# Patient Record
Sex: MALE | Race: WHITE | NOT HISPANIC OR LATINO | Employment: FULL TIME | URBAN - NONMETROPOLITAN AREA
[De-identification: names, ages, dates, MRNs, and addresses within clinical notes are randomized per-mention and may not be internally consistent; named-entity substitution may affect disease eponyms.]

---

## 2017-01-10 ENCOUNTER — OFFICE VISIT (OUTPATIENT)
Dept: FAMILY MEDICINE | Facility: OTHER | Age: 47
End: 2017-01-10
Attending: FAMILY MEDICINE
Payer: COMMERCIAL

## 2017-01-10 VITALS
OXYGEN SATURATION: 98 % | BODY MASS INDEX: 40.43 KG/M2 | SYSTOLIC BLOOD PRESSURE: 138 MMHG | RESPIRATION RATE: 16 BRPM | WEIGHT: 315 LBS | HEART RATE: 80 BPM | HEIGHT: 74 IN | TEMPERATURE: 98 F | DIASTOLIC BLOOD PRESSURE: 86 MMHG

## 2017-01-10 DIAGNOSIS — E66.01 MORBID OBESITY DUE TO EXCESS CALORIES (H): Primary | ICD-10-CM

## 2017-01-10 LAB
ALBUMIN SERPL-MCNC: 3.8 G/DL (ref 3.4–5)
ALP SERPL-CCNC: 61 U/L (ref 40–150)
ALT SERPL W P-5'-P-CCNC: 83 U/L (ref 0–70)
ANION GAP SERPL CALCULATED.3IONS-SCNC: 9 MMOL/L (ref 3–14)
AST SERPL W P-5'-P-CCNC: 45 U/L (ref 0–45)
BASOPHILS # BLD AUTO: 0 10E9/L (ref 0–0.2)
BASOPHILS NFR BLD AUTO: 0.8 %
BILIRUB SERPL-MCNC: 0.5 MG/DL (ref 0.2–1.3)
BUN SERPL-MCNC: 14 MG/DL (ref 7–30)
CALCIUM SERPL-MCNC: 9.1 MG/DL (ref 8.5–10.1)
CHLORIDE SERPL-SCNC: 106 MMOL/L (ref 94–109)
CO2 SERPL-SCNC: 26 MMOL/L (ref 20–32)
CREAT SERPL-MCNC: 1.05 MG/DL (ref 0.66–1.25)
DIFFERENTIAL METHOD BLD: NORMAL
EOSINOPHIL # BLD AUTO: 0.4 10E9/L (ref 0–0.7)
EOSINOPHIL NFR BLD AUTO: 7.2 %
ERYTHROCYTE [DISTWIDTH] IN BLOOD BY AUTOMATED COUNT: 13.4 % (ref 10–15)
GFR SERPL CREATININE-BSD FRML MDRD: 76 ML/MIN/1.7M2
GLUCOSE SERPL-MCNC: 110 MG/DL (ref 70–99)
HCT VFR BLD AUTO: 42.6 % (ref 40–53)
HGB BLD-MCNC: 14.8 G/DL (ref 13.3–17.7)
LYMPHOCYTES # BLD AUTO: 1.2 10E9/L (ref 0.8–5.3)
LYMPHOCYTES NFR BLD AUTO: 22.4 %
MCH RBC QN AUTO: 29.2 PG (ref 26.5–33)
MCHC RBC AUTO-ENTMCNC: 34.7 G/DL (ref 31.5–36.5)
MCV RBC AUTO: 84 FL (ref 78–100)
MONOCYTES # BLD AUTO: 0.6 10E9/L (ref 0–1.3)
MONOCYTES NFR BLD AUTO: 11 %
NEUTROPHILS # BLD AUTO: 3.1 10E9/L (ref 1.6–8.3)
NEUTROPHILS NFR BLD AUTO: 58.6 %
PLATELET # BLD AUTO: 324 10E9/L (ref 150–450)
POTASSIUM SERPL-SCNC: 3.6 MMOL/L (ref 3.4–5.3)
PROT SERPL-MCNC: 7.5 G/DL (ref 6.8–8.8)
RBC # BLD AUTO: 5.07 10E12/L (ref 4.4–5.9)
SODIUM SERPL-SCNC: 141 MMOL/L (ref 133–144)
WBC # BLD AUTO: 5.3 10E9/L (ref 4–11)

## 2017-01-10 PROCEDURE — 99214 OFFICE O/P EST MOD 30 MIN: CPT | Performed by: FAMILY MEDICINE

## 2017-01-10 PROCEDURE — 82306 VITAMIN D 25 HYDROXY: CPT | Mod: 90 | Performed by: FAMILY MEDICINE

## 2017-01-10 PROCEDURE — 80053 COMPREHEN METABOLIC PANEL: CPT | Performed by: FAMILY MEDICINE

## 2017-01-10 PROCEDURE — 83970 ASSAY OF PARATHORMONE: CPT | Mod: 90 | Performed by: FAMILY MEDICINE

## 2017-01-10 PROCEDURE — 99000 SPECIMEN HANDLING OFFICE-LAB: CPT | Performed by: FAMILY MEDICINE

## 2017-01-10 PROCEDURE — 36415 COLL VENOUS BLD VENIPUNCTURE: CPT | Performed by: FAMILY MEDICINE

## 2017-01-10 PROCEDURE — 85025 COMPLETE CBC W/AUTO DIFF WBC: CPT | Performed by: FAMILY MEDICINE

## 2017-01-10 ASSESSMENT — PAIN SCALES - GENERAL: PAINLEVEL: NO PAIN (0)

## 2017-01-10 NOTE — PROGRESS NOTES
Jason Duncan    January 10, 2017    Chief Complaint   Patient presents with     Weight Problem     needs a letter of support for weight loss surgery       SUBJECTIVE:  Lengthy review today.  Going through the process of weight loss surgery.  Needs some labs and letter of support.  He had great luck with lap band, but it has since failed and he is planning another procedure.  workign through the details and preparatory work.  We reviewed.      Past Medical History   Diagnosis Date     Unspecified otitis media 12/29/2000     Unspecified essential hypertension 6/9/2008       Past Surgical History   Procedure Laterality Date     Lap band  2008     Nasal septoplasty       Septoplasty       Nasal     Gastric restrictive procedure, open, remove/replace subcutaneous port  2008     Gi surgery  2015     removal of lap band        Current Outpatient Prescriptions   Medication Sig Dispense Refill     lisinopril (PRINIVIL,ZESTRIL) 10 MG tablet TAKE 1 TABLET BY MOUTH DAILY 90 tablet 3     cyclobenzaprine (FLEXERIL) 10 MG tablet TAKE 1 TABLET BY MOUTH NIGHTLY AS NEEDED FOR MUSCLE SPASMS 90 tablet 0     CLEAR-ATADINE 10 MG tablet TAKE 1 TABLET BY MOUTH DAILY 30 tablet 5     cetirizine (ZYRTEC) 10 MG tablet TAKE 1 TABLET BY MOUTH EVERY EVENING 30 tablet 3     amLODIPine (NORVASC) 10 MG tablet Take 1 tablet (10 mg) by mouth daily 90 tablet 3     multivitamin, therapeutic with minerals (MULTI-VITAMIN) TABS Take 1 tablet by mouth daily       aspirin 81 MG tablet Take by mouth daily         No Known Allergies    Family History   Problem Relation Age of Onset     Dementia Mother 72     Cause of death     DIABETES Father      Respiratory Father      COPD     Cardiovascular Father      CHF       Social History     Social History     Marital Status:      Spouse Name: N/A     Number of Children: N/A     Years of Education: N/A     Occupational History     Not on file.     Social History Main Topics     Smoking status: Never Smoker       Smokeless tobacco: Never Used     Alcohol Use: Yes      Comment: rarely     Drug Use: No     Sexual Activity: Not on file     Other Topics Concern     Parent/Sibling W/ Cabg, Mi Or Angioplasty Before 65f 55m? No     Social History Narrative       5 point ROS negative except as noted above in HPI, including Gen., Resp., CV, GI &  system review.     OBJECTIVE:  B/P: 138/86, T: 98, P: 80, R: 16    GENERAL APPEARANCE: Alert, no acute distress  CV: regular rate and rhythm, no murmur, rub or gallop  RESP: lungs clear to auscultation bilaterally  ABDOMEN: normal bowel sounds, soft, nontender, no hepatosplenomegaly or other masses  SKIN: no suspicious lesions or rashes to visualized skin  NEURO: Alert, oriented x 3, speech and mentation normal    ASSESSMENT and PLAN:  (E66.01) Morbid obesity due to excess calories (H)  (primary encounter diagnosis)  Comment: ongoing  Plan: Parathyroid Hormone Intact, Vitamin D         Deficiency, CBC with platelets and         differential, Comprehensive metabolic panel         (BMP + Alb, Alk Phos, ALT, AST, Total. Bili,         TP), NUTRITION REFERRAL        Getting labs and nutrition referral as recommended per weight loss surgery team.  Letter dictated.  25 minutes spent between coordinating with patient and surgery team and chart review.  I did all the steps that they asked me to.

## 2017-01-11 ENCOUNTER — CARE COORDINATION (OUTPATIENT)
Dept: SURGERY | Facility: CLINIC | Age: 47
End: 2017-01-11

## 2017-01-11 NOTE — PROGRESS NOTES
Tasklist reviewed.    PREOPERATIVE WEIGHT LOSS SURGERY TASKLIST  Call Kirstin at [phone] for any questions regarding tasks on this list  Tentative surgery: Sleeve (previous band and band removal)  Approximate Month and Year: To be determined     Surgeon: Cher Galvez Insurance Coverage: Preferred One  Exclusions: None  __x__Seminar viewed.    __x__Research Consent Form signed.     __x__Registered on [employer].     __x__Received Decision Making Handout to read.    __x__Received information about Support Group, 1st Wednesday of the month at  the [facility], 6:30p - 8:00pm.  __x__Letter of support from primary care provider.  1/10/17 from Dr Israel Meredith.    __x__Labs to be drawn by primary care provider:  1/10/17 done, in Epic.           Complete Blood Count (CBC), Comprehensive metabolic panel (CMP), Parathormone (PTH) and Vitamin D level.    ____Lose 20 lbs prior to surgery from the initial consult weight of 341 lbs    ____Exercise goal: 20 mins., 5 days a week; increase as tolerated.    _x__Dietician visit at initial consult - 10/30/15, 12/9/15, 2/3/16 Anabelle He RD           _x___Dietician visit month 2 - 3/2/16 Anabelle He RD  _x___Dietician visit month 3 - 4/8/16 Anabelle He RD  _x___Dietician visit month 4  - 5/25/16  Anabelle He RD                      _x___Dietician visit month 5 - 9/7/16 with Kirstie  ____Dietician visit month 6 - TBD - 11/10/16 Instructed to continue to see Anabelle He RD until at goal weight.  _x___Psychologist evaluation - 12/10/15 with Dr Rony Rodriguez. 11/10/16 Instructed to get an updates.              The week before you meet with the surgeon, watch the following online classes and call ANGELO Fulton at [phone] to inform her that you watched  them and to get any questions answered:  _____Before Your Weight Loss Surgery Online Class at [email or web address]  _____After Your Weight Loss Surgery Online Class at  [email or web address]  _____See your surgeon  after all the above tasks are done and you are at your goal weight or within 5 lbs of your goal weight.  Call Kirstin at  [phone] to schedule the appointment and to inform her of when you have watched the online classes.  _____After your visit with the surgeon, call Meño at [phone] to finalize the surgery date and schedule your final appointments to be 3 weeks before  your surgery date.     FINAL APPOINTMENTS  _____Weigh-in/Nurse Visit at Clinic 1E, 3 weeks before surgery.                Plan to be at your goal weight for this visit.  This can be done the same day you meet the surgeon if you are at your goal weight.  _____Pre-assessment Clinic, to be done 3 weeks before surgery. Meet with the anesthesia team. Your pre-operative history and physical can be done at this visit.    DISCLAIMER: Based on the evaluation results, the bariatric team decides whether and which bariatric surgery is the best option for you. Sometimes, even if you meet all of the pre-operative criteria, the bariatric team may still determine that in their medical judgement surgery is not recommended because of the risks to you.    CONTACT INFORMATION  ______If questions prior to surgery, call ANGELO Fulton at [phone].    ______If questions about insurance or scheduling surgery, call Meño at [phone].  ______If questions after surgery and to schedule 1E clinic appts, please call our  Call Center at [phone].  ______Fax: [phone] (preoperative documents, FMLA or return to work  forms).

## 2017-01-11 NOTE — PROGRESS NOTES
January 10, 2017            Radha Quigley RN   FAX:  [phone]      RE: Leatha Ospina   Date of Birth:  [birth year]   Medical Record #:  [id number]      Dear Radha:      I am happy to write a letter of support in regards to my patient, Leatha Ospina.  He had great luck in the past with treating his morbid obesity with a lap band, which has since failed and as I am sure you know, he is planning another weight loss procedure.  He has gained a substantial amount of weight back and seems to need the assistance of a surgical procedure in order to help him.  He certainly is motivated and has no barriers that I can identify to another procedure to aid in his weight loss.       I have ordered all the appropriate labs that were suggested in the staff message sent to me by the [facility] at the [facility].  I am writing this letter in full support of Leatha pursuing this course of action.      Please contact me with any questions.         Sincerely,      JOCELYN MONK MD             D: 01/10/2017 12:50   T: 01/10/2017 13:47   MT: noah      Name:     LEATHA OSPINA   MRN:      [id number]        Account:      [id number]   :      1970      Document: [id number]

## 2017-01-12 ENCOUNTER — HOSPITAL ENCOUNTER (OUTPATIENT)
Dept: NUTRITION | Facility: HOSPITAL | Age: 47
Discharge: HOME | End: 2017-01-12
Attending: FAMILY MEDICINE | Admitting: FAMILY MEDICINE
Payer: COMMERCIAL

## 2017-01-12 DIAGNOSIS — E66.01 MORBID OBESITY DUE TO EXCESS CALORIES (H): Primary | ICD-10-CM

## 2017-01-12 LAB
DEPRECATED CALCIDIOL+CALCIFEROL SERPL-MC: 20 UG/L (ref 20–75)
PTH-INTACT SERPL-MCNC: 31 PG/ML (ref 12–72)

## 2017-01-12 PROCEDURE — 97802 MEDICAL NUTRITION INDIV IN: CPT | Performed by: DIETITIAN, REGISTERED

## 2017-01-12 NOTE — PROGRESS NOTES
"[facility]  Medical Nutrition Therapy    Visit Type: Initial Assessment    Jason PLATT Arceniocayden referred by Dr. Meredith for MNT related to Bariatric and Obesity    Patient accompanied by self.    Nutrition Assessment:  Anthropometrics  Height: 6'3\" BMI:  43.1     Weight: 345 lbs    Goal weight : 321 lbs   IBW: 176-216 lbs    Nutrition History   Going through the process of weight loss surgery. Did get the lap band in the past, but this has since failed. He is interested in laparoscopic sleeve gastrectomy.  Pt has completed all required RD visits at [place].      Required weight loss goal pre-op : -24 lbs from initial consult weight (goal weight 321 lbs or less before surgery)    Physical Activity   Active at work on some days, depending on the job.      Nutrition Prescription (using 98 kg)  Energy:   9636-6786 kcals/day  (25-30 kcals/kg)      Protein:   78-98 grams/day   (0.8-1 grams/kg)                                                                             Food Record  Hx of diet attempt of high protein and low carb.  Currently he states \"I got really discouraged and over the holidays almost gave up, but recently and re-inspired to start this process again\"    Breakfast is usually eggs, sausage, toast, english muffins  Lunch is sandwich/burger (fast food)  Snacks are usually pretzels, banana, jello  Typically doesn't eat supper  Is going to stop drinking pop   Some alcohol, 3x/month        Nutrition Diagnosis:   Morbid obesity due to long history of excess kcals as evidenced by BMI of 43.1    Nutrition Intervention:   Provided education on post-op guidelines, vitamins/mineral supplementation, portion/kcal control, mindful eating, exercise.    Will come into see RD here bi-weekly for weight checks and followup.      Nutrition Goals:   Will lose 1 lb/week  Will record food intake into phone ameena and paper form provided  Will work on cutting out 300 kcals/day, with goal of eating 2100 " kcals/day    Nutrition Follow Up / Monitoring:  Patient to follow-up with RD in 2 weeks (January 25th 9:15)  Patient has RD contact information to call/email if needed.    Estefania Martin RD LD

## 2017-01-16 ENCOUNTER — TELEPHONE (OUTPATIENT)
Dept: FAMILY MEDICINE | Facility: OTHER | Age: 47
End: 2017-01-16

## 2017-01-16 ENCOUNTER — CARE COORDINATION (OUTPATIENT)
Dept: SURGERY | Facility: CLINIC | Age: 47
End: 2017-01-16

## 2017-01-16 NOTE — PROGRESS NOTES
Tasklist reviewed.     PREOPERATIVE WEIGHT LOSS SURGERY TASKLIST  Call Kirstin at [phone] for any questions regarding tasks on this list  Tentative surgery: Sleeve (previous band and band removal)  Approximate Month and Year: To be determined     Surgeon: Cher Galvez Insurance Coverage: Preferred One  Exclusions: None  __x__Seminar viewed.    __x__Research Consent Form signed.     __x__Registered on [employer].     __x__Received Decision Making Handout to read.    __x__Received information about Support Group, 1st Wednesday of the month at  the [facility], 6:30p - 8:00pm.  __x__Letter of support from primary care provider.  1/10/17 from Dr Israel Meredith.    __x__Labs to be drawn by primary care provider:  1/10/17 done, in Epic.           Complete Blood Count (CBC), Comprehensive metabolic panel (CMP), Parathormone (PTH) and Vitamin D level.    ____Lose 20 lbs prior to surgery from the initial consult weight of 341 lbs    ____Exercise goal: 20 mins., 5 days a week; increase as tolerated.    _x__Dietician visit at initial consult - 10/30/15, 12/9/15, 2/3/16 Anabelle He RD           _x__Dietician visit month 2 - 3/2/16 Anabelle He RD  _x__Dietician visit month 3 - 4/8/16 Anabelle He RD  _x__Dietician visit month 4  - 5/25/16  Anabelle He RD                      _x__Dietician visit month 5 - 9/7/16 with Kirstie  _x__Dietician visit month 6 - 1/12/17 Estefania Martin RD  Instructed to continue to see RD until at goal weight.  _x__Psychologist evaluation - 12/10/15 with Dr Rony Rodriguez. 11/10/16 Instructed to get an updates.              The week before you meet with the surgeon, watch the following online classes and call ANGELO Fulton at [phone] to inform her that you watched  them and to get any questions answered:  ____Before Your Weight Loss Surgery Online Class at [email or web address]  ____After Your Weight Loss Surgery Online Class at  [email or web address]  ____See your surgeon after all  the above tasks are done and you are at your goal weight or within 5 lbs of your goal weight.  Call Kirstin at  [phone] to schedule the appointment and to inform her of when you have watched the online classes.  ____After your visit with the surgeon, call Meño at [phone] to finalize the surgery date and schedule your final appointments to be 3 weeks before  your surgery date.     FINAL APPOINTMENTS  ____Weigh-in/Nurse Visit at Clinic 1E, 3 weeks before surgery.                Plan to be at your goal weight for this visit.  This can be done the same day you meet the surgeon if you are at your goal weight.  ____Pre-assessment Clinic, to be done 3 weeks before surgery. Meet with the anesthesia team. Your pre-operative history and physical can be done at this visit.    DISCLAIMER: Based on the evaluation results, the bariatric team decides whether and which bariatric surgery is the best option for you. Sometimes, even if you meet all of the pre-operative criteria, the bariatric team may still determine that in their medical judgement surgery is not recommended because of the risks to you.    CONTACT INFORMATION  _____If questions prior to surgery, call ANGELO Fulton at [phone].    _____If questions about insurance or scheduling surgery, call Meño at [phone].  _____If questions after surgery and to schedule 1E clinic appts, please call our  Call Center at [phone].  _____Fax: [phone] (preoperative documents, FMLA or return to work  forms).

## 2017-01-16 NOTE — TELEPHONE ENCOUNTER
Pt looking at my chart and Dr wants him to start vitam D 5000 3 daily and he was wondering what that meant and is this prescription or over the counter medication

## 2017-01-25 ENCOUNTER — TELEPHONE (OUTPATIENT)
Dept: SURGERY | Facility: CLINIC | Age: 47
End: 2017-01-25

## 2017-01-25 ENCOUNTER — HOSPITAL ENCOUNTER (OUTPATIENT)
Dept: NUTRITION | Facility: HOSPITAL | Age: 47
Setting detail: THERAPIES SERIES
End: 2017-01-25
Attending: FAMILY MEDICINE
Payer: COMMERCIAL

## 2017-01-25 PROCEDURE — 97803 MED NUTRITION INDIV SUBSEQ: CPT | Performed by: DIETITIAN, REGISTERED

## 2017-01-25 NOTE — TELEPHONE ENCOUNTER
Tasklist reviewed with patient.  Instructed to call Dr Rony Rodrgiuez at [phone] to get psychological evaluation updated.  To call coordinator to set up visit with surgeon when psychological evaluation is done and within 5 lbs of goal weight.  Seeing dietician 2x/month in [place].     PREOPERATIVE WEIGHT LOSS SURGERY TASKLIST  Call Kirstin at [phone] for any questions regarding tasks on this list  Tentative surgery: Sleeve (previous band and band removal)  Approximate Month and Year: To be determined     Surgeon: Cher  Tentative Insurance Coverage: Preferred One  Exclusions: None  __x__Seminar viewed.    __x__Research Consent Form signed.     __x__Registered on [employer].     __x__Received Decision Making Handout to read.    __x__Received information about Support Group, 1st Wednesday of the month at  the [facility], 6:30p - 8:00pm.  __x__Letter of support from primary care provider.  1/10/17 from Dr Israel Meredith.    __x__Labs to be drawn by primary care provider:  1/10/17 done, in Epic.           Complete Blood Count (CBC), Comprehensive metabolic panel (CMP), Parathormone (PTH) and Vitamin D level.    ____Lose 20 lbs prior to surgery from the initial consult weight of 341 lbs    ____Exercise goal: 20 mins., 5 days a week; increase as tolerated.    _x__Dietician visit at initial consult - 10/30/15, 12/9/15, 2/3/16 Anabelle He RD           _x__Dietician visit month 2 - 3/2/16 Anabelle He RD  _x__Dietician visit month 3 - 4/8/16 Anabelle He RD  _x__Dietician visit month 4  - 5/25/16  Anabelle He RD                      _x__Dietician visit month 5 - 9/7/16 with Kirstie  _x__Dietician visit month 6 - 1/12/17 Estefania Martin RD  Instructed to continue to see RD until at goal weight.  ___Psychologist evaluation - 12/10/15 with Dr Rony Rodriguez. 11/10/16 Instructed to get an updates.              The week before you meet with the surgeon, watch the following online classes and call ANGELO Fulton at  [phone] to inform her that you watched  them and to get any questions answered:  ____Before Your Weight Loss Surgery Online Class at [email or web address]  ____After Your Weight Loss Surgery Online Class at  [email or web address]  ____See your surgeon after all the above tasks are done and you are at your goal weight or within 5 lbs of your goal weight.  Call Kirstin at  [phone] to schedule the appointment and to inform her of when you have watched the online classes.  ____After your visit with the surgeon, call Meño at [phone] to finalize the surgery date and schedule your final appointments to be 3 weeks before  your surgery date.     FINAL APPOINTMENTS  ____Weigh-in/Nurse Visit at Clinic 1E, 3 weeks before surgery.                Plan to be at your goal weight for this visit.  This can be done the same day you meet the surgeon if you are at your goal weight.  ____Pre-assessment Clinic, to be done 3 weeks before surgery. Meet with the anesthesia team. Your pre-operative history and physical can be done at this visit.    DISCLAIMER: Based on the evaluation results, the bariatric team decides whether and which bariatric surgery is the best option for you. Sometimes, even if you meet all of the pre-operative criteria, the bariatric team may still determine that in their medical judgement surgery is not recommended because of the risks to you.    CONTACT INFORMATION  _____If questions prior to surgery, call ANGELO Fulton at [phone].    _____If questions about insurance or scheduling surgery, call Meño at [phone].  _____If questions after surgery and to schedule 1E clinic appts, please call our  Call Center at [phone].  _____Fax: [phone] (preoperative documents, FMLA or return to work  forms).

## 2017-01-25 NOTE — PROGRESS NOTES
"Follow-up from 1/12/17    Height: 6'3\"  Weight: 335 lbs  BMI : 41.9  Goal weight: 321 lbs    Lost 10 lbs in two weeks.    Kept food log on paper.  States eating healthy has been easy so far, and has not struggled with the reduced oral intake.  Is making sure he gets protein, fruits, vegetables.  Sometimes treats himself to m&ms, stating \"they are my downfall\" but does portion these out and factors them into his calorie limit for the day.    Has been exercising daily, which he states is mostly walking.    Will continue to meet bi-weekly until he is at goal weight of 321 lbs.    Will continue to keep food log.      Next scheduled appt:  Feb 8th at 8:15am    Time spent with patient: 15 minutes    Estefania Martin RD  "

## 2017-01-31 ENCOUNTER — OFFICE VISIT (OUTPATIENT)
Dept: FAMILY MEDICINE | Facility: OTHER | Age: 47
End: 2017-01-31
Attending: PHYSICIAN ASSISTANT
Payer: COMMERCIAL

## 2017-01-31 VITALS
HEART RATE: 72 BPM | SYSTOLIC BLOOD PRESSURE: 116 MMHG | DIASTOLIC BLOOD PRESSURE: 70 MMHG | BODY MASS INDEX: 40.17 KG/M2 | WEIGHT: 313 LBS | TEMPERATURE: 97.9 F | HEIGHT: 74 IN

## 2017-01-31 DIAGNOSIS — L40.9 PSORIASIS: Primary | ICD-10-CM

## 2017-01-31 PROCEDURE — 99213 OFFICE O/P EST LOW 20 MIN: CPT | Performed by: PHYSICIAN ASSISTANT

## 2017-01-31 RX ORDER — TRIAMCINOLONE ACETONIDE 1 MG/G
CREAM TOPICAL
Qty: 80 G | Refills: 0 | Status: SHIPPED | OUTPATIENT
Start: 2017-01-31 | End: 2017-04-12

## 2017-01-31 RX ORDER — BETAMETHASONE DIPROPIONATE 0.5 MG/G
OINTMENT, AUGMENTED TOPICAL
Qty: 45 G | Refills: 0 | Status: SHIPPED | OUTPATIENT
Start: 2017-01-31 | End: 2017-04-12

## 2017-01-31 ASSESSMENT — PAIN SCALES - GENERAL: PAINLEVEL: NO PAIN (0)

## 2017-01-31 NOTE — NURSING NOTE
"Chief Complaint   Patient presents with     Derm Problem     Rash on upper back x 1 month, itchy       Initial /70 mmHg  Pulse 72  Temp(Src) 97.9  F (36.6  C) (Tympanic)  Ht 6' 2\" (1.88 m)  Wt 313 lb (141.976 kg)  BMI 40.17 kg/m2 Estimated body mass index is 40.17 kg/(m^2) as calculated from the following:    Height as of this encounter: 6' 2\" (1.88 m).    Weight as of this encounter: 313 lb (141.976 kg).  BP completed using cuff size: pasha Coffey    "

## 2017-01-31 NOTE — MR AVS SNAPSHOT
After Visit Summary   1/31/2017    Jason Duncan    MRN: [id number]           Patient Information     Date Of Birth          1970        Visit Information        Provider Department      1/31/2017 4:00 PM Nina Bella PA [facility]        Today's Diagnoses     Psoriasis    -  1       Care Instructions    Follow up if symptoms persist or worsen.          Follow-ups after your visit        Additional Services     DERMATOLOGY REFERRAL       Your provider has referred you to: Dr. Jama    Please be aware that coverage of these services is subject to the terms and limitations of your health insurance plan.  Call member services at your health plan with any benefit or coverage questions.      Please bring the following with you to your appointment:    (1) Any X-Rays, CTs or MRIs which have been performed.  Contact the facility where they were done to arrange for  prior to your scheduled appointment.    (2) List of current medications  (3) This referral request   (4) Any documents/labs given to you for this referral                  Your next 10 appointments already scheduled     Feb 08, 2017  8:15 AM   Return Visit with Estefania Martin RD   [facility] ([facility] )    [address] [place] [address] [phone]              Who to contact     If you have questions or need follow up information about today's clinic visit or your schedule please contact [facility] directly at [phone].  Normal or non-critical lab and imaging results will be communicated to you by MyChart, letter or phone within 4 business days after the clinic has received the results. If you do not hear from us within 7 days, please contact the clinic through MyChart or phone. If you have a critical or abnormal lab result, we will notify you by phone as soon as possible.  Submit refill requests through [employer] or call your pharmacy and they will forward the  "refill request to us. Please allow 3 business days for your refill to be completed.          Additional Information About Your Visit        [employer] Information     [email or web address] gives you secure access to your electronic health record. If you see a primary care provider, you can also send messages to your care team and make appointments. If you have questions, please call your primary care clinic.  If you do not have a primary care provider, please call [phone] and they will assist you.        Care EveryWhere ID     This is your Care EveryWhere ID. This could be used by other organizations to access your [place] medical records  [id number]        Your Vitals Were     Pulse Temperature Height BMI (Body Mass Index)          72 97.9  F (36.6  C) (Tympanic) 6' 2\" (1.88 m) 40.17 kg/m2         Blood Pressure from Last 3 Encounters:   01/31/17 116/70   01/10/17 138/86   12/21/16 138/88    Weight from Last 3 Encounters:   01/31/17 313 lb (141.976 kg)   01/10/17 335 lb (151.955 kg)   12/21/16 340 lb (154.223 kg)              We Performed the Following     DERMATOLOGY REFERRAL          Today's Medication Changes          These changes are accurate as of: 1/31/17  4:23 PM.  If you have any questions, ask your nurse or doctor.               Start taking these medicines.        Dose/Directions    augmented betamethasone dipropionate 0.05 % ointment   Commonly known as:  DIPROLENE-AF   Used for:  Psoriasis   Started by:  Nina Bella PA        Apply sparingly to affected area twice daily as needed.  Do not apply to face.   Quantity:  45 g   Refills:  0       triamcinolone 0.1 % cream   Commonly known as:  KENALOG   Used for:  Psoriasis   Started by:  Nina Bella PA        Apply sparingly to affected area three times daily as needed   Quantity:  80 g   Refills:  0            Where to get your medicines      These medications were sent to [facility] - [address]  [address], " [place] [address]     Phone:  [phone]    - augmented betamethasone dipropionate 0.05 % ointment  - triamcinolone 0.1 % cream             Primary Care Provider Office Phone # Fax #    Israel Meredith [phone]       [facility] [address] [place] [address]        Thank you!     Thank you for choosing [facility]  for your care. Our goal is always to provide you with excellent care. Hearing back from our patients is one way we can continue to improve our services. Please take a few minutes to complete the written survey that you may receive in the mail after your visit with us. Thank you!             Your Updated Medication List - Protect others around you: Learn how to safely use, store and throw away your medicines at www.disposemymeds.org.          This list is accurate as of: 1/31/17  4:23 PM.  Always use your most recent med list.                   Brand Name Dispense Instructions for use    amLODIPine 10 MG tablet    NORVASC    90 tablet    Take 1 tablet (10 mg) by mouth daily       aspirin 81 MG tablet      Take by mouth daily       augmented betamethasone dipropionate 0.05 % ointment    DIPROLENE-AF    45 g    Apply sparingly to affected area twice daily as needed.  Do not apply to face.       cetirizine 10 MG tablet    zyrTEC    30 tablet    TAKE 1 TABLET BY MOUTH EVERY EVENING       CLEAR-ATADINE 10 MG tablet   Generic drug:  loratadine     30 tablet    TAKE 1 TABLET BY MOUTH DAILY       cyclobenzaprine 10 MG tablet    FLEXERIL    90 tablet    TAKE 1 TABLET BY MOUTH NIGHTLY AS NEEDED FOR MUSCLE SPASMS       lisinopril 10 MG tablet    PRINIVIL/ZESTRIL    90 tablet    TAKE 1 TABLET BY MOUTH DAILY       Multi-vitamin Tabs tablet      Take 1 tablet by mouth daily       triamcinolone 0.1 % cream    KENALOG    80 g    Apply sparingly to affected area three times daily as needed

## 2017-01-31 NOTE — PROGRESS NOTES
Subjective:  Jason Duncan is a 46 year old male who presents with a several year history of itchy rash on forearms and now larger itchy rash on back for 2 months.        PMH, PSH, FH reviewed and unchanged    Current Outpatient Prescriptions   Medication     lisinopril (PRINIVIL,ZESTRIL) 10 MG tablet     cyclobenzaprine (FLEXERIL) 10 MG tablet     CLEAR-ATADINE 10 MG tablet     cetirizine (ZYRTEC) 10 MG tablet     amLODIPine (NORVASC) 10 MG tablet     multivitamin, therapeutic with minerals (MULTI-VITAMIN) TABS     aspirin 81 MG tablet     No current facility-administered medications for this visit.       No Known Allergies    Review of Systems:  Gen: negative for fever, chills, change in weight  Derm: See HPI       Objective:    B/P: 116/70, T: 97.9, P: 72, R: Data Unavailable    Physical Exam:  Constitutional: healthy, alert and no distress  Skin: Inflammatory papules with some scale forearms. Scaly raised plaque patches on back.  Psych: Mood is euthymic with corresponding affect      Assessment and Plan  (L40.9) Psoriasis  (primary encounter diagnosis)  Comment: Ungt sparingly for 1 week, then off 2-3 weeks. Triamcinolone for 2 weeks in between, then off. Refer to Dr. Jama  Plan: DERMATOLOGY REFERRAL, augmented betamethasone         dipropionate (DIPROLENE-AF) 0.05 % ointment,         triamcinolone (KENALOG) 0.1 % cream                Rx per EPIC.  Risk/benefit/side effects and intended purposes discussed.   Follow up with worsening sx or failure to improve or with any questions or concerns.     Nina Bella PA-C

## 2017-02-07 ENCOUNTER — OFFICE VISIT (OUTPATIENT)
Dept: FAMILY MEDICINE | Facility: OTHER | Age: 47
End: 2017-02-07
Attending: NURSE PRACTITIONER
Payer: OTHER MISCELLANEOUS

## 2017-02-07 VITALS
TEMPERATURE: 98.2 F | WEIGHT: 315 LBS | RESPIRATION RATE: 14 BRPM | SYSTOLIC BLOOD PRESSURE: 128 MMHG | DIASTOLIC BLOOD PRESSURE: 72 MMHG | HEART RATE: 88 BPM | HEIGHT: 74 IN | BODY MASS INDEX: 40.43 KG/M2

## 2017-02-07 DIAGNOSIS — M25.461 KNEE EFFUSION, RIGHT: ICD-10-CM

## 2017-02-07 DIAGNOSIS — S89.91XA KNEE INJURY, RIGHT, INITIAL ENCOUNTER: Primary | ICD-10-CM

## 2017-02-07 DIAGNOSIS — M25.561 ACUTE PAIN OF RIGHT KNEE: ICD-10-CM

## 2017-02-07 PROCEDURE — 99214 OFFICE O/P EST MOD 30 MIN: CPT | Performed by: NURSE PRACTITIONER

## 2017-02-07 PROCEDURE — 73562 X-RAY EXAM OF KNEE 3: CPT | Mod: TC | Performed by: RADIOLOGY

## 2017-02-07 ASSESSMENT — PAIN SCALES - GENERAL: PAINLEVEL: SEVERE PAIN (6)

## 2017-02-07 NOTE — PATIENT INSTRUCTIONS
1. Knee injury, right, initial encounter  - XR KNEE RIGHT 3 VIEWS (Clinic Performed); Future  - XR KNEE RIGHT 3 VIEWS (Clinic Performed)    2. Acute pain of right knee  - order for DME; Equipment being ordered: knee sleeve  Dispense: 1 Device; Refill: 0  - MR Knee Right w/o & w Contrast; Future  - MR Knee Right w/o & w Contrast  - You will hear from the hospital once MRI is improved  - Ice  - Rest  - Elevate as able    3. Knee effusion, right  - order for DME; Equipment being ordered: knee sleeve  Dispense: 1 Device; Refill: 0  - MR Knee Right w/o & w Contrast; Future  - MR Knee Right w/o & w Contrast    FU - after MRI, sooner as needed  If you do not hear back regarding the MRI within 1 week, please contact my nurse Lorrie at [phone]        Samira [facility]OR[facility] Facility[facility]  [phone]

## 2017-02-07 NOTE — Clinical Note
[facility] (formerly [facility])  [address]  [facility] [address]  [phone]    REPORT OF WORK ABILITY    NOTE TO EMPLOYEE: You must promptly provide a copy of this report to your  employer or worker's compensation insurer, and Qualified Rehabilitation Consultant.    Date: 2/7/2017                     Employee Name: Jason Duncan         YOB: 1970  Medical Record Number: [id number]   Soc.Sec.No: [id number]  Employer: [employer]                 Date of Injury: 2/6/17  Managed Care Organization / Insurance Company Name: UNKNOWN    Diagnosis: Right knee trauma due to fall, medial effusion, lateral pain, consistent with twisting injury.  Work Related: yes    Permanent Partial Disability(PPD) likely: NO    EMPLOYEE IS ABLE TO WORK: with restrictions from 2/7/17 to undetermined, as MRI requires approval     RESTRICTIONS IF ANY:   Stand: Continuously (6-8 hours)   Sit: Full time    Walk: Occasionally (2-4 hours)   Kneel/[place]: Not at all (0 hours)      Lift, carry no more than:  No restrictions Occasionally (2-4 hours)   May use hands repetitively for:    Simple grasping: not applicable      Pushing/pulling: not applicable    Fine manipulation:  not applicable    Firm grasping:  not applicable     Operate Foot controls:  Right foot: no   Left foot: no     Bend:  N/A     Stoop: N/A    Twist: N/A     Reach Above Shoulders: Frequently (4-6 hours)    OTHER RESTRICTIONS: Cannot work on unlevel ground surface    TREATMENT PLAN/NOTES: change work position for comfort, MRI - as ordered, Ibuprofen and knee brace.      Samira ALCALA  [phone]

## 2017-02-07 NOTE — PROGRESS NOTES
"SUBJECTIVE:  Jason Duncan is a 46 year old male   Chief Complaint   Patient presents with     Fall     fell off snowbank yesterday injuring right knee      *_* Health Care Directive *_*       Active diagnoses this visit:  Knee injury, right, initial encounter -     Yesterday while at approximately 11:45am, while at work for [employer] where he works as a , patient fell, injuring his knee.  He was on a snowback, slid on ice, he fell and twisted his right knee.   He immediately experienced pain.    He reported the incident to his employer.  He went to work today despite discomfort, and is here for evaluation.    At this time his pain is lateral.    Increases with lateral movement.    No instability present  Knee is swollen, very tender - anterior and lateral        REVIEW OF SYSTEMS  Skin: negative  Eyes: negative  Ears/Nose/Throat: negative  Respiratory: No shortness of breath, dyspnea on exertion, cough, or hemoptysis  Cardiovascular: negative  Gastrointestinal: negative  Genitourinary: negative  Musculoskeletal: as above  Neurologic: negative  Psychiatric: negative  Hematologic/Lymphatic/Immunologic: negative      OBJECTIVE:  /72 mmHg  Pulse 88  Temp(Src) 98.2  F (36.8  C) (Tympanic)  Resp 14  Ht 6' 2\" (1.88 m)  Wt 342 lb 3.2 oz (155.221 kg)  BMI 43.92 kg/m2  Constitutional: healthy, alert, no distress and cooperative  ENT: ENT exam unremarkable  Cardiovascular: PMI normal. No murmurs, clicks gallops or rub  Respiratory: negative, Percussion normal. Good diaphragmatic excursion. Lungs clear  Gastrointestinal: Abdomen soft, non-tender. BS normal. No masses, organomegaly  Musculoskeletal: Right knee - large anterior to medial effusion, with lateral discomfort with internal or external rotation of foot.   Skin: no suspicious lesions or rashes  Neurologic: Gait normal. Sensation grossly WNL.  Psychiatric: mentation appears normal and affect " normal/bright  Hematologic/Lymphatic/Immunologic: No adenopathy noted    Visit time:  25 Minutes  Time spent with patient, including examination, face to face patient education - 20mn  Visit Content: During our face to face time, patient education was provided regarding diagnosis, and treatment pan. Patient counseled regarding disease process  All diagnosis and treatment plan as above are reviewed with the patient.  Record review completed      Xray completed noting no fracture, but joint effusion is noted consistent with his injury and pain complaints.      1. Knee injury, right, initial encounter  - XR KNEE RIGHT 3 VIEWS (Clinic Performed); Future  - XR KNEE RIGHT 3 VIEWS (Clinic Performed)    2. Acute pain of right knee  - order for DME; Equipment being ordered: knee sleeve  Dispense: 1 Device; Refill: 0  - MR Knee Right w/o & w Contrast; Future  - MR Knee Right w/o & w Contrast  - You will hear from the hospital once MRI is improved  - Ice  - Rest  - Elevate as able    3. Knee effusion, right  - order for DME; Equipment being ordered: knee sleeve  Dispense: 1 Device; Refill: 0  - MR Knee Right w/o & w Contrast; Future  - MR Knee Right w/o & w Contrast    FU - after MRI, sooner as needed  If you do not hear back regarding the MRI within 1 week, please contact my nurse Lorrie at [phone]    Samira ALCALA  [phone]

## 2017-02-07 NOTE — NURSING NOTE
"Chief Complaint   Patient presents with     Fall     fell off snowbank yesterday injuring right knee      *_* Health Care Directive *_*       Initial /72 mmHg  Pulse 88  Temp(Src) 98.2  F (36.8  C) (Tympanic)  Resp 14  Ht 6' 2\" (1.88 m)  Wt 342 lb 3.2 oz (155.221 kg)  BMI 43.92 kg/m2 Estimated body mass index is 43.92 kg/(m^2) as calculated from the following:    Height as of this encounter: 6' 2\" (1.88 m).    Weight as of this encounter: 342 lb 3.2 oz (155.221 kg).  Medication Reconciliation: complete       ASHLEY ALTMAN  .    "

## 2017-02-07 NOTE — MR AVS SNAPSHOT
After Visit Summary   2/7/2017    Jason Duncan    MRN: [id number]           Patient Information     Date Of Birth          1970        Visit Information        Provider Department      2/7/2017 1:45 PM Samira Whitney NP [facility]        Today's Diagnoses     Knee injury, right, initial encounter    -  1     Acute pain of right knee         Knee effusion, right           Care Instructions          1. Knee injury, right, initial encounter  - XR KNEE RIGHT 3 VIEWS (Clinic Performed); Future  - XR KNEE RIGHT 3 VIEWS (Clinic Performed)    2. Acute pain of right knee  - order for DME; Equipment being ordered: knee sleeve  Dispense: 1 Device; Refill: 0  - MR Knee Right w/o & w Contrast; Future  - MR Knee Right w/o & w Contrast  - You will hear from the hospital once MRI is improved  - Ice  - Rest  - Elevate as able    3. Knee effusion, right  - order for DME; Equipment being ordered: knee sleeve  Dispense: 1 Device; Refill: 0  - MR Knee Right w/o & w Contrast; Future  - MR Knee Right w/o & w Contrast    FU - after MRI, sooner as needed  If you do not hear back regarding the MRI within 1 week, please contact my nurse Lorrie at [phone]        Samira Whitney [facility]  [phone]                Follow-ups after your visit        Your next 10 appointments already scheduled     Feb 08, 2017  8:15 AM   Return Visit with Estefania Martin RD   [facility] ([facility] )    [address] [place] [id number] [phone]              Who to contact     If you have questions or need follow up information about today's clinic visit or your schedule please contact [facility] directly at [phone].  Normal or non-critical lab and imaging results will be communicated to you by MyChart, letter or phone within 4 business days after the clinic has received the results. If you do not hear from us within 7 days, please contact the clinic through MyChart or phone. If you have a  "critical or abnormal lab result, we will notify you by phone as soon as possible.  Submit refill requests through [employer] or call your pharmacy and they will forward the refill request to us. Please allow 3 business days for your refill to be completed.          Additional Information About Your Visit        [employer] Information     [employer] gives you secure access to your electronic health record. If you see a primary care provider, you can also send messages to your care team and make appointments. If you have questions, please call your primary care clinic.  If you do not have a primary care provider, please call [phone] and they will assist you.        Care EveryWhere ID     This is your Care EveryWhere ID. This could be used by other organizations to access your [place] medical records  [id number]        Your Vitals Were     Pulse Temperature Respirations Height BMI (Body Mass Index)       88 98.2  F (36.8  C) (Tympanic) 14 6' 2\" (1.88 m) 43.92 kg/m2        Blood Pressure from Last 3 Encounters:   02/07/17 128/72   01/31/17 116/70   01/10/17 138/86    Weight from Last 3 Encounters:   02/07/17 342 lb 3.2 oz (155.221 kg)   01/31/17 313 lb (141.976 kg)   01/10/17 335 lb (151.955 kg)              We Performed the Following     MR Knee Right w/o & w Contrast     XR KNEE RIGHT 3 VIEWS (Clinic Performed)          Today's Medication Changes          These changes are accurate as of: 2/7/17  3:01 PM.  If you have any questions, ask your nurse or doctor.               Start taking these medicines.        Dose/Directions    order for DME   Used for:  Acute pain of right knee, Knee effusion, right   Started by:  Samira Whitney NP        Equipment being ordered: knee sleeve   Quantity:  1 Device   Refills:  0            Where to get your medicines      Some of these will need a paper prescription and others can be bought over the counter.  Ask your nurse if you have questions.     Bring a paper prescription for each of " these medications    - order for DME             Primary Care Provider Office Phone # Fax #    Israel Meredith [phone]       [facility] [address] [facility] [facility] [address]  [facility] [address]        Thank you!     Thank you for choosing [facility]  for your care. Our goal is always to provide you with excellent care. Hearing back from our patients is one way we can continue to improve our services. Please take a few minutes to complete the written survey that you may receive in the mail after your visit with us. Thank you!             Your Updated Medication List - Protect others around you: Learn how to safely use, store and throw away your medicines at www.disposemymeds.org.          This list is accurate as of: 2/7/17  3:01 PM.  Always use your most recent med list.                   Brand Name Dispense Instructions for use    amLODIPine 10 MG tablet    NORVASC    90 tablet    Take 1 tablet (10 mg) by mouth daily       aspirin 81 MG tablet      Take by mouth daily       augmented betamethasone dipropionate 0.05 % ointment    DIPROLENE-AF    45 g    Apply sparingly to affected area twice daily as needed.  Do not apply to face.       cetirizine 10 MG tablet    zyrTEC    30 tablet    TAKE 1 TABLET BY MOUTH EVERY EVENING       CLEAR-ATADINE 10 MG tablet   Generic drug:  loratadine     30 tablet    TAKE 1 TABLET BY MOUTH DAILY       cyclobenzaprine 10 MG tablet    FLEXERIL    90 tablet    TAKE 1 TABLET BY MOUTH NIGHTLY AS NEEDED FOR MUSCLE SPASMS       lisinopril 10 MG tablet    PRINIVIL/ZESTRIL    90 tablet    TAKE 1 TABLET BY MOUTH DAILY       Multi-vitamin Tabs tablet      Take 1 tablet by mouth daily       order for DME     1 Device    Equipment being ordered: knee sleeve       triamcinolone 0.1 % cream    KENALOG    80 g    Apply sparingly to affected area three times daily as needed       VITAMIN D (CHOLECALCIFEROL) PO      Take 4,000 Units by mouth daily

## 2017-02-08 ENCOUNTER — HOSPITAL ENCOUNTER (OUTPATIENT)
Dept: NUTRITION | Facility: HOSPITAL | Age: 47
Setting detail: THERAPIES SERIES
End: 2017-02-08
Attending: FAMILY MEDICINE
Payer: COMMERCIAL

## 2017-02-08 ENCOUNTER — TELEPHONE (OUTPATIENT)
Dept: FAMILY MEDICINE | Facility: OTHER | Age: 47
End: 2017-02-08

## 2017-02-08 DIAGNOSIS — R93.6 ABNORMAL MRI, KNEE: ICD-10-CM

## 2017-02-08 DIAGNOSIS — M25.461 KNEE EFFUSION, RIGHT: Primary | ICD-10-CM

## 2017-02-08 PROCEDURE — 97803 MED NUTRITION INDIV SUBSEQ: CPT | Performed by: DIETITIAN, REGISTERED

## 2017-02-08 NOTE — TELEPHONE ENCOUNTER
We are requesting advanced care approval for MRI RT KNEE. Please see office note and work ability dated 02/07/17 by LANI Whitney NP.    Thank you,    Concepción SARGENT-P  Work Comp [address] [address]  Office: [phone] Fax [phone]

## 2017-02-08 NOTE — PROGRESS NOTES
"Followup from 1/25/17    Height: 6'3\"  Weight: 335 lbs  Goal weight: 321-326 lbs    Has not lost weight in the past 2 weeks.   Also just injured knee, so unable to exercise, and will be out of work until MRI, which is potentially going to be in 2 weeks.    Continues to keep food log.  States \"I have been feeling really hungry, but I am sticking to the plan\"  Eats from 1,000-1,600 kcals/day    Will continue to meet bi-weekly until he is at goal weight.    Will continue to keep food log.    Next scheduled appt:  Feb 22nd at 8:15am    Time spent with patient: 15 minutes    Estefania Martin RD  "

## 2017-02-08 NOTE — TELEPHONE ENCOUNTER
Patient was wondering if he is able to drive. His work ability said no on foot controls. Explained to him it meant there were no restrictions on foot controls and yes he could drive.

## 2017-02-09 NOTE — TELEPHONE ENCOUNTER
Prior Authorization Approval Info:    Approved For: MRI RT KNEE    Number of Visits:   (if applicable)    NAME OF AUTHORIZING PERSON : Delfino Villarreal    NAME OF INSURANCE COMPANY: [employer]    DATE: 02/09/17 VERBAL APPROVAL

## 2017-02-09 NOTE — TELEPHONE ENCOUNTER
email dated 02/09/17:        Please allow this email to serve as written authorization for a right knee MRI for Jason Duncan.    Regards,    Delfino Villarreal  [phone]  [employer]    -----Original Message-----  Date:   February 9, 2017 12:28 PM CST  From:   [email or web address]  To:     [email or web address]  Subject:SECURE    Hi  Delfino,    Re: Jason Duncan  Claim # [id number]    Please send written approval for MRI RT KNEE per discussion, I did send it over to be scheduled with a VERBAL APPROVAL  02/09/17.    Have a great day!    Thank you,    Concepción SARGENT-P  Work Comp [facility]  [facility]  [email or web address]  [address] [place] [address]  Office: [phone] Fax [phone]

## 2017-02-13 ENCOUNTER — HOSPITAL ENCOUNTER (OUTPATIENT)
Dept: MRI IMAGING | Facility: HOSPITAL | Age: 47
Discharge: HOME | End: 2017-02-13
Attending: NURSE PRACTITIONER | Admitting: NURSE PRACTITIONER
Payer: OTHER MISCELLANEOUS

## 2017-02-13 PROCEDURE — 73721 MRI JNT OF LWR EXTRE W/O DYE: CPT | Mod: TC,RT

## 2017-02-21 ENCOUNTER — OFFICE VISIT (OUTPATIENT)
Dept: ORTHOPEDICS | Facility: OTHER | Age: 47
End: 2017-02-21
Attending: NURSE PRACTITIONER
Payer: OTHER MISCELLANEOUS

## 2017-02-21 VITALS
BODY MASS INDEX: 39.17 KG/M2 | SYSTOLIC BLOOD PRESSURE: 120 MMHG | HEART RATE: 79 BPM | HEIGHT: 75 IN | TEMPERATURE: 97.6 F | WEIGHT: 315 LBS | DIASTOLIC BLOOD PRESSURE: 70 MMHG | OXYGEN SATURATION: 97 %

## 2017-02-21 DIAGNOSIS — M25.461 KNEE EFFUSION, RIGHT: ICD-10-CM

## 2017-02-21 DIAGNOSIS — I10 ESSENTIAL HYPERTENSION: ICD-10-CM

## 2017-02-21 DIAGNOSIS — M23.91 ARTICULAR CARTILAGE DISORDER OF KNEE, RIGHT: Primary | ICD-10-CM

## 2017-02-21 PROCEDURE — 99203 OFFICE O/P NEW LOW 30 MIN: CPT | Mod: 25 | Performed by: ORTHOPAEDIC SURGERY

## 2017-02-21 PROCEDURE — 20610 DRAIN/INJ JOINT/BURSA W/O US: CPT | Mod: RT | Performed by: ORTHOPAEDIC SURGERY

## 2017-02-21 RX ORDER — AMLODIPINE BESYLATE 10 MG/1
TABLET ORAL
Qty: 90 TABLET | Refills: 3 | Status: SHIPPED | OUTPATIENT
Start: 2017-02-21 | End: 2017-12-27

## 2017-02-21 ASSESSMENT — PAIN SCALES - GENERAL: PAINLEVEL: NO PAIN (0)

## 2017-02-21 NOTE — PROGRESS NOTES
Occupational Visit     New Patient Visit  Referral Source: Samira Whitney NP   Chief Complaint: Work-related injury right knee February 6, 2017     History of Present Illness/Injury: This 47-year-old right-handed  works for [employer].  He had no prior problems with his right knee until February 6, 2017 when he was in the course of his appointment and slipped off a pile landing on the lateral side of his right leg.  He felt a sudden onset of right knee pain.  The injury was quickly reported to his employer.  The following day he sought Rusty ANSARI who obtained x-rays and an MRI.  He was treated with a neoprene knee sleeve.  He has been out of work since 2/7/17    Today he complains of intermittent dull anterior right knee pain.  The pain ranges in severity from 0-6 out of 10, provoked by flexing the knee or pivoting on that leg.  He also complains of swelling and decreased range of motion in that joint.  He has tried taking Advil and elevating the leg with minimal improvement.  He admits that his swelling is now less than it was originally.    A review of the patient's complete medical/family/social  history, medications, allergies and a two (neurological and musculoskeletal) system review of systems are noncontributory for the presenting problem     Examination: Overweight but otherwise healthy-appearing male with appropriate mood and affect.  His gait is antalgic on the right.  He is not using an ambulatory appliance.  The skin around the right leg is intact without erythema or ecchymosis.  The knee has no deformity.  It has a moderate effusion.  It is nontender to palpation.  His range of motion is   with pain at the extreme of flexion.  Pain is not provoked by passive patellar excursion medially or laterally.  The knee is stable to varus and valgus and anterior and posterior stresses.  The neurovascular status of that limb is intact.    X-ray; plain films the right knee taken on 2/7/17 show  mild joint space narrowing medially indicating early DJD.  The MRI of his right knee performed on 2/13/17 shows a moderate effusion, a medial synovial plica, and 2 full-thickness articular cartilage defects.  One is 8.8 mm in diameter in the depth of the trochlear groove of the femur.  The other was 15 mm in diameter on the medial femoral condyle.  The MFC defect is associated with an adjacent piece of articular cartilage which may be still partially attached,     Impression:  #1.  Pre-existing mild medial compartment primary osteoarthritis right knee  #2.  Work-related articular cartilage injuries of the femoral trochlear groove and medial femoral condyle right knee, both causually related to a work injury of 2/6/17    Plan: I recommended aspiration of the effusion and a trial of physical therapy.  If his knee feels to improve then he may require arthroscopic intervention which may include microfracture.  He'll need to remain out of work for now.  He agreed to this plan after I answered all his questions.    Procedure: After obtaining written consent and conducting a timeout the right knee  was sterilely prepped.  Topical ethyl chloride spray and subcutaneous tissues 1% Xylocaine were used as local anesthetics.  The right knee was aspirated of 53ml of normal-appearing joint fluid.  The needle was removed and a Band-Aid applied.  The patient tolerated the procedure well and there were no complications.

## 2017-02-21 NOTE — PATIENT INSTRUCTIONS
Someone will contact you within the next 1-2 business days to schedule your first appointment with physical therapy.  If you have not heard from someone within 2 days, please contact our office.

## 2017-02-21 NOTE — MR AVS SNAPSHOT
After Visit Summary   2/21/2017    Jason Duncan    MRN: [id number]           Patient Information     Date Of Birth          1970        Visit Information        Provider Department      2/21/2017 1:00 PM Juan Ramon Dickey MD  ORTHOPEDICS        Today's Diagnoses     Articular cartilage disorder of knee, right    -  1    Knee effusion, right           Follow-ups after your visit        Additional Services     PHYSICAL THERAPY REFERRAL       *This therapy referral will be filtered to a centralized scheduling office at [facility] and the patient will receive a call to schedule an appointment at a [facility] location most convenient for them. *     [facility] provides Physical Therapy evaluation and treatment and many specialty services across the [facility] system.  If requesting a specialty program, please choose from the list below.    If you have not heard from the scheduling office within 2 business days, please call [phone] for all locations, with the exception of [place], please call [phone].  Treatment: Evaluation & Treatment  Special Instructions/Modalities: Evaluate and treat right knee  Special Programs: Evaluate and treat right knee    Please be aware that coverage of these services is subject to the terms and limitations of your health insurance plan.  Call member services at your health plan with any benefit or coverage questions.      **Note to Provider:  If you are referring outside of [facility] for the therapy appointment, please list the name of the location in the  special instructions  above, print the referral and give to the patient to schedule the appointment.                  Follow-up notes from your care team     Return in about 2 weeks (around 3/7/2017).      Your next 10 appointments already scheduled     Feb 22, 2017  8:15 AM CST   (Arrive by 8:00 AM)   Return Visit with Estefania Martin RD   [facility] ([facility]  "[facility] )    [address] [place] [address] [id number] [phone]              Who to contact     If you have questions or need follow up information about today's clinic visit or your schedule please contact  ORTHOPEDICS directly at [phone].  Normal or non-critical lab and imaging results will be communicated to you by MyChart, letter or phone within 4 business days after the clinic has received the results. If you do not hear from us within 7 days, please contact the clinic through OpenSpan[employer] or phone. If you have a critical or abnormal lab result, we will notify you by phone as soon as possible.  Submit refill requests through Livekick or call your pharmacy and they will forward the refill request to us. Please allow 3 business days for your refill to be completed.          Additional Information About Your Visit        OpenSpan[employer] Information     Live[employer] gives you secure access to your electronic health record. If you see a primary care provider, you can also send messages to your care team and make appointments. If you have questions, please call your primary care clinic.  If you do not have a primary care provider, please call [phone] and they will assist you.        Care EveryWhere ID     This is your Care EveryWhere ID. This could be used by other organizations to access your [facility] medical records  [id number]        Your Vitals Were     Pulse Temperature Height Pulse Oximetry BMI (Body Mass Index)       79 97.6  F (36.4  C) (Tympanic) 6' 2.5\" (1.892 m) 97% 41.04 kg/m2        Blood Pressure from Last 3 Encounters:   02/21/17 120/70   02/07/17 128/72   01/31/17 116/70    Weight from Last 3 Encounters:   02/21/17 (!) 324 lb (147 kg)   02/07/17 (!) 342 lb 3.2 oz (155.2 kg)   01/31/17 (!) 313 lb (142 kg)              We Performed the Following     Large Joint/Bursa injection and/or drainage (Shoulder, Knee)     PHYSICAL THERAPY REFERRAL          Today's Medication Changes          These changes " are accurate as of: 2/21/17  2:02 PM.  If you have any questions, ask your nurse or doctor.               These medicines have changed or have updated prescriptions.        Dose/Directions    amLODIPine 10 MG tablet   Commonly known as:  NORVASC   This may have changed:  See the new instructions.   Used for:  Essential hypertension   Changed by:  Israel Meredith MD        TAKE 1 TABLET BY MOUTH DAILY   Quantity:  90 tablet   Refills:  3            Where to get your medicines      These medications were sent to [facility] [place] - [address]  [address] [place] [address]     Phone:  [phone]     amLODIPine 10 MG tablet                Primary Care Provider Office Phone # Fax #    Samira WhitneyELAN [phone]       [facility] [address] [address]        Thank you!     Thank you for choosing  ORTH[facility]EDICS  for your care. Our goal is always to provide you with excellent care. Hearing back from our patients is one way we can continue to improve our services. Please take a few minutes to complete the written survey that you may receive in the mail after your visit with us. Thank you!             Your Updated Medication List - Protect others around you: Learn how to safely use, store and throw away your medicines at www.disposemymeds.org.          This list is accurate as of: 2/21/17  2:02 PM.  Always use your most recent med list.                   Brand Name Dispense Instructions for use    amLODIPine 10 MG tablet    NORVASC    90 tablet    TAKE 1 TABLET BY MOUTH DAILY       aspirin 81 MG tablet      Take by mouth daily       augmented betamethasone dipropionate 0.05 % ointment    DIPROLENE-AF    45 g    Apply sparingly to affected area twice daily as needed.  Do not apply to face.       cetirizine 10 MG tablet    zyrTEC    30 tablet    TAKE 1 TABLET BY MOUTH EVERY EVENING       CLEAR-ATADINE 10 MG tablet   Generic drug:  loratadine     30 tablet     TAKE 1 TABLET BY MOUTH DAILY       cyclobenzaprine 10 MG tablet    FLEXERIL    90 tablet    TAKE 1 TABLET BY MOUTH NIGHTLY AS NEEDED FOR MUSCLE SPASMS       lisinopril 10 MG tablet    PRINIVIL/ZESTRIL    90 tablet    TAKE 1 TABLET BY MOUTH DAILY       Multi-vitamin Tabs tablet      Take 1 tablet by mouth daily       order for DME     1 Device    Equipment being ordered: knee sleeve       triamcinolone 0.1 % cream    KENALOG    80 g    Apply sparingly to affected area three times daily as needed       VITAMIN D (CHOLECALCIFEROL) PO      Take 4,000 Units by mouth daily

## 2017-02-22 ENCOUNTER — CARE COORDINATION (OUTPATIENT)
Dept: SURGERY | Facility: CLINIC | Age: 47
End: 2017-02-22

## 2017-02-22 ENCOUNTER — HOSPITAL ENCOUNTER (OUTPATIENT)
Dept: NUTRITION | Facility: HOSPITAL | Age: 47
Discharge: HOME | End: 2017-02-22
Attending: NURSE PRACTITIONER | Admitting: FAMILY MEDICINE
Payer: COMMERCIAL

## 2017-02-22 PROCEDURE — 97803 MED NUTRITION INDIV SUBSEQ: CPT | Performed by: DIETITIAN, REGISTERED

## 2017-02-22 NOTE — PROGRESS NOTES
Reviewed tasklist with client.    PREOPERATIVE WEIGHT LOSS SURGERY TASKLIST  Call Kirstin at [phone] for any questions regarding tasks on this list  Tentative surgery: Sleeve (previous band and band removal)  Approximate Month and Year: To be determined     Surgeon: Cher Mcnamaraative Insurance Coverage: Preferred One  Exclusions: None  __x__Seminar viewed.    __x__Research Consent Form signed.     __x__Registered on Jascha.     __x__Received Decision Making Handout to read.    __x__Received information about Support Group, 1st Wednesday of the month at  the [facility], 6:30p - 8:00pm.  __x__Letter of support from primary care provider.  1/10/17 from Dr Israel Meredith.     __x__Labs to be drawn by primary care provider:  1/10/17 done, in Epic.           Complete Blood Count (CBC), Comprehensive metabolic panel (CMP), Parathormone (PTH) and Vitamin D level.    ____Lose 20 lbs prior to surgery from the initial consult weight of 341 lbs. 2/22/17 = 324 lbs.    ____Exercise goal: 20 mins., 5 days a week; increase as tolerated.    _x__Dietician visit at initial consult - 10/30/15, 12/9/15, 2/3/16 Anabelle He RD           _x__Dietician visit month 2 - 3/2/16 Anabelle He RD  _x__Dietician visit month 3 - 4/8/16 Anabelle He RD  _x__Dietician visit month 4  - 5/25/16  Anabelle He RD                      _x__Dietician visit month 5 - 9/7/16 with Kirstie  _x__Dietician visit month 6 - 1/12/17 Estefania Martni RD  Instructed to continue to see RD until at goal weight.  _x__Psychologist evaluation - 12/10/15 with Dr Rony Rodriguez. 11/10/16 Instructed to get an updates. 2/22/17 Client waiting for Dr Rodriguez's office to call him back to schedule.              The week before you meet with the surgeon, watch the following online classes and call ANGELO Fulton at [phone] to inform her that you watched  them and to get any questions answered:  Instructed to view online classes after psych eval is updated.  ____Before Your  Weight Loss Surgery Online Class at [email or web address]  ____After Your Weight Loss Surgery Online Class at  [email or web address]  ____See your surgeon after all the above tasks are done and you are at your goal weight or within 5 lbs of your goal weight.  Call Kirstin at  [phone] to schedule the appointment and to inform her of when you have watched the online classes and when psych eval is done so appt can be scheduled.  ____After your visit with the surgeon, call eMño at [phone] to finalize the surgery date and schedule your final appointments to be 3 weeks before  your surgery date.      FINAL APPOINTMENTS  ____Weigh-in/Nurse Visit at Clinic 1E, 3 weeks before surgery.                Plan to be at your goal weight for this visit.  This can be done the same day you meet the surgeon if you are at your goal weight.  ____Pre-assessment Clinic, to be done 3 weeks before surgery. Meet with the anesthesia team. Your pre-operative history and physical can be done at this visit.     DISCLAIMER: Based on the evaluation results, the bariatric team decides whether and which bariatric surgery is the best option for you. Sometimes, even if you meet all of the pre-operative criteria, the bariatric team may still determine that in their medical judgement surgery is not recommended because of the risks to you.     CONTACT INFORMATION  _____If questions prior to surgery, call ANGELO Fulton at [phone].    _____If questions about insurance or scheduling surgery, call Meño at [phone].  _____If questions after surgery and to schedule 1E clinic appts, please call our  Call Center at [phone].  _____Fax: [phone] (preoperative documents, FMLA or return to work  forms).

## 2017-02-23 NOTE — PROGRESS NOTES
Met with patient on 2/22/17 for followup for preoperative weight loss surgery.    Patient has been tracking food intake and working at getting to goal weight of between 321-326 lbs.   Today is 324 lbs    Called Radha Quigley to let her know he has reached his goal weight.    Patient has RD contact information if needed further.  Will transfer care to RN in charge of care coordination for weight loss surgery.    Patient has RD contact information if needed further.    Estefania Martin RD

## 2017-02-28 ENCOUNTER — HOSPITAL ENCOUNTER (OUTPATIENT)
Dept: PHYSICAL THERAPY | Facility: HOSPITAL | Age: 47
Setting detail: THERAPIES SERIES
End: 2017-02-28
Attending: ORTHOPAEDIC SURGERY
Payer: OTHER MISCELLANEOUS

## 2017-02-28 PROCEDURE — 97110 THERAPEUTIC EXERCISES: CPT | Mod: GP | Performed by: PHYSICAL THERAPIST

## 2017-02-28 PROCEDURE — 97161 PT EVAL LOW COMPLEX 20 MIN: CPT | Mod: GP | Performed by: PHYSICAL THERAPIST

## 2017-02-28 ASSESSMENT — ACTIVITIES OF DAILY LIVING (ADL)
RAW_SCORE: 45
GIVING WAY, BUCKLING OR SHIFTING OF KNEE: THE SYMPTOM AFFECTS MY ACTIVITY SLIGHTLY
WEAKNESS: I HAVE THE SYMPTOM BUT IT DOES NOT AFFECT MY ACTIVITY
RISE FROM A CHAIR: ACTIVITY IS VERY DIFFICULT
STIFFNESS: I DO NOT HAVE THE SYMPTOM
KNEEL ON THE FRONT OF YOUR KNEE: ACTIVITY IS VERY DIFFICULT
KNEE_ACTIVITY_OF_DAILY_LIVING_SCORE: 64.29
STAND: ACTIVITY IS MINIMALLY DIFFICULT
SQUAT: ACTIVITY IS VERY DIFFICULT
HOW_WOULD_YOU_RATE_THE_CURRENT_FUNCTION_OF_YOUR_KNEE_DURING_YOUR_USUAL_DAILY_ACTIVITIES_ON_A_SCALE_FROM_0_TO_100_WITH_100_BEING_YOUR_LEVEL_OF_KNEE_FUNCTION_PRIOR_TO_YOUR_INJURY_AND_0_BEING_THE_INABILITY_TO_PERFORM_ANY_OF_YOUR_USUAL_DAILY_ACTIVITIES?: 50
WALK: ACTIVITY IS NOT DIFFICULT
PAIN: I HAVE THE SYMPTOM BUT IT DOES NOT AFFECT MY ACTIVITY
GO UP STAIRS: ACTIVITY IS MINIMALLY DIFFICULT
LIMPING: I HAVE THE SYMPTOM BUT IT DOES NOT AFFECT MY ACTIVITY
AS_A_RESULT_OF_YOUR_KNEE_INJURY,_HOW_WOULD_YOU_RATE_YOUR_CURRENT_LEVEL_OF_DAILY_ACTIVITY?: ABNORMAL
GO DOWN STAIRS: ACTIVITY IS MINIMALLY DIFFICULT
SWELLING: I HAVE THE SYMPTOM BUT IT DOES NOT AFFECT MY ACTIVITY
HOW_WOULD_YOU_RATE_THE_OVERALL_FUNCTION_OF_YOUR_KNEE_DURING_YOUR_USUAL_DAILY_ACTIVITIES?: ABNORMAL
SIT WITH YOUR KNEE BENT: ACTIVITY IS VERY DIFFICULT
KNEE_ACTIVITY_OF_DAILY_LIVING_SUM: 45

## 2017-02-28 NOTE — PROGRESS NOTES
" 02/28/17 1100   General Information   Type of Visit Initial OP Ortho PT Evaluation   Start of Care Date 02/28/17   Referring Physician Dr. Dickey   Patient/Family Goals Statement To have full use of my knees   Orders Evaluate and Treat   Orders Comment Evaluate and treate right knee   Date of Order 02/21/17   Insurance Type Other  (Work Comp)   Insurance Comments/Visits Authorized 12   Medical Diagnosis Articular cartilage disorder of knee, right   Surgical/Medical history reviewed Yes   Precautions/Limitations no known precautions/limitations   General Information Comments Past medication history includes high blood pressure and OA.    Body Part(s)   Body Part(s) Knee   Presentation and Etiology   Pertinent history of current problem (include personal factors and/or comorbidities that impact the POC) Patient presents to physical therapy following a fall on 2/6/2017 while he was at work. He was up on a snow pile where he slipped at work. He doesn't remember how he landed but said he felt like something popped. Since the date of injury he has been into the physicians office where they removed fluid and took imaging. He stated into today's session that his \"ligaments are stuck between the bones\" and will eventually rip. He has no issues with sleep. Knee has buckled 1-2 times since onset. Denies numbness and tingling. 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    Impairments A. Pain;D. Decreased ROM;F. Decreased strength and endurance   Functional Limitations perform required work activities;perform activities of daily living   Symptom Location Right knee primarily superior lateral aspect and inferior to patella    How/Where did it occur With a fall   Onset date of current episode/exacerbation 02/06/17   Chronicity New   Pain rating (0-10 point scale) Best (/10);Worst (/10)   Best " (/10) 0   Worst (/10) 7   Pain quality B. Dull   Frequency of pain/symptoms A. Constant   Pain/symptoms are: Other   Pain symptoms comment Worse with activity   Pain/symptoms exacerbated by I. Bending;L. Work tasks   Pain/symptoms eased by C. Rest   Progression of symptoms since onset: Improved   Current / Previous Interventions   Diagnostic Tests: X-ray;MRI   X-ray Results Results   X-ray results See results   MRI Results Results   MRI results See results   Prior Level of Function   Prior Level of Function-Mobility Independent   Prior Level of Function-ADLs Independent   Current Level of Function   Patient role/employment history A. Employed   Employment Comments Works for [employer] He is an     Living environment [employer]   Current equipment-Gait/Locomotion None   Current equipment-ADL None   Fall Risk Screen   Fall screen completed by PT   Per patient - Fall 2 or more times in past year? No   Per patient - Fall with injury in past year? Yes   Is patient a fall risk? No   Knee Objective Findings   Side (if bilateral, select both right and left) Right   Integumentary  Mild swelling at the superior/lateral aspect of the knee   Posture Rounded shoulders   Balance/Proprioception (Single Leg Stance) Able to maintain 10 seconds with no difficulty   Lachmans Test Negative   Anterior Drawer Test Negative   Posterior Drawer Test Negative   Varus Stress Test Negative   Valgus Stress Test Negative   Knee Special Test Comments Thesalley's: negative   Palpation Tender near superior/lateral aspect of knee near.    Accessory Motion/Joint Mobility Unremarkable   Right Knee Extension AROM WNL   Right Knee Extension PROM WNL   Right Knee Flexion AROM 112 degrees before pain felt   Right Knee Flexion PROM 120 degrees before pain felt   Right Knee Flexion Strength 5/5   Right Knee Extension Strength 5/5 with pain near the patellar tendon   Right Hip Abduction Strength 5/5   Right Quad Set Strength Normal    Right Hamstring Flexibility min limited    Planned Therapy Interventions   Planned Therapy Interventions joint mobilization;manual therapy;ROM;strengthening;stretching   Planned Modality Interventions   Planned Modality Interventions Cryotherapy;Electrical stimulation;TENS;Ultrasound   Clinical Impression   Criteria for Skilled Therapeutic Interventions Met yes, treatment indicated   PT Diagnosis Right knee pain due to articular cartilage damage   Influenced by the following impairments pain, decreased functional mobility   Functional limitations due to impairments difficulty with squatting, walking on uneven surfaces, ascending/descending stairs   Clinical Presentation Stable/Uncomplicated   Clinical Presentation Rationale minimal comorbidities, one treatment area   Clinical Decision Making (Complexity) Low complexity   Therapy Frequency 2 times/Week   Predicted Duration of Therapy Intervention (days/wks) 6 weeks   Risk & Benefits of therapy have been explained Yes   Patient, Family & other staff in agreement with plan of care Yes   Clinical Impression Comments Signs and symptoms consistent with right knee pain following an fall. Stable and improving condition since the onset.    ORTHO GOALS   PT Ortho Eval Goals 1;2;3;4   Ortho Goal 1   Goal Identifier STG 1   Goal Description Patient will demonstrate independence and compliance with his HEP in order to decrease the pain and improve his functional mobility to return to work   Target Date 03/14/17   Ortho Goal 2   Goal Identifier LTG 1   Goal Description Patient will be able to ascende/descend a flight of stairs with pain no greater than 2/10 in order to improve his efficiency and safety while out in the community.    Target Date 04/11/17   Ortho Goal 3   Goal Identifier LTG 2   Goal Description Patient will be able to squat down to the floor to lift 20 lbs and lift up to a waist height surface with pain no greater than 2/10 in order to be able to lift boxes at  work to a truck bed.    Target Date 04/11/17   Ortho Goal 4   Goal Identifier LTG 3   Goal Description Patient will be able to ambulate over 2 miles on even and uneven surfaces with pain no greater than 2/10 in order to perform daily functions at work.    Target Date 04/11/17   Total Evaluation Time   Total Evaluation Time 30

## 2017-03-02 ENCOUNTER — HOSPITAL ENCOUNTER (OUTPATIENT)
Dept: PHYSICAL THERAPY | Facility: HOSPITAL | Age: 47
Setting detail: THERAPIES SERIES
End: 2017-03-02
Attending: ORTHOPAEDIC SURGERY
Payer: OTHER MISCELLANEOUS

## 2017-03-02 PROCEDURE — 97110 THERAPEUTIC EXERCISES: CPT | Mod: GP

## 2017-03-02 PROCEDURE — 40000718 ZZHC STATISTIC PT DEPARTMENT ORTHO VISIT

## 2017-03-07 ENCOUNTER — HOSPITAL ENCOUNTER (OUTPATIENT)
Dept: PHYSICAL THERAPY | Facility: HOSPITAL | Age: 47
Setting detail: THERAPIES SERIES
End: 2017-03-07
Attending: ORTHOPAEDIC SURGERY
Payer: OTHER MISCELLANEOUS

## 2017-03-07 PROCEDURE — 97110 THERAPEUTIC EXERCISES: CPT | Mod: GP | Performed by: PHYSICAL THERAPIST

## 2017-03-07 NOTE — PROGRESS NOTES
Outpatient Physical Therapy Progress Note     Patient: Jason Duncan  : 1970    Beginning/End Dates of Reporting Period:  2017 to 3/7/2017    Referring Provider: Dr. Dickey    Therapy Diagnosis: Articular cartilage disorder of right knee     Client Self Report: Patient seen from 8:30-8:55. Reports that he is seeing decreased pain when going down stairs but other than that no improvement since physical therapy started last week. Pain has moved more to the medial aspect of the knee. Has difficulty going up stairs, squatting, and bending that knee in single leg stance. Doesn't feel like walking on stable ground bothers him but hasn't tested it on unstable ground yet. Had cracking in the knee over the past week that is minimally painful and improved after a few seconds. Still has pain at end ranges of flexion. Hasn't had any increased swelling since the initial day of therapy. No new changes in status to this point.     Objective Measurements:  Objective Measure: Knee ROM  Details: Flexion: 122 degrees. Extension: WFL  Objective Measure: Knee and hip strength  Details: All seating knee and hip strengthening 5/5  Objective Measure: Ascending/descending stairs  Details: Patient gets pain in the anterior aspect of the knee right below the patella when ascending stairs. Pain starts during single leg stance on right when bringing the left foot through during step through pattern. Pain does not alter form or technique to make patient unsafe during activity.   Objective: HEP compliance  Details: Jason has been compliance with HEP. He eased up on some of the exercises because he was having increased pain.  Now we are focusing on mostly quad, glute med, leg press, biking, squatting and stairs without pain and he is showing some improvement.    Goals:  Goal Identifier STG 1   Goal Description Patient will demonstrate independence and compliance with his HEP in order to decrease the pain and improve his functional  mobility to return to work   Target Date 03/14/17   Date Met  03/07/17   Progress: Expresses and demonstrates compliance with HEP      Goal Identifier LTG 1   Goal Description Patient will be able to ascend/descend a flight of stairs with pain no greater than 2/10 in order to improve his efficiency and safety while out in the community.    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    Target Date 04/11/17   Date Met      Progress: Pain with squatting and and bending of the knee with weight     Goal Identifier LTG 3   Goal Description Patient will be able to ambulate over 2 miles on even and uneven surfaces with pain no greater than 2/10 in order to perform daily functions at work.    Target Date 04/11/17   Date Met      Progress: Not assessed on uneven surfaces to this point     Progress Toward Goals:   Only had 2 visits up to this point. Patient has expressed compliance and independence with his HEP. Still having pain that is currently limiting his function    Plan:  Continue therapy per current plan of care.    Discharge:  No    Therapist would like to continue to see patient in physical therapy to address his function on uneven surfaces and reduce his pain with activities such as squatting and ascending stairs. To this point, he has made progress with some pain relief with certain activities but hasn't made any major improvements to this point after 2 visits plus the initial evaluation.     Patient was treated under the direct supervision and guidance of Zoe MORENOT, OCS

## 2017-03-08 ENCOUNTER — OFFICE VISIT (OUTPATIENT)
Dept: ORTHOPEDICS | Facility: OTHER | Age: 47
End: 2017-03-08
Attending: ORTHOPAEDIC SURGERY
Payer: OTHER MISCELLANEOUS

## 2017-03-08 VITALS
SYSTOLIC BLOOD PRESSURE: 114 MMHG | WEIGHT: 315 LBS | DIASTOLIC BLOOD PRESSURE: 70 MMHG | BODY MASS INDEX: 39.17 KG/M2 | OXYGEN SATURATION: 95 % | HEART RATE: 68 BPM | HEIGHT: 75 IN | TEMPERATURE: 96.8 F

## 2017-03-08 DIAGNOSIS — M23.91 ARTICULAR CARTILAGE DISORDER OF KNEE, RIGHT: Primary | ICD-10-CM

## 2017-03-08 PROCEDURE — 99213 OFFICE O/P EST LOW 20 MIN: CPT | Performed by: ORTHOPAEDIC SURGERY

## 2017-03-08 ASSESSMENT — PAIN SCALES - GENERAL: PAINLEVEL: MILD PAIN (2)

## 2017-03-08 NOTE — NURSING NOTE
"Chief Complaint   Patient presents with     RECHECK     Workmans comp follow up right knee MRI.       Initial /70 (BP Location: Right arm, Patient Position: Chair, Cuff Size: Adult Large)  Pulse 68  Temp 96.8  F (36  C) (Tympanic)  Ht 6' 2.5\" (1.892 m)  Wt (!) 325 lb (147.4 kg)  SpO2 95%  BMI 41.17 kg/m2 Estimated body mass index is 41.17 kg/(m^2) as calculated from the following:    Height as of this encounter: 6' 2.5\" (1.892 m).    Weight as of this encounter: 325 lb (147.4 kg).  Medication Reconciliation: complete   Bozena Cardona LPN      "

## 2017-03-08 NOTE — MR AVS SNAPSHOT
After Visit Summary   3/8/2017    Jason Duncan    MRN: [id number]           Patient Information     Date Of Birth          1970        Visit Information        Provider Department      3/8/2017 8:00 AM Juan Ramon Dickey MD  [facility]         Follow-ups after your visit        Your next 10 appointments already scheduled     Mar 09, 2017  8:30 AM CST   Treatment with Zoe Humphrey PT   [facility] ([facility] )    [address] [id number] [phone]            Apr 12, 2017  8:20 AM CDT   (Arrive by 8:00 AM)   Return Visit with Juan Ramon Dickey MD    [facility] ([facility])    [address] [address] [phone]              Who to contact     If you have questions or need follow up information about today's clinic visit or your schedule please contact  ORTH[facility]EDICS directly at [phone].  Normal or non-critical lab and imaging results will be communicated to you by Escapiahart, letter or phone within 4 business days after the clinic has received the results. If you do not hear from us within 7 days, please contact the clinic through [employer] or phone. If you have a critical or abnormal lab result, we will notify you by phone as soon as possible.  Submit refill requests through [employer] or call your pharmacy and they will forward the refill request to us. Please allow 3 business days for your refill to be completed.          Additional Information About Your Visit        MyChart Information     [employer]umas gives you secure access to your electronic health record. If you see a primary care provider, you can also send messages to your care team and make appointments. If you have questions, please call your primary care clinic.  If you do not have a primary care provider, please call [phone] and they will assist you.        Care EveryWhere ID     This is your Care EveryWhere ID. This could be used by other organizations  "to access your [place] medical records  [id number]        Your Vitals Were     Pulse Temperature Height Pulse Oximetry BMI (Body Mass Index)       68 96.8  F (36  C) (Tympanic) 6' 2.5\" (1.892 m) 95% 41.17 kg/m2        Blood Pressure from Last 3 Encounters:   03/08/17 114/70   02/21/17 120/70   02/07/17 128/72    Weight from Last 3 Encounters:   03/08/17 (!) 325 lb (147.4 kg)   02/21/17 (!) 324 lb (147 kg)   02/07/17 (!) 342 lb 3.2 oz (155.2 kg)              Today, you had the following     No orders found for display       Primary Care Provider Office Phone # Fax #    Samira MartinELAN dunn [phone]       [address] [address]        Thank you!     Thank you for choosing  [facility]  for your care. Our goal is always to provide you with excellent care. Hearing back from our patients is one way we can continue to improve our services. Please take a few minutes to complete the written survey that you may receive in the mail after your visit with us. Thank you!             Your Updated Medication List - Protect others around you: Learn how to safely use, store and throw away your medicines at www.disposemymeds.org.          This list is accurate as of: 3/8/17  8:04 AM.  Always use your most recent med list.                   Brand Name Dispense Instructions for use    amLODIPine 10 MG tablet    NORVASC    90 tablet    TAKE 1 TABLET BY MOUTH DAILY       aspirin 81 MG tablet      Take by mouth daily       augmented betamethasone dipropionate 0.05 % ointment    DIPROLENE-AF    45 g    Apply sparingly to affected area twice daily as needed.  Do not apply to face.       cetirizine 10 MG tablet    zyrTEC    30 tablet    TAKE 1 TABLET BY MOUTH EVERY EVENING       CLEAR-ATADINE 10 MG tablet   Generic drug:  loratadine     30 tablet    TAKE 1 TABLET BY MOUTH DAILY       cyclobenzaprine 10 MG tablet    FLEXERIL    90 tablet    TAKE 1 TABLET BY MOUTH NIGHTLY AS NEEDED FOR MUSCLE " SPASMS       lisinopril 10 MG tablet    PRINIVIL/ZESTRIL    90 tablet    TAKE 1 TABLET BY MOUTH DAILY       Multi-vitamin Tabs tablet      Take 1 tablet by mouth daily       order for DME     1 Device    Equipment being ordered: knee sleeve       triamcinolone 0.1 % cream    KENALOG    80 g    Apply sparingly to affected area three times daily as needed       VITAMIN D (CHOLECALCIFEROL) PO      Take 4,000 Units by mouth daily

## 2017-03-08 NOTE — PROGRESS NOTES
Occupational Visit     Chief complaint:  #1.  Work-related articular cartilage injury right knee February 6, 2017   #2.  Pre-existing mild medial compartment primary osteoarthritis right knee    Subjective: This 47-year-old right-handed  for [employer] had no problems with his right knee until February 6, 2017 when he was in the course of his employment and fell onto his right side injuring his right knee.  The onset of pain was instant.  He reported the injury to his employer.  The next day he saw Samira Whitney NP who obtained x-rays and an MRI.  He presented to me on February 21, 2017.  His right knee had an effusion.  His range of motion was  .  The plain films showed mild joint space narrowing medially indicating pre-existing early DJD.  The MRI showed an effusion, and 2 full-thickness articular cartilage defects: 1 in the trochlear groove and the other on the medial femoral condyle.  I aspirated the knee and referred him to physical therapy.    Today he reports he has had no new injury to his knee.  He has only had 3 physical therapy visits: 1 assessment and two therapeutic visits.  Overall he has less right knee pain except when going upstairs or squatting.  Under those circumstances his pain is unchanged in severity.  It is located retropatellar and anteromedial.    Nothing is changed with respect to his neurologic or musculoskeletal review of systems since seen last.    Examination: Overweight but otherwise healthy-appearing male with appropriate mood and affect.  Skin around the right knee is intact and free of erythema.  The right knee has no significant effusion today.  It is tender to palpation along the articular surface of the medial femoral condyle and along the medial joint line.  Range of motion is 0-122 : A significant improvement.  Extension strength is 5 minus.  The neurovascular status of the limb is intact.    Impression: Insufficient rehabilitation    Plan: He will continue  physical therapy and return in 5 weeks for recheck.  By then he will completed 6 weeks of PT.  If he is not improved surgery will be considered.  In the meantime he has no work capacity.

## 2017-03-09 ENCOUNTER — HOSPITAL ENCOUNTER (OUTPATIENT)
Dept: PHYSICAL THERAPY | Facility: HOSPITAL | Age: 47
Setting detail: THERAPIES SERIES
End: 2017-03-09
Attending: ORTHOPAEDIC SURGERY
Payer: OTHER MISCELLANEOUS

## 2017-03-09 PROCEDURE — 97110 THERAPEUTIC EXERCISES: CPT | Mod: GP | Performed by: PHYSICAL THERAPIST

## 2017-03-14 ENCOUNTER — HOSPITAL ENCOUNTER (OUTPATIENT)
Dept: PHYSICAL THERAPY | Facility: HOSPITAL | Age: 47
Setting detail: THERAPIES SERIES
End: 2017-03-14
Attending: ORTHOPAEDIC SURGERY
Payer: OTHER MISCELLANEOUS

## 2017-03-14 DIAGNOSIS — T78.40XA ALLERGIC REACTION, INITIAL ENCOUNTER: ICD-10-CM

## 2017-03-14 PROCEDURE — 40000718 ZZHC STATISTIC PT DEPARTMENT ORTHO VISIT

## 2017-03-14 PROCEDURE — 97110 THERAPEUTIC EXERCISES: CPT | Mod: GP

## 2017-03-16 ENCOUNTER — HOSPITAL ENCOUNTER (OUTPATIENT)
Dept: PHYSICAL THERAPY | Facility: HOSPITAL | Age: 47
Setting detail: THERAPIES SERIES
End: 2017-03-16
Attending: ORTHOPAEDIC SURGERY
Payer: OTHER MISCELLANEOUS

## 2017-03-16 ENCOUNTER — TELEPHONE (OUTPATIENT)
Dept: SURGERY | Facility: CLINIC | Age: 47
End: 2017-03-16

## 2017-03-16 PROCEDURE — 40000718 ZZHC STATISTIC PT DEPARTMENT ORTHO VISIT

## 2017-03-16 PROCEDURE — 97110 THERAPEUTIC EXERCISES: CPT | Mod: GP

## 2017-03-16 NOTE — TELEPHONE ENCOUNTER
PLAN:  Anticipate psych eval completed in April.  See surgeon for PBS in April.  OR in late April/ May.

## 2017-03-21 ENCOUNTER — HOSPITAL ENCOUNTER (OUTPATIENT)
Dept: PHYSICAL THERAPY | Facility: HOSPITAL | Age: 47
Setting detail: THERAPIES SERIES
End: 2017-03-21
Attending: ORTHOPAEDIC SURGERY
Payer: OTHER MISCELLANEOUS

## 2017-03-21 PROCEDURE — 97110 THERAPEUTIC EXERCISES: CPT | Mod: GP

## 2017-03-21 PROCEDURE — 40000718 ZZHC STATISTIC PT DEPARTMENT ORTHO VISIT

## 2017-03-23 ENCOUNTER — HOSPITAL ENCOUNTER (OUTPATIENT)
Dept: PHYSICAL THERAPY | Facility: HOSPITAL | Age: 47
Setting detail: THERAPIES SERIES
End: 2017-03-23
Attending: ORTHOPAEDIC SURGERY
Payer: OTHER MISCELLANEOUS

## 2017-03-23 PROCEDURE — 97110 THERAPEUTIC EXERCISES: CPT | Mod: GP

## 2017-03-23 PROCEDURE — 40000718 ZZHC STATISTIC PT DEPARTMENT ORTHO VISIT

## 2017-03-27 ENCOUNTER — TELEPHONE (OUTPATIENT)
Dept: SURGERY | Facility: CLINIC | Age: 47
End: 2017-03-27

## 2017-03-27 NOTE — TELEPHONE ENCOUNTER
----- Message from Meño Yee sent at 3/24/2017 11:58 AM CDT -----  Regarding: viewed class  I spoke with Jason today and walked him through how to view the online class. He has completed both today. He said he would like to do the band. He said he didn't realize we offer that. I told him we don't do them very often anymore, hardly at all in fact, and told him he could discuss this with Dr. Kwon if he felt the sleeve was not what he wanted.

## 2017-03-28 ENCOUNTER — HOSPITAL ENCOUNTER (OUTPATIENT)
Dept: PHYSICAL THERAPY | Facility: HOSPITAL | Age: 47
Setting detail: THERAPIES SERIES
End: 2017-03-28
Attending: ORTHOPAEDIC SURGERY
Payer: OTHER MISCELLANEOUS

## 2017-03-28 PROCEDURE — 40000718 ZZHC STATISTIC PT DEPARTMENT ORTHO VISIT

## 2017-03-28 PROCEDURE — 97110 THERAPEUTIC EXERCISES: CPT | Mod: GP

## 2017-03-29 ENCOUNTER — TRANSFERRED RECORDS (OUTPATIENT)
Dept: HEALTH INFORMATION MANAGEMENT | Facility: CLINIC | Age: 47
End: 2017-03-29

## 2017-03-30 ENCOUNTER — HOSPITAL ENCOUNTER (OUTPATIENT)
Dept: PHYSICAL THERAPY | Facility: HOSPITAL | Age: 47
Setting detail: THERAPIES SERIES
End: 2017-03-30
Attending: ORTHOPAEDIC SURGERY
Payer: OTHER MISCELLANEOUS

## 2017-03-30 PROCEDURE — 97110 THERAPEUTIC EXERCISES: CPT | Mod: GP

## 2017-03-30 PROCEDURE — 40000718 ZZHC STATISTIC PT DEPARTMENT ORTHO VISIT

## 2017-03-30 NOTE — TELEPHONE ENCOUNTER
After Visit Summary   3/30/2017    Jennifer Ferrera    MRN: [id number]           Patient Information     Date Of Birth          11/23/1929        Visit Information        Provider Department      3/30/2017 3:15 PM Noe Altman MD [facility]        Today's Diagnoses     Lateral pain of left hip    -  1       Follow-ups after your visit        Additional Services     PHYSICAL THERAPY REFERRAL (External-Prints)       Physical Therapy Referral    Evaluate and treat.     Gluteus medius strengthening                  Your next 10 appointments already scheduled     Sep 29, 2017  2:15 PM CDT   (Arrive by 2:00 PM)   Return Movement Disorder with Iban Neves MD   [facility] ([facility] [place])    [address] [address] [phone]              Who to contact     Please call your clinic at [phone] to:    Ask questions about your health    Make or cancel appointments    Discuss your medicines    Learn about your test results    Speak to your doctor   If you have compliments or concerns about an experience at your clinic, or if you wish to file a complaint, please contact [facility] Patient Relations at [phone] or email us at [email or web address]         Additional Information About Your Visit        MyChart Information     [employer] gives you secure access to your electronic health record. If you see a primary care provider, you can also send messages to your care team and make appointments. If you have questions, please call your primary care clinic.  If you do not have a primary care provider, please call [phone] and they will assist you.      Jumpido is an electronic gateway that provides easy, online access to your medical records. With Jumpido, you can request a clinic appointment, read your test results, renew a prescription or communicate with your care team.     To  ----- Message from Meño Yee sent at 3/9/2017  8:33 AM CST -----  Regarding: update  I spoke with Jason this morning. He has his psychological evaluation scheduled 3/29/17 with Rony Rodriguez, was told he would do it all in one day. He is doing excellent with his weight loss, goal is 321, is 324. He is having issues with his knee but is still pursuing the bariatric surgery. His task list is complete after the psychological.   "access your existing account, please contact your [facility] or call [phone] for assistance.        Care EveryWhere ID     This is your Care EveryWhere ID. This could be used by other organizations to access your [place] medical records  [id number]        Your Vitals Were     Height BMI (Body Mass Index)                5' 3\" (1.6 m) 36.49 kg/m2           Blood Pressure from Last 3 Encounters:   03/21/17 167/57   11/22/16 180/56   11/08/16 154/69    Weight from Last 3 Encounters:   03/30/17 206 lb (93.4 kg)   03/29/17 206 lb (93.4 kg)   11/08/16 206 lb 9.1 oz (93.7 kg)              We Performed the Following     PHYSICAL THERAPY REFERRAL (External-Prints)        Primary Care Provider Office Phone # Fax #    Michelle Carvajal [phone]       [facility] [address] [facility] [address]  [facility] [address]        Thank you!     Thank you for choosing [facility]  for your care. Our goal is always to provide you with excellent care. Hearing back from our patients is one way we can continue to improve our services. Please take a few minutes to complete the written survey that you may receive in the mail after your visit with us. Thank you!             Your Updated Medication List - Protect others around you: Learn how to safely use, store and throw away your medicines at www.disposemymeds.org.          This list is accurate as of: 3/30/17  3:37 PM.  Always use your most recent med list.                   Brand Name Dispense Instructions for use    ALEVE 220 MG tablet   Generic drug:  naproxen sodium      Take 1 tablet by mouth 2 times daily (with meals).       aspirin 81 MG tablet     90 tablet    Take 1 tablet (81 mg) by mouth daily       atenolol 50 MG tablet    TENORMIN    45 tablet    Taking 0.5 tablet daily       escitalopram 10 MG tablet    LEXAPRO    90 tablet    Take 1 tablet (10 mg) by mouth daily       HYDROcodone-acetaminophen 5-325 MG " per tablet    NORCO    60 tablet    Take 1-2 tablets by mouth every 6 hours as needed for pain.       IBANdronate 150 MG tablet    BONIVA    1 tablet    Take 1 tablet (150 mg) by mouth every 30 days       Levothyroxine Sodium 75 MCG Caps     30 capsule    Take 75 mcg by mouth daily       omeprazole 20 MG tablet     90 tablet    Take 1 tablet (20 mg) by mouth daily Take 30-60 minutes before a meal.       oxyCODONE 5 MG IR tablet    ROXICODONE    18 tablet    Take 1 tablet (5 mg) by mouth every 4 hours as needed for pain       pravastatin 20 MG tablet    PRAVACHOL    90 tablet    Take 1 tablet (20 mg) by mouth daily       REFRESH 1 % ophthalmic solution   Generic drug:  carboxymethylcellulose     1 Bottle    Place 1 drop into both eyes 2 times daily as needed       vitamin D 400 UNITS capsule     90 capsule    1 tablet daily

## 2017-04-04 ENCOUNTER — HOSPITAL ENCOUNTER (OUTPATIENT)
Dept: PHYSICAL THERAPY | Facility: HOSPITAL | Age: 47
Setting detail: THERAPIES SERIES
End: 2017-04-04
Attending: ORTHOPAEDIC SURGERY
Payer: OTHER MISCELLANEOUS

## 2017-04-04 PROCEDURE — 97110 THERAPEUTIC EXERCISES: CPT | Mod: GP

## 2017-04-04 PROCEDURE — 40000718 ZZHC STATISTIC PT DEPARTMENT ORTHO VISIT

## 2017-04-06 ENCOUNTER — HOSPITAL ENCOUNTER (OUTPATIENT)
Dept: PHYSICAL THERAPY | Facility: HOSPITAL | Age: 47
Setting detail: THERAPIES SERIES
End: 2017-04-06
Attending: ORTHOPAEDIC SURGERY
Payer: OTHER MISCELLANEOUS

## 2017-04-06 PROCEDURE — 97110 THERAPEUTIC EXERCISES: CPT | Mod: GP | Performed by: PHYSICAL THERAPIST

## 2017-04-06 NOTE — PROGRESS NOTES
Outpatient Physical Therapy Discharge Note     Patient: Jason Duncan  : 1970    Beginning/End Dates of Reporting Period:  3/7/2017 to 2017    Referring Provider: Dr Dickey    Therapy Diagnosis: R knee pain, articular cartilage disorder     Client Self Report: Patient was seen from 8:30-9am.  Reports he feels overall 85% improved since starting PT. States he has not had difficulty with uneven surfaces and his knee has been relatively painfree except for an episode when it buckled going up stairs from his garage 2 days ago. States he did not fall on his knee, but it gave and then was sore. His pain has gone away since then and today he is feeling very good.  He has been performing strengthening and stretching exercises for his quads, glutes and hip ABDs and states that they have been helpful. His worst PL is roughly 1-2/10. Feels he is only limited by some residual discomfort when his knee goes beyond his toes and still feeling slightly unstable.    Objective Measurements:  Objective Measure: Knee ROM, strength, HEP, PL  Details: AROM: 0-128 today without discomfort following stretching and patellar mobs.  PL is 1-2/10 while performing squats - following repeated passive knee EXT stretch/joint reset, 0/10 PL with cues to keep knees behind toes.  Patient is performing quad, glute and hip ABD strengthening at home. He does strengthening on our leg press machine, lateral band walking, lateral step downs and work on BOSU and foam to strengthen him on uneven surfaces.  He still requires cueing on proper squat mechanics as he demonstrates anterior knee excursion, but when he corrects his form he has no pain.  I would like to see him for 2 more sessions of strengthening then he should be ready for DC and return to work at full capacity.     Goals:  Goal Identifier STG 1   Goal Description Patient will demonstrate independence and compliance with his HEP in order to decrease the pain and improve his  functional mobility to return to work   Target Date 03/14/17   Date Met  03/07/17   Progress:     Goal Identifier LTG 1   Goal Description Patient will be able to ascend/descend a flight of stairs with pain no greater than 2/10 in order to improve his efficiency and safety while out in the community.    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    Target Date 04/27/17   Date Met   (Still slightly impaired squat mechanics causing anterior harry)   Progress:     Goal Identifier LTG 3   Goal Description Patient will be able to ambulate over 2 miles on even and uneven surfaces with pain no greater than 2/10 in order to perform daily functions at work.    Target Date 04/27/17   Date Met   (Improving, but still needs to be a little stronger)   Progress:       Progress Toward Goals:   Progress this reporting period: Patient has made good progress.       Plan:  DC    Discharge:  Yes - patient has met all goals

## 2017-04-11 ENCOUNTER — CARE COORDINATION (OUTPATIENT)
Dept: SURGERY | Facility: CLINIC | Age: 47
End: 2017-04-11

## 2017-04-11 NOTE — PROGRESS NOTES
Left message for client to call coordinator. Can set up PBS appt with Dr Kwon. Patient called back.  Reviewed plan with client.  To meet with Dr Kwon and nurse on 4/20/17.  If openings, to also meet with dietician.  If no RD visits become available for that day, then to receive RD postop teaching on the day of PAC visits.     PREOPERATIVE WEIGHT LOSS SURGERY TASKLIST  Call Kirstin at [phone] for any questions regarding tasks on this list  Tentative surgery: Sleeve or band (previous band and band removal) - to discuss with Dr Kwon  Approximate Month and Year: To be determined     Surgeon: Cher  Tentative Insurance Coverage: [employer]  Exclusions: None  __x__Seminar viewed.    __x__Research Consent Form signed.     __x__Registered on [employer].     __x__Received Decision Making Handout to read.    __x__Received information about Support Group, 1st Wednesday of the month at  the [facility], 6:30p - 8:00pm.  __x__Letter of support from primary care provider.  1/10/17 from Dr Israel Meredith.      __x__Labs to be drawn by primary care provider:  1/10/17 done, in Epic.           Complete Blood Count (CBC), Comprehensive metabolic panel (CMP), Parathormone (PTH) and Vitamin D level.    ____Lose 20 lbs prior to surgery from the initial consult weight of 341 lbs. 2/22/17 = 324 lbs.    ____Exercise goal: 20 mins., 5 days a week; increase as tolerated.    _x__Dietician visit at initial consult - 10/30/15, 12/9/15, 2/3/16 Anabelle He RD           _x__Dietician visit month 2 - 3/2/16 Anabelle He, ROBERT  _x__Dietician visit month 3 - 4/8/16 Anabelle He RD  _x__Dietician visit month 4  - 5/25/16  Anabelle He RD                      _x__Dietician visit month 5 - 9/7/16 with Kirstie  _x__Dietician visit month 6 - 1/12/17 Estefania Martin RD  Instructed to continue to see RD until at goal weight.  _x__Psychologist evaluation - 12/10/15 with Dr Rony Rodriguez. 11/10/16 Instructed to get an updates. 2/22/17 Client waiting  for Dr Rodriguez's office to call him back to schedule.  3/29/17 Have updated evaluation with clearance.              The week before you meet with the surgeon, watch the following online classes and call ANGELO Fulton at [phone] to inform her that you watched  them and to get any questions answered: Instructed to view online classes after psych eval is updated.  _x___Before Your Weight Loss Surgery Online Class at [email or web address]  _x___After Your Weight Loss Surgery Online Class at  [email or web address]  ____See your surgeon after all the above tasks are done and you are at your goal weight or within 5 lbs of your goal weight.  Call Kirstin at  [phone] to schedule the appointment and to inform her of when you have watched the online classes and when psych eval is done so appt can be scheduled.  ____After your visit with the surgeon, call Meño at [phone] to finalize the surgery date and schedule your final appointments to be 3 weeks before  your surgery date.       FINAL APPOINTMENTS  ____Weigh-in/Nurse Visit at Clinic 1E, 3 weeks before surgery.                Plan to be at your goal weight for this visit.  This can be done the same day you meet the surgeon if you are at your goal weight.  ____Pre-assessment Clinic, to be done 3 weeks before surgery. Meet with the anesthesia team. Your pre-operative history and physical can be done at this visit.      DISCLAIMER: Based on the evaluation results, the bariatric team decides whether and which bariatric surgery is the best option for you. Sometimes, even if you meet all of the pre-operative criteria, the bariatric team may still determine that in their medical judgement surgery is not recommended because of the risks to you.      CONTACT INFORMATION  _____If questions prior to surgery, call ANGELO Fulton at [phone].    _____If questions about insurance or scheduling surgery, call Meño at [phone].  _____If questions after surgery and to  schedule 1E clinic appts, please call our  Call Center at [phone].  _____Fax: [phone] (preoperative documents, FMLA or return to work  forms).

## 2017-04-12 ENCOUNTER — OFFICE VISIT (OUTPATIENT)
Dept: ORTHOPEDICS | Facility: OTHER | Age: 47
End: 2017-04-12
Attending: ORTHOPAEDIC SURGERY
Payer: OTHER MISCELLANEOUS

## 2017-04-12 ENCOUNTER — TELEPHONE (OUTPATIENT)
Dept: ORTHOPEDICS | Facility: OTHER | Age: 47
End: 2017-04-12

## 2017-04-12 VITALS
RESPIRATION RATE: 18 BRPM | WEIGHT: 315 LBS | DIASTOLIC BLOOD PRESSURE: 82 MMHG | HEART RATE: 63 BPM | OXYGEN SATURATION: 95 % | TEMPERATURE: 98 F | HEIGHT: 73 IN | BODY MASS INDEX: 41.75 KG/M2 | SYSTOLIC BLOOD PRESSURE: 116 MMHG

## 2017-04-12 DIAGNOSIS — M23.91 ARTICULAR CARTILAGE DISORDER OF KNEE, RIGHT: Primary | ICD-10-CM

## 2017-04-12 PROCEDURE — 99213 OFFICE O/P EST LOW 20 MIN: CPT | Performed by: ORTHOPAEDIC SURGERY

## 2017-04-12 ASSESSMENT — PAIN SCALES - GENERAL: PAINLEVEL: NO PAIN (0)

## 2017-04-12 NOTE — PROGRESS NOTES
Occupational Visit      Chief complaint:  #1. Work-related articular cartilage injury right knee February 6, 2017   #2. Pre-existing mild medial compartment primary osteoarthritis right knee     Subjective: This 47-year-old right-handed  for [employer] had no problems with his right knee until February 6, 2017 when he was in the course of his employment and fell onto his right side injuring his right knee. The onset of pain was instant. He reported the injury to his employer. The next day he saw Samira Whitney NP who obtained x-rays and an MRI. He presented to me on February 21, 2017. His right knee had an effusion. His range of motion was  . The plain films showed mild joint space narrowing medially indicating pre-existing early DJD. The MRI showed an effusion, and 2 full-thickness articular cartilage defects: 1 in the trochlear groove and the other on the medial femoral condyle. I aspirated the knee and referred him to physical therapy. The commencement of physical therapy somehow was delayed, so at his follow-up visit on 3/8/17, he had only had 3 sessions.  He is still out of work.      Today he reports that his knee is 85% improved compared with when he began therapy.  His therapy report documents improvement.  The therapist recommends two more therapy sessions over the next 2 weeks followed by a home exercise program.  In addition, the patient feels ready to return to work.    Nothing has changed with respect to his musculoskeletal or neurologic review of systems since seen last.    Examination: Overweight but otherwise healthy-appearing male with appropriate mood and affect.  Stance and gait are normal.  The skin around the right knee shows no erythema or ecchymosis.  The right knee has no deformity or effusion.  Its range of motion is full.  Extension strength is normal.  The knee is nontender to palpation.  The neurovascular status of that limb is intact.    Impression: Significant  improvement.    Plan: He will complete his last 2 therapy sessions, then do his home exercise program.  He'll be allowed to return to work doing normal duties on 4/17/17.  He'll return here as needed.

## 2017-04-12 NOTE — NURSING NOTE
"Chief Complaint   Patient presents with     Musculoskeletal Problem     Right knee pain        Initial /82 (BP Location: Left arm, Patient Position: Chair, Cuff Size: Adult Large)  Pulse 63  Temp 98  F (36.7  C) (Tympanic)  Resp 18  Ht 6' 0.5\" (1.842 m)  Wt (!) 328 lb (148.8 kg)  SpO2 95%  BMI 43.87 kg/m2 Estimated body mass index is 43.87 kg/(m^2) as calculated from the following:    Height as of this encounter: 6' 0.5\" (1.842 m).    Weight as of this encounter: 328 lb (148.8 kg).  Medication Reconciliation: unable or not appropriate to perform   Maddie Soria LPN      "

## 2017-04-12 NOTE — TELEPHONE ENCOUNTER
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  Routed to [facility].

## 2017-04-12 NOTE — MR AVS SNAPSHOT
After Visit Summary   4/12/2017    Jason Duncan    MRN: [id number]           Patient Information     Date Of Birth          1970        Visit Information        Provider Department      4/12/2017 8:20 AM Juan Ramon Dickey MD  ORTHOPEDICS        Today's Diagnoses     Articular cartilage disorder of knee, right    -  1       Follow-ups after your visit        Additional Services     PHYSICAL THERAPY REFERRAL       *This therapy referral will be filtered to a centralized scheduling office at [facility] and the patient will receive a call to schedule an appointment at a [facility] location most convenient for them. *     [facility] provides Physical Therapy evaluation and treatment and many specialty services across the [facility] system.  If requesting a specialty program, please choose from the list below.    If you have not heard from the scheduling office within 2 business days, please call [phone] for all locations, with the exception of [place], please call [phone].  Treatment: Evaluation & Treatment  Special Instructions/Modalities: Continue right knee rehab for 2 more visits  Special Programs:     Please be aware that coverage of these services is subject to the terms and limitations of your health insurance plan.  Call member services at your health plan with any benefit or coverage questions.      **Note to Provider:  If you are referring outside of [facility] for the therapy appointment, please list the name of the location in the  special instructions  above, print the referral and give to the patient to schedule the appointment.                  Follow-up notes from your care team     Return if symptoms worsen or fail to improve.      Your next 10 appointments already scheduled     Apr 20, 2017  8:40 AM CDT   (Arrive by 8:25 AM)   Pre Bariatric Surgery with Carlton Kwon MD   [facility] ([facility]  "[facility])    [address]  4th Floor  [facility] [id number] [phone]            Jul 11, 2017  8:30 AM CDT   (Arrive by 8:15 AM)   New Visit with JEROME Jama MD   [facility])    [address] [id number] [phone]              Who to contact     If you have questions or need follow up information about today's clinic visit or your schedule please contact  ORTHOPEDICS directly at [phone].  Normal or non-critical lab and imaging results will be communicated to you by MyChart, letter or phone within 4 business days after the clinic has received the results. If you do not hear from us within 7 days, please contact the clinic through [employer] or phone. If you have a critical or abnormal lab result, we will notify you by phone as soon as possible.  Submit refill requests through [employer] or call your pharmacy and they will forward the refill request to us. Please allow 3 business days for your refill to be completed.          Additional Information About Your Visit        MyChart Information     [employer] gives you secure access to your electronic health record. If you see a primary care provider, you can also send messages to your care team and make appointments. If you have questions, please call your primary care clinic.  If you do not have a primary care provider, please call [phone] and they will assist you.        Care EveryWhere ID     This is your Care EveryWhere ID. This could be used by other organizations to access your [facility] medical records  [id number]        Your Vitals Were     Pulse Temperature Respirations Height Pulse Oximetry BMI (Body Mass Index)    63 98  F (36.7  C) (Tympanic) 18 6' 0.5\" (1.842 m) 95% 43.87 kg/m2       Blood Pressure from Last 3 Encounters:   04/12/17 116/82   03/08/17 114/70   02/21/17 120/70    Weight from Last 3 Encounters:   04/12/17 (!) 328 lb (148.8 kg)   03/08/17 (!) 325 lb " (147.4 kg)   02/21/17 (!) 324 lb (147 kg)              We Performed the Following     PHYSICAL THERAPY REFERRAL          Today's Medication Changes          These changes are accurate as of: 4/12/17 12:56 PM.  If you have any questions, ask your nurse or doctor.               Stop taking these medicines if you haven't already. Please contact your care team if you have questions.     augmented betamethasone dipropionate 0.05 % ointment   Commonly known as:  DIPROLENE-AF   Stopped by:  Juan Ramon Dickey MD           triamcinolone 0.1 % cream   Commonly known as:  KENALOG   Stopped by:  Juan Ramon Dickey MD                    Primary Care Provider Office Phone # Fax #    Samira Whitney [phone]       [address] [address]        Thank you!     Thank you for choosing  ORTHOPEDICS  for your care. Our goal is always to provide you with excellent care. Hearing back from our patients is one way we can continue to improve our services. Please take a few minutes to complete the written survey that you may receive in the mail after your visit with us. Thank you!             Your Updated Medication List - Protect others around you: Learn how to safely use, store and throw away your medicines at www.disposemymeds.org.          This list is accurate as of: 4/12/17 12:56 PM.  Always use your most recent med list.                   Brand Name Dispense Instructions for use    amLODIPine 10 MG tablet    NORVASC    90 tablet    TAKE 1 TABLET BY MOUTH DAILY       aspirin 81 MG tablet      Take by mouth daily       cetirizine 10 MG tablet    zyrTEC    30 tablet    TAKE 1 TABLET BY MOUTH EVERY EVENING       CLEAR-ATADINE 10 MG tablet   Generic drug:  loratadine     30 tablet    TAKE 1 TABLET BY MOUTH DAILY       cyclobenzaprine 10 MG tablet    FLEXERIL    90 tablet    TAKE 1 TABLET BY MOUTH NIGHTLY AS NEEDED FOR MUSCLE SPASMS       lisinopril 10 MG tablet    PRINIVIL/ZESTRIL    90  tablet    TAKE 1 TABLET BY MOUTH DAILY       Multi-vitamin Tabs tablet      Take 1 tablet by mouth daily       VITAMIN D (CHOLECALCIFEROL) PO      Take 4,000 Units by mouth daily

## 2017-04-12 NOTE — LETTER
ORTHOPEDICS  [address] [place] [id number]  Phone: [phone]    April 12, 2017        Jason Duncan  [address] [address]          To whom it may concern:    RE: Jason Duncan    Patient may return to work and [employer] Department on 4/17/2017 with the following:  No restrictions.    Please contact me for questions or concerns.      Sincerely,        Juan Ramon Dickey MD

## 2017-04-18 DIAGNOSIS — E66.01 MORBID OBESITY, UNSPECIFIED OBESITY TYPE (H): Primary | ICD-10-CM

## 2017-05-01 ENCOUNTER — TELEPHONE (OUTPATIENT)
Dept: SURGERY | Facility: CLINIC | Age: 47
End: 2017-05-01

## 2017-05-01 NOTE — TELEPHONE ENCOUNTER
"----- Message from Meño Yee sent at 5/1/2017 10:41 AM CDT -----  Regarding: spoke with pt  I spoke with Jason. He said his work is really picking up and \"I can't take 2 weeks off\" so he would like to do the surgery in either September or October with Dr. Kwon.    "

## 2017-05-17 DIAGNOSIS — R25.2 CRAMP OF LIMB: ICD-10-CM

## 2017-05-17 RX ORDER — CYCLOBENZAPRINE HCL 10 MG
TABLET ORAL
Qty: 90 TABLET | Refills: 0 | Status: SHIPPED | OUTPATIENT
Start: 2017-05-17 | End: 2017-08-30

## 2017-05-17 NOTE — TELEPHONE ENCOUNTER
Flexeril      Last Written Prescription Date: 11/7/16  Last Fill Quantity: 90,  # refills: 0   Last Office Visit with G, P or [facility] prescribing provider: 2/7/17

## 2017-05-30 ENCOUNTER — TELEPHONE (OUTPATIENT)
Dept: FAMILY MEDICINE | Facility: OTHER | Age: 47
End: 2017-05-30

## 2017-05-30 DIAGNOSIS — G47.30 SLEEP APNEA, UNSPECIFIED TYPE: Primary | ICD-10-CM

## 2017-05-30 NOTE — TELEPHONE ENCOUNTER
12:57 PM    Reason for Call: Phone Call    Description: PT called and wanted to talk to the nurse about his breathing machine for at night time, he is wondering if he will need a new one ordered or not. Please call him back at [phone]    Was an appointment offered for this call? Yes    Preferred method for responding to this message: Telephone Call [phone]    If we cannot reach you directly, may we leave a detailed response at the number you provided? Yes    Can this message wait until your PCP/provider returns, if available today? N/A    Bianca Weston

## 2017-06-21 DIAGNOSIS — G47.30 SLEEP APNEA, UNSPECIFIED TYPE: Primary | ICD-10-CM

## 2017-06-26 ENCOUNTER — MEDICAL CORRESPONDENCE (OUTPATIENT)
Dept: HEALTH INFORMATION MANAGEMENT | Facility: HOSPITAL | Age: 47
End: 2017-06-26

## 2017-08-30 DIAGNOSIS — R25.2 CRAMP OF LIMB: ICD-10-CM

## 2017-08-30 RX ORDER — CYCLOBENZAPRINE HCL 10 MG
TABLET ORAL
Qty: 90 TABLET | Refills: 0 | Status: SHIPPED | OUTPATIENT
Start: 2017-08-30 | End: 2018-01-08

## 2017-10-27 DIAGNOSIS — I10 BENIGN ESSENTIAL HYPERTENSION: ICD-10-CM

## 2017-10-30 ENCOUNTER — OFFICE VISIT (OUTPATIENT)
Dept: FAMILY MEDICINE | Facility: OTHER | Age: 47
End: 2017-10-30
Attending: NURSE PRACTITIONER
Payer: COMMERCIAL

## 2017-10-30 VITALS
SYSTOLIC BLOOD PRESSURE: 120 MMHG | TEMPERATURE: 98 F | HEART RATE: 92 BPM | RESPIRATION RATE: 14 BRPM | DIASTOLIC BLOOD PRESSURE: 80 MMHG | HEIGHT: 75 IN | BODY MASS INDEX: 31.46 KG/M2 | WEIGHT: 253 LBS

## 2017-10-30 DIAGNOSIS — M10.071 ACUTE IDIOPATHIC GOUT INVOLVING TOE OF RIGHT FOOT: Primary | ICD-10-CM

## 2017-10-30 LAB
BASOPHILS # BLD AUTO: 0.1 10E9/L (ref 0–0.2)
BASOPHILS NFR BLD AUTO: 0.7 %
DIFFERENTIAL METHOD BLD: NORMAL
EOSINOPHIL # BLD AUTO: 0.5 10E9/L (ref 0–0.7)
EOSINOPHIL NFR BLD AUTO: 5 %
ERYTHROCYTE [DISTWIDTH] IN BLOOD BY AUTOMATED COUNT: 13.3 % (ref 10–15)
HCT VFR BLD AUTO: 44.8 % (ref 40–53)
HGB BLD-MCNC: 15.8 G/DL (ref 13.3–17.7)
LYMPHOCYTES # BLD AUTO: 1.4 10E9/L (ref 0.8–5.3)
LYMPHOCYTES NFR BLD AUTO: 15.1 %
MCH RBC QN AUTO: 30 PG (ref 26.5–33)
MCHC RBC AUTO-ENTMCNC: 35.3 G/DL (ref 31.5–36.5)
MCV RBC AUTO: 85 FL (ref 78–100)
MONOCYTES # BLD AUTO: 1.1 10E9/L (ref 0–1.3)
MONOCYTES NFR BLD AUTO: 11.7 %
NEUTROPHILS # BLD AUTO: 6.1 10E9/L (ref 1.6–8.3)
NEUTROPHILS NFR BLD AUTO: 67.5 %
PLATELET # BLD AUTO: 318 10E9/L (ref 150–450)
RBC # BLD AUTO: 5.26 10E12/L (ref 4.4–5.9)
WBC # BLD AUTO: 9.1 10E9/L (ref 4–11)

## 2017-10-30 PROCEDURE — 36415 COLL VENOUS BLD VENIPUNCTURE: CPT | Performed by: NURSE PRACTITIONER

## 2017-10-30 PROCEDURE — 99213 OFFICE O/P EST LOW 20 MIN: CPT | Performed by: NURSE PRACTITIONER

## 2017-10-30 PROCEDURE — 85025 COMPLETE CBC W/AUTO DIFF WBC: CPT | Performed by: NURSE PRACTITIONER

## 2017-10-30 PROCEDURE — 84550 ASSAY OF BLOOD/URIC ACID: CPT | Performed by: NURSE PRACTITIONER

## 2017-10-30 RX ORDER — INDOMETHACIN 25 MG/1
CAPSULE ORAL
Qty: 60 CAPSULE | Refills: 1 | Status: SHIPPED | OUTPATIENT
Start: 2017-10-30 | End: 2018-01-10

## 2017-10-30 ASSESSMENT — PATIENT HEALTH QUESTIONNAIRE - PHQ9: SUM OF ALL RESPONSES TO PHQ QUESTIONS 1-9: 0

## 2017-10-30 ASSESSMENT — ANXIETY QUESTIONNAIRES
4. TROUBLE RELAXING: NOT AT ALL
GAD7 TOTAL SCORE: 0
1. FEELING NERVOUS, ANXIOUS, OR ON EDGE: NOT AT ALL
IF YOU CHECKED OFF ANY PROBLEMS ON THIS QUESTIONNAIRE, HOW DIFFICULT HAVE THESE PROBLEMS MADE IT FOR YOU TO DO YOUR WORK, TAKE CARE OF THINGS AT HOME, OR GET ALONG WITH OTHER PEOPLE: NOT DIFFICULT AT ALL
5. BEING SO RESTLESS THAT IT IS HARD TO SIT STILL: NOT AT ALL
6. BECOMING EASILY ANNOYED OR IRRITABLE: NOT AT ALL
3. WORRYING TOO MUCH ABOUT DIFFERENT THINGS: NOT AT ALL
2. NOT BEING ABLE TO STOP OR CONTROL WORRYING: NOT AT ALL
7. FEELING AFRAID AS IF SOMETHING AWFUL MIGHT HAPPEN: NOT AT ALL

## 2017-10-30 NOTE — PATIENT INSTRUCTIONS
ASSESSMENT/PLAN:     1. Acute idiopathic gout involving toe of right foot  - Uric acid  - indomethacin (INDOCIN) 25 MG capsule; 1-2 po BID PRN for pain  Dispense: 60 capsule; Refill: 1  - CBC with platelets differential        This is gout.  Will start some indomethacin (indocin) .  Can stop it a day or 2 after the pain is gone.    Would stay away from alcohol and caffeine while on the indocin.   May want to use some prilosec or zantac (Generic) while on these . Watch for stomach side effects   Will check a uric acid level.   Since this is the first episode would not treat the uric acid unless really elevated.   Would wait for a second attack.   Stop the aspirin. Stop HCTZ.                 Gout   What is gout?   Gout is a disease usually caused by having too much uric acid in your body. Too much uric acid may not cause symptoms for years, but after a time it usually causes painful joint inflammation (arthritis). The most common site of inflammation is the joint between the foot and the big toe. Later attacks often affect other joints of the foot and leg. Less often, the arms and hands are affected.   In addition to the arthritis, gout causes the formation of tophi. Tophi are lumpy deposits of uric acid crystals just under the skin. Common places for tophi to develop are in the outer edge of the ear, on or near the elbow, over the fingers and toes, and around the Achilles tendon in the ankle.   Gout can also cause kidney stones made of uric acid.   Most people who have gout are middle-aged men, but it can occur at any age. Only 5 to 10% of cases of gout occur in women, most often after menopause.   How does it occur?   Gout usually occurs because too much uric acid is in your joints. Uric acid comes from the breakdown of substances called purines. Purines are found in all of your body's tissues. They are also in many foods. Normally, uric acid dissolves in the blood and passes through the kidneys and out of the  body in urine. If the levels of uric acid build up in the blood, sharp uric acid crystals may form in the joints. The crystals cause pain and swelling. You may have too much uric acid in your joints when your kidney does not get rid of enough uric acid or when your body makes too much uric acid.   Most cases of gout are caused by poor elimination of uric acid by the kidneys, but it can be hard to know why this is happening. The specific problem with the kidney is usually never found.   Some people inherit a tendency to make too much uric acid. Others may make too much uric acid because they have a disease such as cancer or certain types of red blood cell disorders. A diet high in alcoholic drinks and purine-rich foods (such as seafood and meat, especially red meat and organ meats) can also cause your body to make too much uric acid.   Uric acid levels in men start to go up after puberty. Women's uric acid levels usually do not go up until after menopause. For this reason women are protected from gout until several years after menopause. The uric acid levels have to be high for many years before gout develops. Men with gout usually have their first attack when they are middle-aged.   Certain conditions, such as dehydration, can cause excess levels of uric acid. Diuretic medicine (also called water pills), which is often used to treat high blood pressure, can increase levels of uric acid. Other medicines can also affect the level of uric acid in the blood. It is important to make sure your healthcare provider knows all the medicines you are using, both prescription and nonprescription.   People who have recently had a serious illness or surgery have an increased chance of having an attack of gout. Some people have gouty arthritis even though they have normal uric acid levels.   What are the symptoms?   Some people have high uric acid blood levels for years and never have any symptoms. Only 10 to 20% of people with  high levels develop symptoms in their joints, such as:     sudden, severe pain, especially of just one joint at a time     redness     swelling.   The sudden attacks are sometimes related to physical illness, trauma, or excessive alcohol use. The symptoms may last for days to weeks. The arthritis usually occurs before tophi or kidney stones develop.   The tophi do not cause any symptoms unless they open and drain. They are often not painful. Depending on their location, they may limit the movement of joints.   The symptoms of uric acid stones are like those of other kidney stones. They can cause severe abdominal pain and sometimes nausea, vomiting, fever, or blood in the urine.   How is it diagnosed?   Your healthcare provider will suspect that you have gout if:     Your first toe joint is inflamed.     You have a blood test that shows a high level of uric acid in your blood.     You are developing tophi.     You start taking the drug colchicine and your symptoms of arthritis improve. (Colchicine, an anti-inflammatory drug, is effective only in gouty-type arthritis.)   To confirm the diagnosis, your provider may take a sample of fluid from the affected joint or joints and send it to the lab for tests. If you have uric acid crystals in the fluid, you have gout.   How is it treated?   Usually, if you have high uric acid levels but no symptoms, you will not need treatment. In special cases (for example, if you have a strong family history of gouty arthritis or kidney stones), you may be treated for gout even though you do not have any symptoms.   If you have symptoms of gout, the goals of treatment are:     Stop the pain of gouty arthritis or kidney stones.     Try to prevent the recurrence of these problems by controlling the uric acid levels.     Prevent serious complications such as kidney damage.   Treatment of the arthritis first involves the use of anti-inflammatory medicines, such as:     indomethacin      ibuprofen or naproxen     corticosteroid drugs, such as prednisone     colchicine.   Aspirin is not usually recommended because it may keep the urine from taking the uric acid out of the body.   Anti-inflammatory medicines are sometimes taken daily to prevent recurrent attacks of gouty arthritis. If the gouty arthritis becomes a frequent problem, allopurinol and probenecid may also be prescribed to prevent damaging deposits of uric acid in the joints.   How long will the effects last?   The sooner treatment is started, the sooner the symptoms stop, which may be within 24 to 48 hours. If gout is not treated, it could last a few days to several weeks. A second attack may occur, but usually not for 6 months to 2 years. In other cases another attack may not occur until many years later, or never.   How can I help prevent gout?   There is no sure way to prevent gout. However, you can take these steps to lessen the chance that you will have high uric acid levels:     Eat a diet low in purines. Purine-containing foods that you should avoid include meat--especially red meat and organ meats (such as sweetbreads, liver, and kidney)--shrimp, anchovies, sardines, and dried legumes (beans).     Do not overindulge in alcohol. Do not drink more than 2 ounces a day.     Drink lots of fluids.                Samira Whitney NP  [facility]

## 2017-10-30 NOTE — TELEPHONE ENCOUNTER
lisinopril (PRINIVIL/ZESTRIL) 10 MG tablet    Last Written Prescription Date: 11/7/16  Last Fill Quantity: 90,  # refills: 0   Last Office Visit with [facility], [facility] or [facility] prescribing provider: 10/30/17

## 2017-10-30 NOTE — MR AVS SNAPSHOT
After Visit Summary   10/30/2017    Jason Duncan    MRN: [id number]           Patient Information     Date Of Birth          1970        Visit Information        Provider Department      10/30/2017 2:30 PM Samira Whitney NP [facility]        Today's Diagnoses     Acute idiopathic gout involving toe of right foot    -  1      Care Instructions        ASSESSMENT/PLAN:     1. Acute idiopathic gout involving toe of right foot  - Uric acid  - indomethacin (INDOCIN) 25 MG capsule; 1-2 po BID PRN for pain  Dispense: 60 capsule; Refill: 1  - CBC with platelets differential        This is gout.  Will start some indomethacin (indocin) .  Can stop it a day or 2 after the pain is gone.    Would stay away from alcohol and caffeine while on the indocin.   May want to use some prilosec or zantac (Generic) while on these . Watch for stomach side effects   Will check a uric acid level.   Since this is the first episode would not treat the uric acid unless really elevated.   Would wait for a second attack.   Stop the aspirin. Stop HCTZ.                 Gout   What is gout?   Gout is a disease usually caused by having too much uric acid in your body. Too much uric acid may not cause symptoms for years, but after a time it usually causes painful joint inflammation (arthritis). The most common site of inflammation is the joint between the foot and the big toe. Later attacks often affect other joints of the foot and leg. Less often, the arms and hands are affected.   In addition to the arthritis, gout causes the formation of tophi. Tophi are lumpy deposits of uric acid crystals just under the skin. Common places for tophi to develop are in the outer edge of the ear, on or near the elbow, over the fingers and toes, and around the Achilles tendon in the ankle.   Gout can also cause kidney stones made of uric acid.   Most people who have gout are middle-aged men, but it can occur at any age. Only 5 to 10% of  cases of gout occur in women, most often after menopause.   How does it occur?   Gout usually occurs because too much uric acid is in your joints. Uric acid comes from the breakdown of substances called purines. Purines are found in all of your body's tissues. They are also in many foods. Normally, uric acid dissolves in the blood and passes through the kidneys and out of the body in urine. If the levels of uric acid build up in the blood, sharp uric acid crystals may form in the joints. The crystals cause pain and swelling. You may have too much uric acid in your joints when your kidney does not get rid of enough uric acid or when your body makes too much uric acid.   Most cases of gout are caused by poor elimination of uric acid by the kidneys, but it can be hard to know why this is happening. The specific problem with the kidney is usually never found.   Some people inherit a tendency to make too much uric acid. Others may make too much uric acid because they have a disease such as cancer or certain types of red blood cell disorders. A diet high in alcoholic drinks and purine-rich foods (such as seafood and meat, especially red meat and organ meats) can also cause your body to make too much uric acid.   Uric acid levels in men start to go up after puberty. Women's uric acid levels usually do not go up until after menopause. For this reason women are protected from gout until several years after menopause. The uric acid levels have to be high for many years before gout develops. Men with gout usually have their first attack when they are middle-aged.   Certain conditions, such as dehydration, can cause excess levels of uric acid. Diuretic medicine (also called water pills), which is often used to treat high blood pressure, can increase levels of uric acid. Other medicines can also affect the level of uric acid in the blood. It is important to make sure your healthcare provider knows all the medicines you are  using, both prescription and nonprescription.   People who have recently had a serious illness or surgery have an increased chance of having an attack of gout. Some people have gouty arthritis even though they have normal uric acid levels.   What are the symptoms?   Some people have high uric acid blood levels for years and never have any symptoms. Only 10 to 20% of people with high levels develop symptoms in their joints, such as:     sudden, severe pain, especially of just one joint at a time     redness     swelling.   The sudden attacks are sometimes related to physical illness, trauma, or excessive alcohol use. The symptoms may last for days to weeks. The arthritis usually occurs before tophi or kidney stones develop.   The tophi do not cause any symptoms unless they open and drain. They are often not painful. Depending on their location, they may limit the movement of joints.   The symptoms of uric acid stones are like those of other kidney stones. They can cause severe abdominal pain and sometimes nausea, vomiting, fever, or blood in the urine.   How is it diagnosed?   Your healthcare provider will suspect that you have gout if:     Your first toe joint is inflamed.     You have a blood test that shows a high level of uric acid in your blood.     You are developing tophi.     You start taking the drug colchicine and your symptoms of arthritis improve. (Colchicine, an anti-inflammatory drug, is effective only in gouty-type arthritis.)   To confirm the diagnosis, your provider may take a sample of fluid from the affected joint or joints and send it to the lab for tests. If you have uric acid crystals in the fluid, you have gout.   How is it treated?   Usually, if you have high uric acid levels but no symptoms, you will not need treatment. In special cases (for example, if you have a strong family history of gouty arthritis or kidney stones), you may be treated for gout even though you do not have any symptoms.    If you have symptoms of gout, the goals of treatment are:     Stop the pain of gouty arthritis or kidney stones.     Try to prevent the recurrence of these problems by controlling the uric acid levels.     Prevent serious complications such as kidney damage.   Treatment of the arthritis first involves the use of anti-inflammatory medicines, such as:     indomethacin     ibuprofen or naproxen     corticosteroid drugs, such as prednisone     colchicine.   Aspirin is not usually recommended because it may keep the urine from taking the uric acid out of the body.   Anti-inflammatory medicines are sometimes taken daily to prevent recurrent attacks of gouty arthritis. If the gouty arthritis becomes a frequent problem, allopurinol and probenecid may also be prescribed to prevent damaging deposits of uric acid in the joints.   How long will the effects last?   The sooner treatment is started, the sooner the symptoms stop, which may be within 24 to 48 hours. If gout is not treated, it could last a few days to several weeks. A second attack may occur, but usually not for 6 months to 2 years. In other cases another attack may not occur until many years later, or never.   How can I help prevent gout?   There is no sure way to prevent gout. However, you can take these steps to lessen the chance that you will have high uric acid levels:     Eat a diet low in purines. Purine-containing foods that you should avoid include meat--especially red meat and organ meats (such as sweetbreads, liver, and kidney)--shrimp, anchovies, sardines, and dried legumes (beans).     Do not overindulge in alcohol. Do not drink more than 2 ounces a day.     Drink lots of fluids.                Samira Whitney NP  [facility]          Follow-ups after your visit        Who to contact     If you have questions or need follow up information about today's clinic visit or your schedule please contact [facility] directly at  "[phone].  Normal or non-critical lab and imaging results will be communicated to you by MyChart, letter or phone within 4 business days after the clinic has received the results. If you do not hear from us within 7 days, please contact the clinic through [facility] or phone. If you have a critical or abnormal lab result, we will notify you by phone as soon as possible.  Submit refill requests through [facility] [facility] or call your pharmacy and they will forward the refill request to us. Please allow 3 business days for your refill to be completed.          Additional Information About Your Visit        [facility] Information     [facility] gives you secure access to your electronic health record. If you see a primary care provider, you can also send messages to your care team and make appointments. If you have questions, please call your primary care clinic.  If you do not have a primary care provider, please call [phone] and they will assist you.        Care EveryWhere ID     This is your Care EveryWhere ID. This could be used by other organizations to access your [place] medical records  [id number]        Your Vitals Were     Pulse Temperature Respirations Height BMI (Body Mass Index)       92 98  F (36.7  C) 14 6' 3\" (1.905 m) 31.62 kg/m2        Blood Pressure from Last 3 Encounters:   10/30/17 120/80   04/12/17 116/82   03/08/17 114/70    Weight from Last 3 Encounters:   10/30/17 253 lb (114.8 kg)   04/12/17 (!) 328 lb (148.8 kg)   03/08/17 (!) 325 lb (147.4 kg)              We Performed the Following     CBC with platelets differential     Uric acid          Today's Medication Changes          These changes are accurate as of: 10/30/17  2:57 PM.  If you have any questions, ask your nurse or doctor.               Start taking these medicines.        Dose/Directions    indomethacin 25 MG capsule   Commonly known as:  INDOCIN   Used for:  Acute idiopathic gout involving toe of right foot   Started by:  Samira Whitney, ELAN "        1-2 po BID PRN for pain   Quantity:  60 capsule   Refills:  1            Where to get your medicines      These medications were sent to [facility] - [place], [place] - [address]  [address] [place] [address]     Phone:  [phone]     indomethacin 25 MG capsule                Primary Care Provider Office Phone # Fax #    Samira Whitney [phone]       [facility] [address] [address]        Equal Access to Services     AWAIS RODRÍGUEZ : Hadii aad ku hadasho Soomaali, waaxda luqadaha, qaybta kaalmada adeegyada, waxay idiin hayaan adearmida manzo . So [facility] [phone].    ATENCIÓN: Si habla español, tiene a duenas disposición servicios gratuitos de asistencia lingüística. [facility] [phone].    We comply with applicable federal civil rights laws and [place] laws. We do not discriminate on the basis of race, color, national origin, age, disability, sex, sexual orientation, or gender identity.            Thank you!     Thank you for choosing [facility]  for your care. Our goal is always to provide you with excellent care. Hearing back from our patients is one way we can continue to improve our services. Please take a few minutes to complete the written survey that you may receive in the mail after your visit with us. Thank you!             Your Updated Medication List - Protect others around you: Learn how to safely use, store and throw away your medicines at www.disposemymeds.org.          This list is accurate as of: 10/30/17  2:57 PM.  Always use your most recent med list.                   Brand Name Dispense Instructions for use Diagnosis    amLODIPine 10 MG tablet    NORVASC    90 tablet    TAKE 1 TABLET BY MOUTH DAILY    Essential hypertension       aspirin 81 MG tablet      Take by mouth daily        cetirizine 10 MG tablet    zyrTEC    30 tablet    TAKE 1 TABLET BY MOUTH EVERY EVENING    Rash and nonspecific skin eruption        CLEAR-ATADINE 10 MG tablet   Generic drug:  loratadine     30 tablet    TAKE 1 TABLET BY MOUTH DAILY    Allergic reaction, initial encounter       cyclobenzaprine 10 MG tablet    FLEXERIL    90 tablet    TAKE 1 TABLET BY MOUTH NIGHTLY AS NEEDED FOR MUSCLE SPASMS    Cramp of limb       indomethacin 25 MG capsule    INDOCIN    60 capsule    1-2 po BID PRN for pain    Acute idiopathic gout involving toe of right foot       lisinopril 10 MG tablet    PRINIVIL/ZESTRIL    90 tablet    TAKE 1 TABLET BY MOUTH DAILY    Benign essential hypertension       Multi-vitamin Tabs tablet      Take 1 tablet by mouth daily        * order for DME     1 Device    C-PAP mask   DX: Sleep apnea    Sleep apnea, unspecified type       * order for DME     1 Device    c-pap mask and supplies  DX: G47.33, sleep apnea    Sleep apnea, unspecified type       VITAMIN D (CHOLECALCIFEROL) PO      Take 4,000 Units by mouth daily        * Notice:  This list has 2 medication(s) that are the same as other medications prescribed for you. Read the directions carefully, and ask your doctor or other care provider to review them with you.

## 2017-10-30 NOTE — PROGRESS NOTES
SUBJECTIVE:   Jason Duncan is a 47 year old male who presents to clinic today for the following health issues:      Gout/ single inflamed joint   Onset: 5 days ago    Description:   Location: big toe - right  Joint Swelling: YES  Redness: YES  Pain: YES    Intensity: moderate, severe when bumps,     Progression of Symptoms:  same and constant    Accompanying Signs & Symptoms:  Fevers: no     History:   Trauma to the area: no   Previous history of gout: no    Recent illness:  no     Precipitating factors:   Diet-rich in purine: no  Alcohol use: YES, seldom  Diuretic use: no     Alleviating factors:  None      Therapies Tried and outcome: none          Problem list and histories reviewed & adjusted, as indicated.  Additional history: as documented    Patient Active Problem List   Diagnosis     Benign essential hypertension     Mixed hearing loss, unilateral     ACP (advance care planning)     Morbid obesity due to excess calories (H)     RACHELE (obstructive sleep apnea)     Past Surgical History:   Procedure Laterality Date     GASTRIC RESTRICTIVE PROCEDURE, OPEN, REMOVE/REPLACE SUBCUTANEOUS PORT  2008     GI SURGERY  2015    removal of lap band      lap band  2008     nasal septoplasty       SEPTOPLASTY      Nasal       Social History   Substance Use Topics     Smoking status: Never Smoker     Smokeless tobacco: Never Used     Alcohol use Yes      Comment: rarely     Family History   Problem Relation Age of Onset     Dementia Mother 72     Cause of death     DIABETES Father      Respiratory Father      COPD     Cardiovascular Father      CHF         Current Outpatient Prescriptions   Medication Sig Dispense Refill     indomethacin (INDOCIN) 25 MG capsule 1-2 po BID PRN for pain 60 capsule 1     cyclobenzaprine (FLEXERIL) 10 MG tablet TAKE 1 TABLET BY MOUTH NIGHTLY AS NEEDED FOR MUSCLE SPASMS 90 tablet 0     order for DME c-pap mask and supplies    DX: G47.33, sleep apnea 1 Device 11     order for DME C-PAP  "mask      DX: Sleep apnea 1 Device 3     CLEAR-ATADINE 10 MG tablet TAKE 1 TABLET BY MOUTH DAILY 30 tablet 10     amLODIPine (NORVASC) 10 MG tablet TAKE 1 TABLET BY MOUTH DAILY 90 tablet 3     VITAMIN D, CHOLECALCIFEROL, PO Take 4,000 Units by mouth daily       lisinopril (PRINIVIL,ZESTRIL) 10 MG tablet TAKE 1 TABLET BY MOUTH DAILY 90 tablet 3     cetirizine (ZYRTEC) 10 MG tablet TAKE 1 TABLET BY MOUTH EVERY EVENING 30 tablet 3     multivitamin, therapeutic with minerals (MULTI-VITAMIN) TABS Take 1 tablet by mouth daily       aspirin 81 MG tablet Take by mouth daily       No Known Allergies  Recent Labs   Lab Test  01/10/17   1043  10/21/15   0925   12/10/13   0810   A1C   --    --    --   5.5   LDL   --   105   --   117   HDL   --   51   --   48   TRIG   --   147   --   85   ALT  83*  54   --   26   CR  1.05  0.96   < >  1.19   GFRESTIMATED  76  85   < >  67   GFRESTBLACK  >90   GFR Calc    >90   GFR Calc     < >  82   POTASSIUM  3.6  4.3   < >  3.8    < > = values in this interval not displayed.      BP Readings from Last 3 Encounters:   10/30/17 120/80   04/12/17 116/82   03/08/17 114/70    Wt Readings from Last 3 Encounters:   10/30/17 253 lb (114.8 kg)   04/12/17 (!) 328 lb (148.8 kg)   03/08/17 (!) 325 lb (147.4 kg)                  Labs reviewed in EPIC          Reviewed and updated as needed this visit by clinical staff       Reviewed and updated as needed this visit by Provider         ROS:  Constitutional, HEENT, cardiovascular, pulmonary, gi and gu systems are negative, except as otherwise noted.      OBJECTIVE:   /80 (BP Location: Left arm, Patient Position: Sitting, Cuff Size: Adult Large)  Pulse 92  Temp 98  F (36.7  C)  Resp 14  Ht 6' 3\" (1.905 m)  Wt 253 lb (114.8 kg)  BMI 31.62 kg/m2  Body mass index is 31.62 kg/(m^2).     GENERAL: healthy, alert and no distress  NECK: no adenopathy, no asymmetry, masses, or scars and thyroid normal to palpation  RESP: " lungs clear to auscultation - no rales, rhonchi or wheezes  CV: regular rate and rhythm, normal S1 S2, no S3 or S4, no murmur, click or rub, no peripheral edema and peripheral pulses strong  MS: right great toe - erythema - tenderness, mild swelling  SKIN: right great toe erythema        ASSESSMENT/PLAN:     1. Acute idiopathic gout involving toe of right foot  - Uric acid  - indomethacin (INDOCIN) 25 MG capsule; 1-2 po BID PRN for pain  Dispense: 60 capsule; Refill: 1  - CBC with platelets differential        Samira Whitney NP  [facility]

## 2017-10-30 NOTE — NURSING NOTE
"Chief Complaint   Patient presents with     Toe Pain       Initial /80 (BP Location: Left arm, Patient Position: Sitting, Cuff Size: Adult Large)  Pulse 92  Temp 98  F (36.7  C)  Resp 14  Ht 6' 3\" (1.905 m)  Wt 253 lb (114.8 kg)  BMI 31.62 kg/m2 Estimated body mass index is 31.62 kg/(m^2) as calculated from the following:    Height as of this encounter: 6' 3\" (1.905 m).    Weight as of this encounter: 253 lb (114.8 kg).  Medication Reconciliation: complete       Lexis Rico      "

## 2017-10-31 LAB — URATE SERPL-MCNC: 6 MG/DL (ref 3.5–7.2)

## 2017-10-31 RX ORDER — LISINOPRIL 10 MG/1
TABLET ORAL
Qty: 90 TABLET | Refills: 1 | Status: SHIPPED | OUTPATIENT
Start: 2017-10-31 | End: 2018-04-17

## 2017-10-31 ASSESSMENT — ANXIETY QUESTIONNAIRES: GAD7 TOTAL SCORE: 0

## 2017-10-31 NOTE — PROGRESS NOTES
Normal labs - exam consistent with gout.  Indocin was prescribed    Samira Whitney [facility]  [phone]

## 2017-12-11 ENCOUNTER — TELEPHONE (OUTPATIENT)
Dept: SURGERY | Facility: CLINIC | Age: 47
End: 2017-12-11

## 2017-12-27 DIAGNOSIS — I10 ESSENTIAL HYPERTENSION: ICD-10-CM

## 2017-12-27 NOTE — TELEPHONE ENCOUNTER
norvasc   Last Written Prescription Date: 2/21/17  Last Fill Quantity: 90,  # refills: 3   Last Office Visit with G, P or [facility] prescribing provider: 10/31/17

## 2017-12-28 RX ORDER — AMLODIPINE BESYLATE 10 MG/1
TABLET ORAL
Qty: 90 TABLET | Refills: 2 | Status: SHIPPED | OUTPATIENT
Start: 2017-12-28 | End: 2018-04-17

## 2018-01-08 DIAGNOSIS — R25.2 CRAMP OF LIMB: ICD-10-CM

## 2018-01-08 RX ORDER — CYCLOBENZAPRINE HCL 10 MG
TABLET ORAL
Qty: 90 TABLET | Refills: 0 | Status: SHIPPED | OUTPATIENT
Start: 2018-01-08 | End: 2018-04-17

## 2018-01-08 NOTE — TELEPHONE ENCOUNTER
Flexeril      Last Written Prescription Date:  08/30/17  Last Fill Quantity: 90,   # refills: 0  Last Office Visit: 10/30/17  Future Office visit:   none    Routing refill request to provider for review/approval because:  Drug not on the [facility], [facility] or [facility] refill protocol or controlled substance

## 2018-01-10 DIAGNOSIS — M10.071 ACUTE IDIOPATHIC GOUT INVOLVING TOE OF RIGHT FOOT: ICD-10-CM

## 2018-01-11 NOTE — TELEPHONE ENCOUNTER
indocin      Last Written Prescription Date:  10/30/17  Last Fill Quantity: 60,   # refills: 1  Last Office Visit: 10/31/17  Future Office visit:   none

## 2018-01-12 RX ORDER — INDOMETHACIN 25 MG/1
CAPSULE ORAL
Qty: 60 CAPSULE | Refills: 0 | Status: SHIPPED | OUTPATIENT
Start: 2018-01-12 | End: 2018-04-17

## 2018-03-22 DIAGNOSIS — T78.40XA ALLERGIC REACTION, INITIAL ENCOUNTER: ICD-10-CM

## 2018-03-22 RX ORDER — LORATADINE 10 MG/1
TABLET ORAL
Qty: 30 TABLET | Refills: 0 | Status: SHIPPED | OUTPATIENT
Start: 2018-03-22 | End: 2018-04-17

## 2018-04-17 ENCOUNTER — OFFICE VISIT (OUTPATIENT)
Dept: FAMILY MEDICINE | Facility: OTHER | Age: 48
End: 2018-04-17
Attending: NURSE PRACTITIONER
Payer: COMMERCIAL

## 2018-04-17 VITALS
WEIGHT: 315 LBS | HEART RATE: 90 BPM | SYSTOLIC BLOOD PRESSURE: 128 MMHG | DIASTOLIC BLOOD PRESSURE: 78 MMHG | TEMPERATURE: 96.7 F | OXYGEN SATURATION: 95 % | RESPIRATION RATE: 18 BRPM | HEIGHT: 75 IN | BODY MASS INDEX: 39.17 KG/M2

## 2018-04-17 DIAGNOSIS — G47.33 OSA (OBSTRUCTIVE SLEEP APNEA): ICD-10-CM

## 2018-04-17 DIAGNOSIS — E55.9 VITAMIN D DEFICIENCY: ICD-10-CM

## 2018-04-17 DIAGNOSIS — I10 BENIGN ESSENTIAL HYPERTENSION: ICD-10-CM

## 2018-04-17 DIAGNOSIS — R73.09 ELEVATED GLUCOSE: ICD-10-CM

## 2018-04-17 DIAGNOSIS — R25.2 CRAMP OF LIMB: ICD-10-CM

## 2018-04-17 DIAGNOSIS — M76.62 LEFT ACHILLES TENDINITIS: Primary | ICD-10-CM

## 2018-04-17 DIAGNOSIS — J30.2 CHRONIC SEASONAL ALLERGIC RHINITIS, UNSPECIFIED TRIGGER: ICD-10-CM

## 2018-04-17 LAB
ANION GAP SERPL CALCULATED.3IONS-SCNC: 9 MMOL/L (ref 3–14)
BUN SERPL-MCNC: 11 MG/DL (ref 7–30)
CALCIUM SERPL-MCNC: 9 MG/DL (ref 8.5–10.1)
CHLORIDE SERPL-SCNC: 105 MMOL/L (ref 94–109)
CO2 SERPL-SCNC: 25 MMOL/L (ref 20–32)
CREAT SERPL-MCNC: 1.04 MG/DL (ref 0.66–1.25)
GFR SERPL CREATININE-BSD FRML MDRD: 77 ML/MIN/1.7M2
GLUCOSE SERPL-MCNC: 164 MG/DL (ref 70–99)
POTASSIUM SERPL-SCNC: 3.9 MMOL/L (ref 3.4–5.3)
SODIUM SERPL-SCNC: 139 MMOL/L (ref 133–144)
TSH SERPL DL<=0.005 MIU/L-ACNC: 1.66 MU/L (ref 0.4–4)

## 2018-04-17 PROCEDURE — 82306 VITAMIN D 25 HYDROXY: CPT | Mod: 90 | Performed by: NURSE PRACTITIONER

## 2018-04-17 PROCEDURE — 99214 OFFICE O/P EST MOD 30 MIN: CPT | Performed by: NURSE PRACTITIONER

## 2018-04-17 PROCEDURE — 36415 COLL VENOUS BLD VENIPUNCTURE: CPT | Performed by: NURSE PRACTITIONER

## 2018-04-17 PROCEDURE — 80048 BASIC METABOLIC PNL TOTAL CA: CPT | Performed by: NURSE PRACTITIONER

## 2018-04-17 PROCEDURE — 84443 ASSAY THYROID STIM HORMONE: CPT | Performed by: NURSE PRACTITIONER

## 2018-04-17 PROCEDURE — 99000 SPECIMEN HANDLING OFFICE-LAB: CPT | Performed by: NURSE PRACTITIONER

## 2018-04-17 RX ORDER — CETIRIZINE HYDROCHLORIDE 10 MG/1
TABLET ORAL
Qty: 90 TABLET | Refills: 3 | Status: SHIPPED | OUTPATIENT
Start: 2018-04-17 | End: 2019-06-05

## 2018-04-17 RX ORDER — CYCLOBENZAPRINE HCL 10 MG
TABLET ORAL
Qty: 90 TABLET | Refills: 3 | Status: SHIPPED | OUTPATIENT
Start: 2018-04-17 | End: 2018-07-10

## 2018-04-17 RX ORDER — AMLODIPINE BESYLATE 10 MG/1
10 TABLET ORAL DAILY
Qty: 90 TABLET | Refills: 1 | Status: SHIPPED | OUTPATIENT
Start: 2018-04-17 | End: 2019-02-21

## 2018-04-17 RX ORDER — LISINOPRIL 10 MG/1
10 TABLET ORAL DAILY
Qty: 90 TABLET | Refills: 1 | Status: SHIPPED | OUTPATIENT
Start: 2018-04-17 | End: 2019-04-03

## 2018-04-17 RX ORDER — MONTELUKAST SODIUM 10 MG/1
10 TABLET ORAL AT BEDTIME
Qty: 90 TABLET | Refills: 3 | Status: SHIPPED | OUTPATIENT
Start: 2018-04-17 | End: 2019-09-16

## 2018-04-17 ASSESSMENT — ANXIETY QUESTIONNAIRES
GAD7 TOTAL SCORE: 0
6. BECOMING EASILY ANNOYED OR IRRITABLE: NOT AT ALL
3. WORRYING TOO MUCH ABOUT DIFFERENT THINGS: NOT AT ALL
7. FEELING AFRAID AS IF SOMETHING AWFUL MIGHT HAPPEN: NOT AT ALL
5. BEING SO RESTLESS THAT IT IS HARD TO SIT STILL: NOT AT ALL
1. FEELING NERVOUS, ANXIOUS, OR ON EDGE: NOT AT ALL
IF YOU CHECKED OFF ANY PROBLEMS ON THIS QUESTIONNAIRE, HOW DIFFICULT HAVE THESE PROBLEMS MADE IT FOR YOU TO DO YOUR WORK, TAKE CARE OF THINGS AT HOME, OR GET ALONG WITH OTHER PEOPLE: NOT DIFFICULT AT ALL
4. TROUBLE RELAXING: NOT AT ALL
2. NOT BEING ABLE TO STOP OR CONTROL WORRYING: NOT AT ALL

## 2018-04-17 ASSESSMENT — PAIN SCALES - GENERAL: PAINLEVEL: NO PAIN (0)

## 2018-04-17 NOTE — PROGRESS NOTES
A1C added - pls notify lab to add this - due to elevated glucose  Other labs look good  D level pending    [facility]  [phone]

## 2018-04-17 NOTE — PATIENT INSTRUCTIONS
1. Left Achilles tendinitis  - MR Ankle Left w/o Contrast  - Orthopedic consultation ordered  - Aleve PRN  - Caution with movement  - Stable footwear    2. Benign essential hypertension  - TSH with free T4 reflex  - Basic metabolic panel  - lisinopril (PRINIVIL/ZESTRIL) 10 MG tablet; Take 1 tablet (10 mg) by mouth daily  Dispense: 90 tablet; Refill: 1  - amLODIPine (NORVASC) 10 MG tablet; Take 1 tablet (10 mg) by mouth daily  Dispense: 90 tablet; Refill: 1    3. Vitamin D deficiency  - Vitamin D level  - D3 OTC    4. RACHELE (obstructive sleep apnea)  - CPAP  - order for DME; Equipment being ordered: C-pap supplies, needs a mask - his broke  Dispense: 1 Device; Refill: 0    5. Cramp of limb  - You can try adding Magnesium supplement starting at 200 mg daily   - cyclobenzaprine (FLEXERIL) 10 MG tablet; TAKE 1 TABLET BY MOUTH NIGHTLY AS NEEDED FOR MUSCLE SPASMS  Dispense: 90 tablet; Refill: 3    6. Chronic seasonal allergic rhinitis, unspecified trigger - including itching of skin  - cetirizine (ZYRTEC) 10 MG tablet; TAKE 1 TABLET BY MOUTH EVERY EVENING  Dispense: 90 tablet; Refill: 3  - montelukast (SINGULAIR) 10 MG tablet; Take 1 tablet (10 mg) by mouth At Bedtime  Dispense: 90 tablet; Refill: 3  - Hypoallergenic laundry detergent  - Aveeno bath and shampoo products      FU as needed, otherwise 6 months  Ortho as scheduled      Samira Whitney NP  [facility]

## 2018-04-17 NOTE — NURSING NOTE
"Chief Complaint   Patient presents with     Musculoskeletal Problem     left foot        Initial /78 (BP Location: Right arm, Patient Position: Chair, Cuff Size: Adult Large)  Pulse 90  Temp 96.7  F (35.9  C) (Tympanic)  Resp 18  Ht 6' 3\" (1.905 m)  Wt (!) 359 lb (162.8 kg)  SpO2 95%  BMI 44.87 kg/m2 Estimated body mass index is 44.87 kg/(m^2) as calculated from the following:    Height as of this encounter: 6' 3\" (1.905 m).    Weight as of this encounter: 359 lb (162.8 kg).  Medication Reconciliation: complete   Pamela M Lechevalier LPN      "

## 2018-04-17 NOTE — MR AVS SNAPSHOT
After Visit Summary   4/17/2018    Jason Duncan    MRN: [id number]           Patient Information     Date Of Birth          1970        Visit Information        Provider Department      4/17/2018 8:45 AM Samira Whitney NP [facility]        Today's Diagnoses     Left Achilles tendinitis    -  1    Benign essential hypertension        Vitamin D deficiency        RACHELE (obstructive sleep apnea)        Cramp of limb        Chronic seasonal allergic rhinitis, unspecified trigger          Care Instructions          1. Left Achilles tendinitis  - MR Ankle Left w/o Contrast  - Orthopedic consultation ordered  - Aleve PRN  - Caution with movement  - Stable footwear    2. Benign essential hypertension  - TSH with free T4 reflex  - Basic metabolic panel  - lisinopril (PRINIVIL/ZESTRIL) 10 MG tablet; Take 1 tablet (10 mg) by mouth daily  Dispense: 90 tablet; Refill: 1  - amLODIPine (NORVASC) 10 MG tablet; Take 1 tablet (10 mg) by mouth daily  Dispense: 90 tablet; Refill: 1    3. Vitamin D deficiency  - Vitamin D level  - D3 OTC    4. RACHELE (obstructive sleep apnea)  - CPAP  - order for DME; Equipment being ordered: C-pap supplies, needs a mask - his broke  Dispense: 1 Device; Refill: 0    5. Cramp of limb  - You can try adding Magnesium supplement starting at 200 mg daily   - cyclobenzaprine (FLEXERIL) 10 MG tablet; TAKE 1 TABLET BY MOUTH NIGHTLY AS NEEDED FOR MUSCLE SPASMS  Dispense: 90 tablet; Refill: 3    6. Chronic seasonal allergic rhinitis, unspecified trigger - including itching of skin  - cetirizine (ZYRTEC) 10 MG tablet; TAKE 1 TABLET BY MOUTH EVERY EVENING  Dispense: 90 tablet; Refill: 3  - montelukast (SINGULAIR) 10 MG tablet; Take 1 tablet (10 mg) by mouth At Bedtime  Dispense: 90 tablet; Refill: 3  - Hypoallergenic laundry detergent  - Aveeno bath and shampoo products      FU as needed, otherwise 6 months  Ortho as scheduled      Samira Whitney NP  [facility]             Follow-ups after your visit        Additional Services     ORTHOPEDICS ADULT REFERRAL       Your provider has referred you to: [facility], left achilles tendon swelling, MRI ordered    Please be aware that coverage of these services is subject to the terms and limitations of your health insurance plan.  Call member services at your health plan with any benefit or coverage questions.      Please bring the following to your appointment:    >>   Any x-rays, CTs or MRIs which have been performed.  Contact the facility where they were done to arrange for  prior to your scheduled appointment.    >>   List of current medications   >>   This referral request   >>   Any documents/labs given to you for this referral                  Future tests that were ordered for you today     Open Future Orders        Priority Expected Expires Ordered    MR Ankle Left w/o Contrast Routine  4/17/2019 4/17/2018            Who to contact     If you have questions or need follow up information about today's clinic visit or your schedule please contact [facility] directly at [phone].  Normal or non-critical lab and imaging results will be communicated to you by [employer] Inchart, letter or phone within 4 business days after the clinic has received the results. If you do not hear from us within 7 days, please contact the clinic through [employer] Inchart or phone. If you have a critical or abnormal lab result, we will notify you by phone as soon as possible.  Submit refill requests through [employer] or call your pharmacy and they will forward the refill request to us. Please allow 3 business days for your refill to be completed.          Additional Information About Your Visit        [employer] Inchart Information     [employer] gives you secure access to your electronic health record. If you see a primary care provider, you can also send messages to your care team and make appointments. If you have questions, please call your  "primary care clinic.  If you do not have a primary care provider, please call [phone] and they will assist you.        Care EveryWhere ID     This is your Care EveryWhere ID. This could be used by other organizations to access your [place] medical records  [id number]        Your Vitals Were     Pulse Temperature Respirations Height Pulse Oximetry BMI (Body Mass Index)    90 96.7  F (35.9  C) (Tympanic) 18 6' 3\" (1.905 m) 95% 44.87 kg/m2       Blood Pressure from Last 3 Encounters:   04/17/18 128/78   10/30/17 120/80   04/12/17 116/82    Weight from Last 3 Encounters:   04/17/18 (!) 359 lb (162.8 kg)   10/30/17 253 lb (114.8 kg)   04/12/17 (!) 328 lb (148.8 kg)              We Performed the Following     Basic metabolic panel     ORTHOPEDICS ADULT REFERRAL     TSH with free T4 reflex     Vitamin D Deficiency          Today's Medication Changes          These changes are accurate as of 4/17/18  9:13 AM.  If you have any questions, ask your nurse or doctor.               Start taking these medicines.        Dose/Directions    montelukast 10 MG tablet   Commonly known as:  SINGULAIR   Used for:  Chronic seasonal allergic rhinitis, unspecified trigger   Started by:  Samira Whitney NP        Dose:  10 mg   Take 1 tablet (10 mg) by mouth At Bedtime   Quantity:  90 tablet   Refills:  3       order for DME   Used for:  RACHELE (obstructive sleep apnea)   Started by:  Samira Whitney NP        Equipment being ordered: C-pap supplies, needs a mask - his broke   Quantity:  1 Device   Refills:  0         These medicines have changed or have updated prescriptions.        Dose/Directions    amLODIPine 10 MG tablet   Commonly known as:  NORVASC   This may have changed:  See the new instructions.   Used for:  Benign essential hypertension   Changed by:  Samira Whitney NP        Dose:  10 mg   Take 1 tablet (10 mg) by mouth daily   Quantity:  90 tablet   Refills:  1       cetirizine 10 MG tablet   Commonly known as:  zyrTEC   This may " have changed:  See the new instructions.   Used for:  Chronic seasonal allergic rhinitis, unspecified trigger   Changed by:  Samira Whitney NP        TAKE 1 TABLET BY MOUTH EVERY EVENING   Quantity:  90 tablet   Refills:  3       cyclobenzaprine 10 MG tablet   Commonly known as:  FLEXERIL   This may have changed:  See the new instructions.   Used for:  Cramp of limb   Changed by:  Samira Whitney NP        TAKE 1 TABLET BY MOUTH NIGHTLY AS NEEDED FOR MUSCLE SPASMS   Quantity:  90 tablet   Refills:  3       lisinopril 10 MG tablet   Commonly known as:  PRINIVIL/ZESTRIL   This may have changed:  See the new instructions.   Used for:  Benign essential hypertension   Changed by:  Samira Whitney NP        Dose:  10 mg   Take 1 tablet (10 mg) by mouth daily   Quantity:  90 tablet   Refills:  1            Where to get your medicines      These medications were sent to [facility]  [address]  [address] [place] [address]     Phone:  [phone]     amLODIPine 10 MG tablet    cetirizine 10 MG tablet    cyclobenzaprine 10 MG tablet    lisinopril 10 MG tablet    montelukast 10 MG tablet         Some of these will need a paper prescription and others can be bought over the counter.  Ask your nurse if you have questions.     Bring a paper prescription for each of these medications     order for DME                Primary Care Provider Office Phone # Fax #    Samira Whitney [phone] [address]  [place] [address]        Equal Access to Services     AWAIS RODRÍGUEZ AH: Hadii walt julian hadasho Soomaali, waaxda luqadaha, qaybta kaalmada adeegyada, waxay miroslava haley. So [facility] [phone].    ATENCIÓN: Si habla español, tiene a duenas disposición servicios gratuitos de asistencia lingüística. Llame al [phone].    We comply with applicable federal civil rights laws and [place] laws. We do not discriminate on the basis of race, color, national  origin, age, disability, sex, sexual orientation, or gender identity.            Thank you!     Thank you for choosing [facility]  for your care. Our goal is always to provide you with excellent care. Hearing back from our patients is one way we can continue to improve our services. Please take a few minutes to complete the written survey that you may receive in the mail after your visit with us. Thank you!             Your Updated Medication List - Protect others around you: Learn how to safely use, store and throw away your medicines at www.disposemymeds.org.          This list is accurate as of 4/17/18  9:13 AM.  Always use your most recent med list.                   Brand Name Dispense Instructions for use Diagnosis    amLODIPine 10 MG tablet    NORVASC    90 tablet    Take 1 tablet (10 mg) by mouth daily    Benign essential hypertension       aspirin 81 MG tablet      Take by mouth daily        cetirizine 10 MG tablet    zyrTEC    90 tablet    TAKE 1 TABLET BY MOUTH EVERY EVENING    Chronic seasonal allergic rhinitis, unspecified trigger       cyclobenzaprine 10 MG tablet    FLEXERIL    90 tablet    TAKE 1 TABLET BY MOUTH NIGHTLY AS NEEDED FOR MUSCLE SPASMS    Cramp of limb       lisinopril 10 MG tablet    PRINIVIL/ZESTRIL    90 tablet    Take 1 tablet (10 mg) by mouth daily    Benign essential hypertension       montelukast 10 MG tablet    SINGULAIR    90 tablet    Take 1 tablet (10 mg) by mouth At Bedtime    Chronic seasonal allergic rhinitis, unspecified trigger       Multi-vitamin Tabs tablet      Take 1 tablet by mouth daily        * order for DME     1 Device    C-PAP mask   DX: Sleep apnea    Sleep apnea, unspecified type       * order for DME     1 Device    c-pap mask and supplies  DX: G47.33, sleep apnea    Sleep apnea, unspecified type       order for DME     1 Device    Equipment being ordered: C-pap supplies, needs a mask - his broke    RACHELE (obstructive sleep apnea)       VITAMIN D  (CHOLECALCIFEROL) PO      Take 4,000 Units by mouth daily        * Notice:  This list has 2 medication(s) that are the same as other medications prescribed for you. Read the directions carefully, and ask your doctor or other care provider to review them with you.

## 2018-04-17 NOTE — PROGRESS NOTES
SUBJECTIVE:   Jason Duncan is a 48 year old male who presents to clinic today for the following health issues:  Left achilles tendon pain        Left Achilles tendon pain    Onset: 1 month ago - no injury    Description:   Location: left achillis   Character: Sharp    Intensity: mild    Progression of Symptoms: same    Accompanying Signs & Symptoms:  Other symptoms: when walking down stairs has to walk side ways to painful to walk down straight    History:   Previous similar pain: no       Precipitating factors:   Trauma or overuse: no     Alleviating factors:  Improved by: nothing  Therapies Tried and outcome: ibuprofen and tylenol         Hypertension Follow-up    Outpatient blood pressures are not being checked.    Low Salt Diet: not monitoring salt      Vitamin D deficiency -  lab is due      Sleep apnea   on C-pap, needs a new mask      Problem list and histories reviewed & adjusted, as indicated.  Additional history: as documented    Patient Active Problem List   Diagnosis     Benign essential hypertension     Mixed hearing loss, unilateral     ACP (advance care planning)     Morbid obesity due to excess calories (H)     RACHELE (obstructive sleep apnea)     Vitamin D deficiency     Past Surgical History:   Procedure Laterality Date     GASTRIC RESTRICTIVE PROCEDURE, OPEN, REMOVE/REPLACE SUBCUTANEOUS PORT  2008     GI SURGERY  2015    removal of lap band      lap band  2008     nasal septoplasty       SEPTOPLASTY      Nasal       Social History   Substance Use Topics     Smoking status: Never Smoker     Smokeless tobacco: Never Used     Alcohol use Yes      Comment: rarely     Family History   Problem Relation Age of Onset     Dementia Mother 72     Cause of death     DIABETES Father      Respiratory Father      COPD     Cardiovascular Father      CHF         Current Outpatient Prescriptions   Medication Sig Dispense Refill     order for DME Equipment being ordered: C-pap supplies, needs a mask - his broke 1  Device 0     loratadine (CLARITIN) 10 MG tablet TAKE 1 TABLET BY MOUTH DAILY 30 tablet 0     cyclobenzaprine (FLEXERIL) 10 MG tablet TAKE 1 TABLET BY MOUTH NIGHTLY AS NEEDED FOR MUSCLE SPASMS 90 tablet 0     amLODIPine (NORVASC) 10 MG tablet TAKE 1 TABLET BY MOUTH DAILY 90 tablet 2     lisinopril (PRINIVIL/ZESTRIL) 10 MG tablet TAKE 1 TABLET BY MOUTH DAILY 90 tablet 1     order for DME c-pap mask and supplies    DX: G47.33, sleep apnea 1 Device 11     order for DME C-PAP mask      DX: Sleep apnea 1 Device 3     VITAMIN D, CHOLECALCIFEROL, PO Take 4,000 Units by mouth daily       cetirizine (ZYRTEC) 10 MG tablet TAKE 1 TABLET BY MOUTH EVERY EVENING 30 tablet 3     multivitamin, therapeutic with minerals (MULTI-VITAMIN) TABS Take 1 tablet by mouth daily       aspirin 81 MG tablet Take by mouth daily       No Known Allergies  Recent Labs   Lab Test  01/10/17   1043  10/21/15   0925   12/10/13   0810   A1C   --    --    --   5.5   LDL   --   105   --   117   HDL   --   51   --   48   TRIG   --   147   --   85   ALT  83*  54   --   26   CR  1.05  0.96   < >  1.19   GFRESTIMATED  76  85   < >  67   GFRESTBLACK  >90   GFR Calc    >90   GFR Calc     < >  82   POTASSIUM  3.6  4.3   < >  3.8    < > = values in this interval not displayed.      BP Readings from Last 3 Encounters:   04/17/18 128/78   10/30/17 120/80   04/12/17 116/82    Wt Readings from Last 3 Encounters:   04/17/18 (!) 359 lb (162.8 kg)   10/30/17 253 lb (114.8 kg)   04/12/17 (!) 328 lb (148.8 kg)                  Labs reviewed in EPIC    Reviewed and updated as needed this visit by clinical staff  Allergies       Reviewed and updated as needed this visit by Provider         ROS:  Constitutional, HEENT, cardiovascular, pulmonary, GI, , musculoskeletal, neuro, skin, endocrine and psych systems are negative, except as otherwise noted.    OBJECTIVE:     /78 (BP Location: Right arm, Patient Position: Chair, Cuff Size: Adult  "Large)  Pulse 90  Temp 96.7  F (35.9  C) (Tympanic)  Resp 18  Ht 6' 3\" (1.905 m)  Wt (!) 359 lb (162.8 kg)  SpO2 95%  BMI 44.87 kg/m2  Body mass index is 44.87 kg/(m^2).     GENERAL: healthy, alert and no distress  EYES: Eyes grossly normal to inspection, PERRL and conjunctivae and sclerae normal  HENT: ear canals and TM's normal, nose and mouth without ulcers or lesions  NECK: no adenopathy, no asymmetry, masses, or scars and thyroid normal to palpation  RESP: lungs clear to auscultation - no rales, rhonchi or wheezes  CV: regular rate and rhythm, normal S1 S2, no S3 or S4, no murmur, click or rub, no peripheral edema and peripheral pulses strong  ABDOMEN: soft, nontender, no hepatosplenomegaly, no masses and bowel sounds normal  MS: Left achilles swelling, pain with flexion and extension of foot.  Good distal CMS, no foot drop  SKIN: no suspicious lesions or rashes  NEURO: Normal strength and tone, mentation intact and speech normal  PSYCH: mentation appears normal, affect normal/bright      Visit time:   Over 30 minutes are spent with the patient.   Greater than 50 % of our visit time was spent face to face and included education and counseling regarding current conditions,  medication management, and plan of care.  We discussed the importance of medication compliance.  Visit Content:   Lab testing discussed and reviewed  Any medication adjustment has been reviewed  Health Maintenance - updated as appropriate.  Record review completed      ASSESSMENT/PLAN:     Hypertension; controlled   Associated with the following complications:    None   Plan:  No changes in the patient's current treatment plan          1. Left Achilles tendinitis  - MR Ankle Left w/o Contrast  - Orthopedic consultation ordered  - Aleve PRN  - Caution with movement  - Stable footwear    2. Benign essential hypertension  - TSH with free T4 reflex  - Basic metabolic panel  - lisinopril (PRINIVIL/ZESTRIL) 10 MG tablet; Take 1 tablet (10 " mg) by mouth daily  Dispense: 90 tablet; Refill: 1  - amLODIPine (NORVASC) 10 MG tablet; Take 1 tablet (10 mg) by mouth daily  Dispense: 90 tablet; Refill: 1    3. Vitamin D deficiency  - Vitamin D level  - D3 OTC    4. RACHELE (obstructive sleep apnea)  - CPAP  - order for DME; Equipment being ordered: C-pap supplies, needs a mask - his broke  Dispense: 1 Device; Refill: 0    5. Cramp of limb  - You can try adding Magnesium supplement starting at 200 mg daily   - cyclobenzaprine (FLEXERIL) 10 MG tablet; TAKE 1 TABLET BY MOUTH NIGHTLY AS NEEDED FOR MUSCLE SPASMS  Dispense: 90 tablet; Refill: 3    6. Chronic seasonal allergic rhinitis, unspecified trigger - including itching of skin  - cetirizine (ZYRTEC) 10 MG tablet; TAKE 1 TABLET BY MOUTH EVERY EVENING  Dispense: 90 tablet; Refill: 3  - montelukast (SINGULAIR) 10 MG tablet; Take 1 tablet (10 mg) by mouth At Bedtime  Dispense: 90 tablet; Refill: 3  - Hypoallergenic laundry detergent  - Aveeno bath and shampoo products        FU as needed, otherwise 6 months  Ortho as scheduled      Samira Whitney NP  [facility]

## 2018-04-18 LAB — DEPRECATED CALCIDIOL+CALCIFEROL SERPL-MC: 36 UG/L (ref 20–75)

## 2018-04-18 ASSESSMENT — ANXIETY QUESTIONNAIRES: GAD7 TOTAL SCORE: 0

## 2018-04-18 ASSESSMENT — PATIENT HEALTH QUESTIONNAIRE - PHQ9: SUM OF ALL RESPONSES TO PHQ QUESTIONS 1-9: 0

## 2018-04-19 ENCOUNTER — HOSPITAL ENCOUNTER (OUTPATIENT)
Dept: MRI IMAGING | Facility: HOSPITAL | Age: 48
Discharge: HOME | End: 2018-04-19
Attending: NURSE PRACTITIONER | Admitting: NURSE PRACTITIONER
Payer: COMMERCIAL

## 2018-04-19 DIAGNOSIS — M76.62 LEFT ACHILLES TENDINITIS: ICD-10-CM

## 2018-04-19 PROCEDURE — 73721 MRI JNT OF LWR EXTRE W/O DYE: CPT | Mod: TC,LT

## 2018-04-20 NOTE — PROGRESS NOTES
Tendinosis   Referred  to ortho due to achilles swelling    Samira Whitney [facility]  [phone]

## 2018-04-27 ENCOUNTER — TRANSFERRED RECORDS (OUTPATIENT)
Dept: HEALTH INFORMATION MANAGEMENT | Facility: CLINIC | Age: 48
End: 2018-04-27

## 2018-04-27 DIAGNOSIS — R73.09 ELEVATED GLUCOSE: ICD-10-CM

## 2018-04-27 DIAGNOSIS — I10 BENIGN ESSENTIAL HYPERTENSION: ICD-10-CM

## 2018-04-27 LAB
EST. AVERAGE GLUCOSE BLD GHB EST-MCNC: 154 MG/DL
HBA1C MFR BLD: 7 % (ref 0–6.4)

## 2018-04-27 PROCEDURE — 36415 COLL VENOUS BLD VENIPUNCTURE: CPT | Performed by: NURSE PRACTITIONER

## 2018-04-27 PROCEDURE — 83036 HEMOGLOBIN GLYCOSYLATED A1C: CPT | Performed by: NURSE PRACTITIONER

## 2018-04-27 NOTE — PROGRESS NOTES
Glucose was elevated, A1C confirms type 2 diabetes.    He should set up a morning fasting visit to discuss a plan.  A1C is relatively low, should be easy to turn around.    [facility]  [phone]

## 2018-05-01 DIAGNOSIS — J30.2 SEASONAL ALLERGIC RHINITIS: Primary | ICD-10-CM

## 2018-05-01 RX ORDER — LORATADINE 10 MG/1
TABLET ORAL
Qty: 90 TABLET | Refills: 0 | Status: SHIPPED | OUTPATIENT
Start: 2018-05-01 | End: 2018-05-29

## 2018-05-01 NOTE — TELEPHONE ENCOUNTER
[facility] is called, as they have requested a refill of Claritin which was filled this morning.    The refill came up as all green for refill, but it is noted in the patients chart that he takes 2 different antihistamines daily.  The pharmacy will change the qty of the Claritin refill to #30 tabs, and have the patient contact Samira Whitney CNP regarding continued use of both antihistamines.    The pharmacy states he was given Claritin by Dr. Meredith, and Zyrtec by Samira Whitney CNP.  Akiko Grier

## 2018-05-29 DIAGNOSIS — J30.2 SEASONAL ALLERGIC RHINITIS: ICD-10-CM

## 2018-05-30 RX ORDER — LORATADINE 10 MG/1
TABLET ORAL
Qty: 30 TABLET | Refills: 11 | Status: SHIPPED | OUTPATIENT
Start: 2018-05-30 | End: 2018-08-07

## 2018-05-30 NOTE — TELEPHONE ENCOUNTER
Claritin  Last Written Prescription Date: 5/1/18  Last Fill Quantity: 90 # of Refills: 0  Last Office Visit: 4/17/18

## 2018-07-10 ENCOUNTER — OFFICE VISIT (OUTPATIENT)
Dept: FAMILY MEDICINE | Facility: OTHER | Age: 48
End: 2018-07-10
Attending: NURSE PRACTITIONER
Payer: COMMERCIAL

## 2018-07-10 ENCOUNTER — RADIANT APPOINTMENT (OUTPATIENT)
Dept: GENERAL RADIOLOGY | Facility: OTHER | Age: 48
End: 2018-07-10
Attending: NURSE PRACTITIONER
Payer: COMMERCIAL

## 2018-07-10 VITALS
TEMPERATURE: 97.2 F | SYSTOLIC BLOOD PRESSURE: 128 MMHG | RESPIRATION RATE: 14 BRPM | OXYGEN SATURATION: 97 % | WEIGHT: 315 LBS | BODY MASS INDEX: 39.17 KG/M2 | HEART RATE: 88 BPM | HEIGHT: 75 IN | DIASTOLIC BLOOD PRESSURE: 72 MMHG

## 2018-07-10 DIAGNOSIS — M25.511 ACUTE PAIN OF RIGHT SHOULDER: Primary | ICD-10-CM

## 2018-07-10 DIAGNOSIS — E11.9 TYPE 2 DIABETES MELLITUS WITHOUT COMPLICATION, WITHOUT LONG-TERM CURRENT USE OF INSULIN (H): ICD-10-CM

## 2018-07-10 DIAGNOSIS — S49.90XA SHOULDER INJURY: ICD-10-CM

## 2018-07-10 PROBLEM — E10.9 DIABETES MELLITUS TYPE 1 (H): Status: RESOLVED | Noted: 2018-07-10 | Resolved: 2018-07-10

## 2018-07-10 PROBLEM — E10.9 DIABETES MELLITUS TYPE 1 (H): Status: ACTIVE | Noted: 2018-07-10

## 2018-07-10 PROBLEM — R25.2 CRAMP OF LIMB: Status: RESOLVED | Noted: 2018-04-17 | Resolved: 2018-07-10

## 2018-07-10 PROCEDURE — 99214 OFFICE O/P EST MOD 30 MIN: CPT | Performed by: NURSE PRACTITIONER

## 2018-07-10 PROCEDURE — 73030 X-RAY EXAM OF SHOULDER: CPT | Mod: TC

## 2018-07-10 RX ORDER — METFORMIN HCL 500 MG
500 TABLET, EXTENDED RELEASE 24 HR ORAL 2 TIMES DAILY WITH MEALS
Qty: 60 TABLET | Refills: 3 | Status: SHIPPED | OUTPATIENT
Start: 2018-07-10 | End: 2018-11-24

## 2018-07-10 RX ORDER — CYCLOBENZAPRINE HCL 10 MG
TABLET ORAL
Qty: 90 TABLET | Refills: 3 | Status: SHIPPED | OUTPATIENT
Start: 2018-07-10 | End: 2019-09-16

## 2018-07-10 ASSESSMENT — ANXIETY QUESTIONNAIRES
7. FEELING AFRAID AS IF SOMETHING AWFUL MIGHT HAPPEN: NOT AT ALL
GAD7 TOTAL SCORE: 0
6. BECOMING EASILY ANNOYED OR IRRITABLE: NOT AT ALL
5. BEING SO RESTLESS THAT IT IS HARD TO SIT STILL: NOT AT ALL
IF YOU CHECKED OFF ANY PROBLEMS ON THIS QUESTIONNAIRE, HOW DIFFICULT HAVE THESE PROBLEMS MADE IT FOR YOU TO DO YOUR WORK, TAKE CARE OF THINGS AT HOME, OR GET ALONG WITH OTHER PEOPLE: NOT DIFFICULT AT ALL
3. WORRYING TOO MUCH ABOUT DIFFERENT THINGS: NOT AT ALL
2. NOT BEING ABLE TO STOP OR CONTROL WORRYING: NOT AT ALL
1. FEELING NERVOUS, ANXIOUS, OR ON EDGE: NOT AT ALL

## 2018-07-10 ASSESSMENT — PAIN SCALES - GENERAL: PAINLEVEL: MILD PAIN (3)

## 2018-07-10 ASSESSMENT — PATIENT HEALTH QUESTIONNAIRE - PHQ9: 5. POOR APPETITE OR OVEREATING: NOT AT ALL

## 2018-07-10 NOTE — NURSING NOTE
"Chief Complaint   Patient presents with     Shoulder Pain     right shoulder       Initial /72 (BP Location: Right arm, Patient Position: Sitting, Cuff Size: Adult Large)  Pulse 88  Temp 97.2  F (36.2  C) (Tympanic)  Resp 14  Ht 6' 3\" (1.905 m)  Wt (!) 359 lb (162.8 kg)  SpO2 97%  BMI 44.87 kg/m2 Estimated body mass index is 44.87 kg/(m^2) as calculated from the following:    Height as of this encounter: 6' 3\" (1.905 m).    Weight as of this encounter: 359 lb (162.8 kg).  Medication Reconciliation: complete    Iveth Linder LPN    "

## 2018-07-10 NOTE — PATIENT INSTRUCTIONS
ASSESSMENT/PLAN:       1. Acute pain of right shoulder  - XR Shoulder Right G/E 2 Views; Future  - cyclobenzaprine (FLEXERIL) 10 MG tablet; TAKE 1 TABLET BY MOUTH NIGHTLY AS NEEDED FOR MUSCLE SPASMS  Dispense: 90 tablet; Refill: 3  - Orthopedic consultation  - Ice  - Rest as able  - Ibuprofen as needed          2. Type 2 diabetes mellitus without complication, without long-term current use of insulin (H)  - metFORMIN (GLUCOPHAGE-XR) 500 MG 24 hr tablet; Take 1 tablet (500 mg) by mouth 2 times daily (with meals)  Dispense: 60 tablet; Refill: 3  - FU Early August as scheduled          Samira Whitney NP  [facility]

## 2018-07-10 NOTE — MR AVS SNAPSHOT
After Visit Summary   7/10/2018    Jason Duncan    MRN: [id number]           Patient Information     Date Of Birth          1970        Visit Information        Provider Department      7/10/2018 11:15 AM Samira Whitney NP [facility]        Today's Diagnoses     Acute pain of right shoulder    -  1    Type 2 diabetes mellitus without complication, without long-term current use of insulin (H)          Care Instructions      ASSESSMENT/PLAN:       1. Acute pain of right shoulder  - XR Shoulder Right G/E 2 Views; Future  - cyclobenzaprine (FLEXERIL) 10 MG tablet; TAKE 1 TABLET BY MOUTH NIGHTLY AS NEEDED FOR MUSCLE SPASMS  Dispense: 90 tablet; Refill: 3  - Orthopedic consultation  - Ice  - Rest as able  - Ibuprofen as needed          2. Type 2 diabetes mellitus without complication, without long-term current use of insulin (H)  - metFORMIN (GLUCOPHAGE-XR) 500 MG 24 hr tablet; Take 1 tablet (500 mg) by mouth 2 times daily (with meals)  Dispense: 60 tablet; Refill: 3  - FU Early August as scheduled          Samira Whitney NP  [facility]            Follow-ups after your visit        Additional Services     ORTHOPEDICS ADULT REFERRAL       Your provider has referred you to: [facility] this week please    Please be aware that coverage of these services is subject to the terms and limitations of your health insurance plan.  Call member services at your health plan with any benefit or coverage questions.      Please bring the following to your appointment:    >>   Any x-rays, CTs or MRIs which have been performed.  Contact the facility where they were done to arrange for  prior to your scheduled appointment.    >>   List of current medications   >>   This referral request   >>   Any documents/labs given to you for this referral                  Your next 10 appointments already scheduled     Jul 18, 2018  2:20 PM CDT   (Arrive by 2:05 PM)   New Visit with Juan Ramon GARCIA  "MD Noble    ORTHOPEDICS ([facility] [place] )    [address]  [place] [address] [phone]              Who to contact     If you have questions or need follow up information about today's clinic visit or your schedule please contact [facility] MARYLOU directly at [phone].  Normal or non-critical lab and imaging results will be communicated to you by MyChart, letter or phone within 4 business days after the clinic has received the results. If you do not hear from us within 7 days, please contact the clinic through Soonrhart or phone. If you have a critical or abnormal lab result, we will notify you by phone as soon as possible.  Submit refill requests through Carticipate or call your pharmacy and they will forward the refill request to us. Please allow 3 business days for your refill to be completed.          Additional Information About Your Visit        SoonrharFeast Information     Carticipate gives you secure access to your electronic health record. If you see a primary care provider, you can also send messages to your care team and make appointments. If you have questions, please call your primary care clinic.  If you do not have a primary care provider, please call [phone] and they will assist you.        Care EveryWhere ID     This is your Care EveryWhere ID. This could be used by other organizations to access your [facility] medical records  [id number]        Your Vitals Were     Pulse Temperature Respirations Height Pulse Oximetry BMI (Body Mass Index)    88 97.2  F (36.2  C) (Tympanic) 14 6' 3\" (1.905 m) 97% 44.87 kg/m2       Blood Pressure from Last 3 Encounters:   07/10/18 128/72   04/17/18 128/78   10/30/17 120/80    Weight from Last 3 Encounters:   07/10/18 (!) 359 lb (162.8 kg)   04/17/18 (!) 359 lb (162.8 kg)   10/30/17 253 lb (114.8 kg)              We Performed the Following     ORTHOPEDICS ADULT REFERRAL          Today's Medication Changes          These changes " are accurate as of 7/10/18 12:45 PM.  If you have any questions, ask your nurse or doctor.               Start taking these medicines.        Dose/Directions    metFORMIN 500 MG 24 hr tablet   Commonly known as:  GLUCOPHAGE-XR   Used for:  Type 2 diabetes mellitus without complication, without long-term current use of insulin (H)   Started by:  Samira Whitney NP        Dose:  500 mg   Take 1 tablet (500 mg) by mouth 2 times daily (with meals)   Quantity:  60 tablet   Refills:  3         These medicines have changed or have updated prescriptions.        Dose/Directions    order for DME   This may have changed:  Another medication with the same name was removed. Continue taking this medication, and follow the directions you see here.   Used for:  Sleep apnea, unspecified type   Changed by:  Samira Whitney NP        c-pap mask and supplies  DX: G47.33, sleep apnea   Quantity:  1 Device   Refills:  11         Stop taking these medicines if you haven't already. Please contact your care team if you have questions.     order for DME   Stopped by:  Samira Whitney NP                Where to get your medicines      These medications were sent to [facility] [place] - [address]  [address] [place] [address]     Phone:  [phone]     cyclobenzaprine 10 MG tablet    metFORMIN 500 MG 24 hr tablet                Primary Care Provider Office Phone # Fax #    Samira Whitney [phone] [address]  [place] [address]        Equal Access to Services     [facility]: Hadii walt malhotrao Somaldonado, waaxda luqadaha, qaybta kaalmada adeegyada, carlos haley. So [facility] [phone].    ATENCIÓN: Si habla español, tiene a duenas disposición servicios gratuitos de asistencia lingüística. Llame al [phone].    We comply with applicable federal civil rights laws and [place] laws. We do not discriminate on the basis of race, color, national origin,  age, disability, sex, sexual orientation, or gender identity.            Thank you!     Thank you for choosing [facility]  for your care. Our goal is always to provide you with excellent care. Hearing back from our patients is one way we can continue to improve our services. Please take a few minutes to complete the written survey that you may receive in the mail after your visit with us. Thank you!             Your Updated Medication List - Protect others around you: Learn how to safely use, store and throw away your medicines at www.disposemymeds.org.          This list is accurate as of 7/10/18 12:45 PM.  Always use your most recent med list.                   Brand Name Dispense Instructions for use Diagnosis    amLODIPine 10 MG tablet    NORVASC    90 tablet    Take 1 tablet (10 mg) by mouth daily    Benign essential hypertension       aspirin 81 MG tablet      Take by mouth daily        cetirizine 10 MG tablet    zyrTEC    90 tablet    TAKE 1 TABLET BY MOUTH EVERY EVENING    Chronic seasonal allergic rhinitis, unspecified trigger       cyclobenzaprine 10 MG tablet    FLEXERIL    90 tablet    TAKE 1 TABLET BY MOUTH NIGHTLY AS NEEDED FOR MUSCLE SPASMS    Acute pain of right shoulder       lisinopril 10 MG tablet    PRINIVIL/ZESTRIL    90 tablet    Take 1 tablet (10 mg) by mouth daily    Benign essential hypertension       loratadine 10 MG tablet    CLARITIN    30 tablet    TAKE 1 TABLET BY MOUTH DAILY    Seasonal allergic rhinitis       metFORMIN 500 MG 24 hr tablet    GLUCOPHAGE-XR    60 tablet    Take 1 tablet (500 mg) by mouth 2 times daily (with meals)    Type 2 diabetes mellitus without complication, without long-term current use of insulin (H)       montelukast 10 MG tablet    SINGULAIR    90 tablet    Take 1 tablet (10 mg) by mouth At Bedtime    Chronic seasonal allergic rhinitis, unspecified trigger       Multi-vitamin Tabs tablet      Take 1 tablet by mouth daily        order for DME     1  Device    c-pap mask and supplies  DX: G47.33, sleep apnea    Sleep apnea, unspecified type       VITAMIN D (CHOLECALCIFEROL) PO      Take 4,000 Units by mouth daily

## 2018-07-10 NOTE — PROGRESS NOTES
SUBJECTIVE:   Jason Duncan is a 48 year old male who presents to clinic today for the following health issues:      Shoulder pain    Onset: 2 weeks    Description:   Location: right shoulder  Character: Dull ache, sharp with movement    Intensity: moderate    Progression of Symptoms: better    Accompanying Signs & Symptoms:  Other symptoms: none    History:   Previous similar pain: no       Precipitating factors:   Trauma or overuse: YES- over use    Alleviating factors:  Improved by: nothing  Therapies Tried and outcome: none            Elevated A1C at last visit, recheck is not quite due:  Labs from 4/27/18 note A1C of 7.0.    Prior A1C was checked in 2013, and was 5.5.  Lipid profiles dating back to 2013 are all WNL  Lab recheck due after 7/27/18              Problem list and histories reviewed & adjusted, as indicated.  Additional history: as documented    Patient Active Problem List   Diagnosis     Benign essential hypertension     Mixed hearing loss, unilateral     ACP (advance care planning)     Morbid obesity due to excess calories (H)     RACHELE (obstructive sleep apnea)     Vitamin D deficiency     Diabetes mellitus, type 2 (H)     Past Surgical History:   Procedure Laterality Date     GASTRIC RESTRICTIVE PROCEDURE, OPEN, REMOVE/REPLACE SUBCUTANEOUS PORT  2008     GI SURGERY  2015    removal of lap band      lap band  2008     nasal septoplasty       SEPTOPLASTY      Nasal       Social History   Substance Use Topics     Smoking status: Never Smoker     Smokeless tobacco: Never Used     Alcohol use Yes      Comment: rarely     Family History   Problem Relation Age of Onset     Dementia Mother 72     Cause of death     Diabetes Father      Respiratory Father      COPD     Cardiovascular Father      CHF         Current Outpatient Prescriptions   Medication Sig Dispense Refill     amLODIPine (NORVASC) 10 MG tablet Take 1 tablet (10 mg) by mouth daily 90 tablet 1     aspirin 81 MG tablet Take by mouth daily        cetirizine (ZYRTEC) 10 MG tablet TAKE 1 TABLET BY MOUTH EVERY EVENING 90 tablet 3     cyclobenzaprine (FLEXERIL) 10 MG tablet TAKE 1 TABLET BY MOUTH NIGHTLY AS NEEDED FOR MUSCLE SPASMS 90 tablet 3     lisinopril (PRINIVIL/ZESTRIL) 10 MG tablet Take 1 tablet (10 mg) by mouth daily 90 tablet 1     loratadine (CLARITIN) 10 MG tablet TAKE 1 TABLET BY MOUTH DAILY 30 tablet 11     montelukast (SINGULAIR) 10 MG tablet Take 1 tablet (10 mg) by mouth At Bedtime 90 tablet 3     multivitamin, therapeutic with minerals (MULTI-VITAMIN) TABS Take 1 tablet by mouth daily       order for DME c-pap mask and supplies    DX: G47.33, sleep apnea 1 Device 11     VITAMIN D, CHOLECALCIFEROL, PO Take 4,000 Units by mouth daily       No Known Allergies  Recent Labs   Lab Test  04/27/18   1026  04/17/18   0919  01/10/17   1043  10/21/15   0925   12/10/13   0810   A1C  7.0*   --    --    --    --   5.5   LDL   --    --    --   105   --   117   HDL   --    --    --   51   --   48   TRIG   --    --    --   147   --   85   ALT   --    --   83*  54   --   26   CR   --   1.04  1.05  0.96   < >  1.19   GFRESTIMATED   --   77  76  85   < >  67   GFRESTBLACK   --   >90  >90  African American GFR Calc    >90   GFR Calc     < >  82   POTASSIUM   --   3.9  3.6  4.3   < >  3.8   TSH   --   1.66   --    --    --    --     < > = values in this interval not displayed.      BP Readings from Last 3 Encounters:   07/10/18 128/72   04/17/18 128/78   10/30/17 120/80    Wt Readings from Last 3 Encounters:   07/10/18 (!) 359 lb (162.8 kg)   04/17/18 (!) 359 lb (162.8 kg)   10/30/17 253 lb (114.8 kg)                  Labs reviewed in EPIC    Reviewed and updated as needed this visit by clinical staff  Tobacco  Allergies  Meds  Med Hx       Reviewed and updated as needed this visit by Provider  Med Hx            ROS:  Constitutional, HEENT, cardiovascular, pulmonary, gi and gu systems are negative, except as otherwise  "noted.        OBJECTIVE:     /72 (BP Location: Right arm, Patient Position: Sitting, Cuff Size: Adult Large)  Pulse 88  Temp 97.2  F (36.2  C) (Tympanic)  Resp 14  Ht 6' 3\" (1.905 m)  Wt (!) 359 lb (162.8 kg)  SpO2 97%  BMI 44.87 kg/m2  Body mass index is 44.87 kg/(m^2).       GENERAL: healthy, alert and no distress  HENT: ear canals and TM's normal, nose and mouth without ulcers or lesions  NECK: no adenopathy, no asymmetry, masses, or scars and thyroid normal to palpation  RESP: lungs clear to auscultation - no rales, rhonchi or wheezes  CV: regular rate and rhythm, normal S1 S2, no S3 or S4, no murmur, click or rub, no peripheral edema and peripheral pulses strong  MS: Limited ROM right shoulder with AC pain.  No hand weakness, no numbness or tingling.  SKIN: no suspicious lesions or rashes  PSYCH: mentation appears normal, affect normal/bright        Diagnostic Test Results:  Results for orders placed or performed in visit on 04/27/18   Hemoglobin A1c   Result Value Ref Range    Hemoglobin A1C 7.0 (H) 0 - 6.4 %   Estimated Average Glucose   Result Value Ref Range    Estimated Average Glucose 154 mg/dL       ASSESSMENT/PLAN:       1. Acute pain of right shoulder  - XR Shoulder Right G/E 2 Views; Future  - cyclobenzaprine (FLEXERIL) 10 MG tablet; TAKE 1 TABLET BY MOUTH NIGHTLY AS NEEDED FOR MUSCLE SPASMS  Dispense: 90 tablet; Refill: 3  - Orthopedic consultation  - Ice  - Rest as able  - Ibuprofen as needed          2. Type 2 diabetes mellitus without complication, without long-term current use of insulin (H)  - metFORMIN (GLUCOPHAGE-XR) 500 MG 24 hr tablet; Take 1 tablet (500 mg) by mouth 2 times daily (with meals)  Dispense: 60 tablet; Refill: 3  - FU Early August as scheduled          Samira Whitney NP  [facility]  "

## 2018-07-11 ASSESSMENT — ANXIETY QUESTIONNAIRES: GAD7 TOTAL SCORE: 0

## 2018-07-11 ASSESSMENT — PATIENT HEALTH QUESTIONNAIRE - PHQ9: SUM OF ALL RESPONSES TO PHQ QUESTIONS 1-9: 0

## 2018-07-13 ENCOUNTER — TRANSFERRED RECORDS (OUTPATIENT)
Dept: HEALTH INFORMATION MANAGEMENT | Facility: CLINIC | Age: 48
End: 2018-07-13

## 2018-07-16 DIAGNOSIS — M25.572 ACUTE LEFT ANKLE PAIN: Primary | ICD-10-CM

## 2018-08-07 ENCOUNTER — OFFICE VISIT (OUTPATIENT)
Dept: FAMILY MEDICINE | Facility: OTHER | Age: 48
End: 2018-08-07
Attending: NURSE PRACTITIONER
Payer: COMMERCIAL

## 2018-08-07 VITALS
DIASTOLIC BLOOD PRESSURE: 74 MMHG | WEIGHT: 315 LBS | OXYGEN SATURATION: 98 % | HEIGHT: 75 IN | TEMPERATURE: 96.8 F | SYSTOLIC BLOOD PRESSURE: 118 MMHG | HEART RATE: 69 BPM | RESPIRATION RATE: 16 BRPM | BODY MASS INDEX: 39.17 KG/M2

## 2018-08-07 DIAGNOSIS — E11.9 TYPE 2 DIABETES MELLITUS WITHOUT COMPLICATION, WITHOUT LONG-TERM CURRENT USE OF INSULIN (H): ICD-10-CM

## 2018-08-07 DIAGNOSIS — I10 BENIGN ESSENTIAL HYPERTENSION: Primary | ICD-10-CM

## 2018-08-07 LAB
ALBUMIN SERPL-MCNC: 3.9 G/DL (ref 3.4–5)
ALBUMIN UR-MCNC: NEGATIVE MG/DL
ALP SERPL-CCNC: 66 U/L (ref 40–150)
ALT SERPL W P-5'-P-CCNC: 102 U/L (ref 0–70)
ANION GAP SERPL CALCULATED.3IONS-SCNC: 8 MMOL/L (ref 3–14)
APPEARANCE UR: CLEAR
AST SERPL W P-5'-P-CCNC: 48 U/L (ref 0–45)
BILIRUB SERPL-MCNC: 0.5 MG/DL (ref 0.2–1.3)
BILIRUB UR QL STRIP: NEGATIVE
BUN SERPL-MCNC: 14 MG/DL (ref 7–30)
CALCIUM SERPL-MCNC: 8.4 MG/DL (ref 8.5–10.1)
CHLORIDE SERPL-SCNC: 106 MMOL/L (ref 94–109)
CHOLEST SERPL-MCNC: 205 MG/DL
CO2 SERPL-SCNC: 25 MMOL/L (ref 20–32)
COLOR UR AUTO: YELLOW
CREAT SERPL-MCNC: 1.06 MG/DL (ref 0.66–1.25)
CREAT UR-MCNC: 188 MG/DL
EST. AVERAGE GLUCOSE BLD GHB EST-MCNC: 151 MG/DL
GFR SERPL CREATININE-BSD FRML MDRD: 75 ML/MIN/1.7M2
GLUCOSE SERPL-MCNC: 101 MG/DL (ref 70–99)
GLUCOSE UR STRIP-MCNC: NEGATIVE MG/DL
HBA1C MFR BLD: 6.9 % (ref 0–5.6)
HDLC SERPL-MCNC: 41 MG/DL
HGB UR QL STRIP: NEGATIVE
KETONES UR STRIP-MCNC: NEGATIVE MG/DL
LDLC SERPL CALC-MCNC: 138 MG/DL
LEUKOCYTE ESTERASE UR QL STRIP: NEGATIVE
MICROALBUMIN UR-MCNC: 11 MG/L
MICROALBUMIN/CREAT UR: 5.64 MG/G CR (ref 0–17)
NITRATE UR QL: NEGATIVE
NONHDLC SERPL-MCNC: 164 MG/DL
PH UR STRIP: 6 PH (ref 5–7)
POTASSIUM SERPL-SCNC: 3.8 MMOL/L (ref 3.4–5.3)
PROT SERPL-MCNC: 7.4 G/DL (ref 6.8–8.8)
SODIUM SERPL-SCNC: 139 MMOL/L (ref 133–144)
SOURCE: NORMAL
SP GR UR STRIP: 1.02 (ref 1–1.03)
TRIGL SERPL-MCNC: 131 MG/DL
TSH SERPL DL<=0.005 MIU/L-ACNC: 1.42 MU/L (ref 0.4–4)
UROBILINOGEN UR STRIP-ACNC: 1 EU/DL (ref 0.2–1)

## 2018-08-07 PROCEDURE — 83036 HEMOGLOBIN GLYCOSYLATED A1C: CPT | Performed by: NURSE PRACTITIONER

## 2018-08-07 PROCEDURE — 36415 COLL VENOUS BLD VENIPUNCTURE: CPT | Performed by: NURSE PRACTITIONER

## 2018-08-07 PROCEDURE — 99214 OFFICE O/P EST MOD 30 MIN: CPT | Performed by: NURSE PRACTITIONER

## 2018-08-07 PROCEDURE — 81003 URINALYSIS AUTO W/O SCOPE: CPT | Performed by: NURSE PRACTITIONER

## 2018-08-07 PROCEDURE — 84443 ASSAY THYROID STIM HORMONE: CPT | Performed by: NURSE PRACTITIONER

## 2018-08-07 PROCEDURE — 80061 LIPID PANEL: CPT | Performed by: NURSE PRACTITIONER

## 2018-08-07 PROCEDURE — 80053 COMPREHEN METABOLIC PANEL: CPT | Performed by: NURSE PRACTITIONER

## 2018-08-07 PROCEDURE — 82043 UR ALBUMIN QUANTITATIVE: CPT | Performed by: NURSE PRACTITIONER

## 2018-08-07 ASSESSMENT — ANXIETY QUESTIONNAIRES
5. BEING SO RESTLESS THAT IT IS HARD TO SIT STILL: NOT AT ALL
GAD7 TOTAL SCORE: 0
6. BECOMING EASILY ANNOYED OR IRRITABLE: NOT AT ALL
2. NOT BEING ABLE TO STOP OR CONTROL WORRYING: NOT AT ALL
3. WORRYING TOO MUCH ABOUT DIFFERENT THINGS: NOT AT ALL
4. TROUBLE RELAXING: NOT AT ALL
1. FEELING NERVOUS, ANXIOUS, OR ON EDGE: NOT AT ALL
7. FEELING AFRAID AS IF SOMETHING AWFUL MIGHT HAPPEN: NOT AT ALL

## 2018-08-07 ASSESSMENT — PAIN SCALES - GENERAL: PAINLEVEL: NO PAIN (0)

## 2018-08-07 NOTE — MR AVS SNAPSHOT
After Visit Summary   8/7/2018    Jason Duncan    MRN: [id number]           Patient Information     Date Of Birth          1970        Visit Information        Provider Department      8/7/2018 9:45 AM Samira Whitney NP [facility]        Today's Diagnoses     Benign essential hypertension    -  1    Type 2 diabetes mellitus without complication, without long-term current use of insulin (H)          Care Instructions        1. Benign essential hypertension  - Continue plan of care  - UA reflex to Microscopic  - Lipid Profile  - TSH with free T4 reflex  - Comprehensive metabolic panel    2. Type 2 diabetes mellitus without complication, without long-term current use of insulin (H)  - Continue plan of care  - UA reflex to Microscopic  - Albumin Random Urine Quantitative with Creat Ratio  - Hemoglobin A1c  - Lipid Profile  - TSH with free T4 reflex  - Comprehensive metabolic panel      FU 6 months      Samira Whitney NP  [facility]            Follow-ups after your visit        Who to contact     If you have questions or need follow up information about today's clinic visit or your schedule please contact [facility] directly at [phone].  Normal or non-critical lab and imaging results will be communicated to you by Mailanahart, letter or phone within 4 business days after the clinic has received the results. If you do not hear from us within 7 days, please contact the clinic through Mailanahart or phone. If you have a critical or abnormal lab result, we will notify you by phone as soon as possible.  Submit refill requests through PCA Audit or call your pharmacy and they will forward the refill request to us. Please allow 3 business days for your refill to be completed.          Additional Information About Your Visit        Mailanahart Information     PCA Audit gives you secure access to your electronic health record. If you see a primary care provider, you can also send  "messages to your care team and make appointments. If you have questions, please call your primary care clinic.  If you do not have a primary care provider, please call [phone] and they will assist you.        Care EveryWhere ID     This is your Care EveryWhere ID. This could be used by other organizations to access your [facility] medical records  [id number]        Your Vitals Were     Pulse Temperature Respirations Height Pulse Oximetry BMI (Body Mass Index)    69 96.8  F (36  C) (Tympanic) 16 6' 3\" (1.905 m) 98% 43.62 kg/m2       Blood Pressure from Last 3 Encounters:   08/07/18 118/74   07/10/18 128/72   04/17/18 128/78    Weight from Last 3 Encounters:   08/07/18 (!) 349 lb (158.3 kg)   07/10/18 (!) 359 lb (162.8 kg)   04/17/18 (!) 359 lb (162.8 kg)              We Performed the Following     *UA reflex to Microscopic     Albumin Random Urine Quantitative with Creat Ratio     Comprehensive metabolic panel     Hemoglobin A1c     Lipid Profile     TSH with free T4 reflex          Today's Medication Changes          These changes are accurate as of 8/7/18  9:59 AM.  If you have any questions, ask your nurse or doctor.               Stop taking these medicines if you haven't already. Please contact your care team if you have questions.     loratadine 10 MG tablet   Commonly known as:  CLARITIN   Stopped by:  Samira Whitney NP                    Primary Care Provider Office Phone # Fax #    Samira Whitney [phone] [address]  [place] [place] [address]        Equal Access to Services     [facility]: Hadii walt julian hadasho Soomaali, waaxda luqadaha, qaybta kaalmada carlos ramirez . So [facility] [phone].    ATENCIÓN: Si habla español, tiene a duenas disposición servicios gratuitos de asistencia lingüística. Llame al [phone].    We comply with applicable federal civil rights laws and [place] laws. We do not discriminate on the basis of race, " color, national origin, age, disability, sex, sexual orientation, or gender identity.            Thank you!     Thank you for choosing [facility]  for your care. Our goal is always to provide you with excellent care. Hearing back from our patients is one way we can continue to improve our services. Please take a few minutes to complete the written survey that you may receive in the mail after your visit with us. Thank you!             Your Updated Medication List - Protect others around you: Learn how to safely use, store and throw away your medicines at www.disposemymeds.org.          This list is accurate as of 8/7/18  9:59 AM.  Always use your most recent med list.                   Brand Name Dispense Instructions for use Diagnosis    amLODIPine 10 MG tablet    NORVASC    90 tablet    Take 1 tablet (10 mg) by mouth daily    Benign essential hypertension       aspirin 81 MG tablet      Take by mouth daily        cetirizine 10 MG tablet    zyrTEC    90 tablet    TAKE 1 TABLET BY MOUTH EVERY EVENING    Chronic seasonal allergic rhinitis, unspecified trigger       cyclobenzaprine 10 MG tablet    FLEXERIL    90 tablet    TAKE 1 TABLET BY MOUTH NIGHTLY AS NEEDED FOR MUSCLE SPASMS    Acute pain of right shoulder       lisinopril 10 MG tablet    PRINIVIL/ZESTRIL    90 tablet    Take 1 tablet (10 mg) by mouth daily    Benign essential hypertension       metFORMIN 500 MG 24 hr tablet    GLUCOPHAGE-XR    60 tablet    Take 1 tablet (500 mg) by mouth 2 times daily (with meals)    Type 2 diabetes mellitus without complication, without long-term current use of insulin (H)       montelukast 10 MG tablet    SINGULAIR    90 tablet    Take 1 tablet (10 mg) by mouth At Bedtime    Chronic seasonal allergic rhinitis, unspecified trigger       Multi-vitamin Tabs tablet      Take 1 tablet by mouth daily        order for DME     1 Device    c-pap mask and supplies  DX: G47.33, sleep apnea    Sleep apnea, unspecified type        VITAMIN D (CHOLECALCIFEROL) PO      Take 4,000 Units by mouth daily

## 2018-08-07 NOTE — NURSING NOTE
"Chief Complaint   Patient presents with     Diabetes     Hypertension       Initial /74  Pulse 69  Temp 96.8  F (36  C) (Tympanic)  Resp 16  Ht 6' 3\" (1.905 m)  Wt (!) 349 lb (158.3 kg)  SpO2 98%  BMI 43.62 kg/m2 Estimated body mass index is 43.62 kg/(m^2) as calculated from the following:    Height as of this encounter: 6' 3\" (1.905 m).    Weight as of this encounter: 349 lb (158.3 kg).  Medication Reconciliation: complete    Phoebe Agudelo LPN  "

## 2018-08-07 NOTE — PATIENT INSTRUCTIONS
1. Benign essential hypertension  - Continue plan of care  - UA reflex to Microscopic  - Lipid Profile  - TSH with free T4 reflex  - Comprehensive metabolic panel    2. Type 2 diabetes mellitus without complication, without long-term current use of insulin (H)  - Continue plan of care  - UA reflex to Microscopic  - Albumin Random Urine Quantitative with Creat Ratio  - Hemoglobin A1c  - Lipid Profile  - TSH with free T4 reflex  - Comprehensive metabolic panel      FU 6 months      Samira Whitney NP  [facility]

## 2018-08-07 NOTE — PROGRESS NOTES
A1C about the same  Continue to work on diabetic management  Work on lipids    Recheck 3 mos    [facility]  [phone]

## 2018-08-07 NOTE — PROGRESS NOTES
SUBJECTIVE:   Jason Duncan is a 48 year old male who presents to clinic today for the following health issues:        Diabetes Follow-up    Patient is checking blood sugars: not at all    Diabetic concerns: None     Symptoms of hypoglycemia (low blood sugar): none     Paresthesias (numbness or burning in feet) or sores: No     Date of last diabetic eye exam: 3-4 years ago      BP Readings from Last 2 Encounters:   08/07/18 118/74   07/10/18 128/72     Hemoglobin A1C (%)   Date Value   04/27/2018 7.0 (H)   12/10/2013 5.5     LDL Cholesterol Calculated (mg/dL)   Date Value   10/21/2015 105   12/10/2013 117       Diabetes Management Resources        Hypertension Follow-up    Outpatient blood pressures are not being checked.    Low Salt Diet: no added salt            Amount of exercise or physical activity: 6-7 days/week for an average of 30-45 minutes    Problems taking medications regularly: No    Medication side effects: none    Diet: regular (no restrictions)      Problem list and histories reviewed & adjusted, as indicated.  Additional history: as documented    Patient Active Problem List   Diagnosis     Benign essential hypertension     Mixed hearing loss, unilateral     ACP (advance care planning)     Morbid obesity due to excess calories (H)     RACHELE (obstructive sleep apnea)     Vitamin D deficiency     Type 2 diabetes mellitus without complication, without long-term current use of insulin (H)     Past Surgical History:   Procedure Laterality Date     GASTRIC RESTRICTIVE PROCEDURE, OPEN, REMOVE/REPLACE SUBCUTANEOUS PORT  2008     GI SURGERY  2015    removal of lap band      lap band  2008     nasal septoplasty       SEPTOPLASTY      Nasal       Social History   Substance Use Topics     Smoking status: Never Smoker     Smokeless tobacco: Never Used     Alcohol use Yes      Comment: rarely     Family History   Problem Relation Age of Onset     Dementia Mother 72     Cause of death     Diabetes Father       Respiratory Father      COPD     Cardiovascular Father      CHF         Current Outpatient Prescriptions   Medication Sig Dispense Refill     amLODIPine (NORVASC) 10 MG tablet Take 1 tablet (10 mg) by mouth daily 90 tablet 1     aspirin 81 MG tablet Take by mouth daily       cetirizine (ZYRTEC) 10 MG tablet TAKE 1 TABLET BY MOUTH EVERY EVENING 90 tablet 3     cyclobenzaprine (FLEXERIL) 10 MG tablet TAKE 1 TABLET BY MOUTH NIGHTLY AS NEEDED FOR MUSCLE SPASMS 90 tablet 3     lisinopril (PRINIVIL/ZESTRIL) 10 MG tablet Take 1 tablet (10 mg) by mouth daily 90 tablet 1     metFORMIN (GLUCOPHAGE-XR) 500 MG 24 hr tablet Take 1 tablet (500 mg) by mouth 2 times daily (with meals) 60 tablet 3     montelukast (SINGULAIR) 10 MG tablet Take 1 tablet (10 mg) by mouth At Bedtime 90 tablet 3     multivitamin, therapeutic with minerals (MULTI-VITAMIN) TABS Take 1 tablet by mouth daily       order for DME c-pap mask and supplies    DX: G47.33, sleep apnea 1 Device 11     VITAMIN D, CHOLECALCIFEROL, PO Take 4,000 Units by mouth daily       No Known Allergies  Recent Labs   Lab Test  04/27/18   1026  04/17/18   0919  01/10/17   1043  10/21/15   0925   12/10/13   0810   A1C  7.0*   --    --    --    --   5.5   LDL   --    --    --   105   --   117   HDL   --    --    --   51   --   48   TRIG   --    --    --   147   --   85   ALT   --    --   83*  54   --   26   CR   --   1.04  1.05  0.96   < >  1.19   GFRESTIMATED   --   77  76  85   < >  67   GFRESTBLACK   --   >90  >90  African American GFR Calc    >90   GFR Calc     < >  82   POTASSIUM   --   3.9  3.6  4.3   < >  3.8   TSH   --   1.66   --    --    --    --     < > = values in this interval not displayed.      BP Readings from Last 3 Encounters:   08/07/18 118/74   07/10/18 128/72   04/17/18 128/78    Wt Readings from Last 3 Encounters:   08/07/18 (!) 349 lb (158.3 kg)   07/10/18 (!) 359 lb (162.8 kg)   04/17/18 (!) 359 lb (162.8 kg)                  Labs reviewed in  "EPIC    Reviewed and updated as needed this visit by clinical staff  Tobacco  Allergies  Meds  Problems  Med Hx  Surg Hx  Fam Hx  Soc Hx        Reviewed and updated as needed this visit by Provider         ROS:  Constitutional, HEENT, cardiovascular, pulmonary, GI, , musculoskeletal, neuro, skin, endocrine and psych systems are negative, except as otherwise noted.    OBJECTIVE:     /74  Pulse 69  Temp 96.8  F (36  C) (Tympanic)  Resp 16  Ht 6' 3\" (1.905 m)  Wt (!) 349 lb (158.3 kg)  SpO2 98%  BMI 43.62 kg/m2  Body mass index is 43.62 kg/(m^2).     GENERAL: healthy, alert and no distress  EYES: Eyes grossly normal to inspection, PERRL and conjunctivae and sclerae normal  HENT: ear canals and TM's normal, nose and mouth without ulcers or lesions  NECK: no adenopathy, no asymmetry, masses, or scars and thyroid normal to palpation  RESP: lungs clear to auscultation - no rales, rhonchi or wheezes  CV: regular rate and rhythm, normal S1 S2, no S3 or S4, no murmur, click or rub, no peripheral edema and peripheral pulses strong  MS: no gross musculoskeletal defects noted, no edema  SKIN: no suspicious lesions or rashes  PSYCH: mentation appears normal, affect normal/bright      ASSESSMENT/PLAN:     Diabetes Type II, A1c Controlled, non-insulin dependent   Associated with the following complications    Renal Complications:  None    Ophthalmologic Complications: None    Neurologic Complications: None    Peripheral Vascular Complications:  None    Other: None   Plan:  No changes in the patient's current treatment plan      Hypertension; controlled   Associated with the following complications:    None   Plan:  No changes in the patient's current treatment plan          1. Benign essential hypertension  - Continue plan of care  - UA reflex to Microscopic  - Lipid Profile  - TSH with free T4 reflex  - Comprehensive metabolic panel    2. Type 2 diabetes mellitus without complication, without long-term " current use of insulin (H)  - Continue plan of care  - UA reflex to Microscopic  - Albumin Random Urine Quantitative with Creat Ratio  - Hemoglobin A1c  - Lipid Profile  - TSH with free T4 reflex  - Comprehensive metabolic panel      FU 6 months      Samira Whitney NP  [facility]

## 2018-08-08 ASSESSMENT — ANXIETY QUESTIONNAIRES: GAD7 TOTAL SCORE: 0

## 2018-08-08 ASSESSMENT — PATIENT HEALTH QUESTIONNAIRE - PHQ9: SUM OF ALL RESPONSES TO PHQ QUESTIONS 1-9: 0

## 2018-08-20 ENCOUNTER — TRANSFERRED RECORDS (OUTPATIENT)
Dept: HEALTH INFORMATION MANAGEMENT | Facility: CLINIC | Age: 48
End: 2018-08-20

## 2018-11-06 ENCOUNTER — OFFICE VISIT (OUTPATIENT)
Dept: FAMILY MEDICINE | Facility: OTHER | Age: 48
End: 2018-11-06
Attending: NURSE PRACTITIONER
Payer: COMMERCIAL

## 2018-11-06 ENCOUNTER — RADIANT APPOINTMENT (OUTPATIENT)
Dept: GENERAL RADIOLOGY | Facility: OTHER | Age: 48
End: 2018-11-06
Attending: NURSE PRACTITIONER
Payer: COMMERCIAL

## 2018-11-06 VITALS
RESPIRATION RATE: 14 BRPM | HEART RATE: 80 BPM | WEIGHT: 315 LBS | SYSTOLIC BLOOD PRESSURE: 120 MMHG | HEIGHT: 75 IN | BODY MASS INDEX: 39.17 KG/M2 | DIASTOLIC BLOOD PRESSURE: 70 MMHG | TEMPERATURE: 97.6 F

## 2018-11-06 DIAGNOSIS — M25.522 LEFT ELBOW PAIN: Primary | ICD-10-CM

## 2018-11-06 DIAGNOSIS — M77.8 LEFT ELBOW TENDONITIS: ICD-10-CM

## 2018-11-06 DIAGNOSIS — M25.522 LEFT ELBOW PAIN: ICD-10-CM

## 2018-11-06 PROCEDURE — 99213 OFFICE O/P EST LOW 20 MIN: CPT | Performed by: NURSE PRACTITIONER

## 2018-11-06 PROCEDURE — 73070 X-RAY EXAM OF ELBOW: CPT | Mod: TC

## 2018-11-06 ASSESSMENT — ANXIETY QUESTIONNAIRES
2. NOT BEING ABLE TO STOP OR CONTROL WORRYING: NOT AT ALL
7. FEELING AFRAID AS IF SOMETHING AWFUL MIGHT HAPPEN: NOT AT ALL
IF YOU CHECKED OFF ANY PROBLEMS ON THIS QUESTIONNAIRE, HOW DIFFICULT HAVE THESE PROBLEMS MADE IT FOR YOU TO DO YOUR WORK, TAKE CARE OF THINGS AT HOME, OR GET ALONG WITH OTHER PEOPLE: NOT DIFFICULT AT ALL
5. BEING SO RESTLESS THAT IT IS HARD TO SIT STILL: NOT AT ALL
3. WORRYING TOO MUCH ABOUT DIFFERENT THINGS: NOT AT ALL
6. BECOMING EASILY ANNOYED OR IRRITABLE: NOT AT ALL
4. TROUBLE RELAXING: NOT AT ALL
1. FEELING NERVOUS, ANXIOUS, OR ON EDGE: NOT AT ALL
GAD7 TOTAL SCORE: 0

## 2018-11-06 ASSESSMENT — PATIENT HEALTH QUESTIONNAIRE - PHQ9: SUM OF ALL RESPONSES TO PHQ QUESTIONS 1-9: 0

## 2018-11-06 ASSESSMENT — PAIN SCALES - GENERAL: PAINLEVEL: MODERATE PAIN (4)

## 2018-11-06 NOTE — MR AVS SNAPSHOT
After Visit Summary   11/6/2018    Jason Duncan    MRN: [id number]           Patient Information     Date Of Birth          1970        Visit Information        Provider Department      11/6/2018 1:45 PM Samira Whitney NP [facility]        Today's Diagnoses     Left elbow pain    -  1    Left elbow tendonitis          Care Instructions        ASSESSMENT/PLAN:       1. Left elbow pain  - XR ELBOW LT 2 VW (Clinic Performed); Future  - ORTHOPEDICS ADULT REFERRAL    2. Left elbow tendonitis  - XR ELBOW LT 2 VW (Clinic Performed); Future  - ORTHOPEDICS ADULT REFERRAL      Ibuprofen PRN  Ice Avoid overuse        Samira Whitney NP  [facility]            Follow-ups after your visit        Additional Services     ORTHOPEDICS ADULT REFERRAL       Your provider has referred you to: [facility] - Friday    Please be aware that coverage of these services is subject to the terms and limitations of your health insurance plan.  Call member services at your health plan with any benefit or coverage questions.      Please bring the following to your appointment:    >>   Any x-rays, CTs or MRIs which have been performed.  Contact the facility where they were done to arrange for  prior to your scheduled appointment.    >>   List of current medications   >>   This referral request   >>   Any documents/labs given to you for this referral                  Your next 10 appointments already scheduled     Nov 06, 2018  1:45 PM CST   (Arrive by 1:30 PM)   SHORT with Samira Whitney NP   [facility]    [address]  [facility] [address]   [phone]              Future tests that were ordered for you today     Open Future Orders        Priority Expected Expires Ordered    XR ELBOW LT 2 VW (Clinic Performed) Routine 11/6/2018 11/6/2019 11/6/2018            Who to contact     If you have questions or  "need follow up information about today's clinic visit or your schedule please contact [facility] directly at [phone].  Normal or non-critical lab and imaging results will be communicated to you by MyChart, letter or phone within 4 business days after the clinic has received the results. If you do not hear from us within 7 days, please contact the clinic through MyChart or phone. If you have a critical or abnormal lab result, we will notify you by phone as soon as possible.  Submit refill requests through [facility] or call your pharmacy and they will forward the refill request to us. Please allow 3 business days for your refill to be completed.          Additional Information About Your Visit        [employer] Information     [employer] gives you secure access to your electronic health record. If you see a primary care provider, you can also send messages to your care team and make appointments. If you have questions, please call your primary care clinic.  If you do not have a primary care provider, please call [phone] and they will assist you.        Care EveryWhere ID     This is your Care EveryWhere ID. This could be used by other organizations to access your [place] medical records  [id number]        Your Vitals Were     Pulse Temperature Respirations Height BMI (Body Mass Index)       80 97.6  F (36.4  C) 14 6' 3\" (1.905 m) 43.87 kg/m2        Blood Pressure from Last 3 Encounters:   11/06/18 120/70   08/07/18 118/74   07/10/18 128/72    Weight from Last 3 Encounters:   11/06/18 (!) 351 lb (159.2 kg)   08/07/18 (!) 349 lb (158.3 kg)   07/10/18 (!) 359 lb (162.8 kg)              We Performed the Following     ORTHOPEDICS ADULT REFERRAL        Primary Care Provider Office Phone # Fax #    Samira Whitney [phone] [address] [place] [address]        Equal Access to Services     AWAIS RODRÍGUEZ AH: Caryn carney Somaldonado, waaxda luqadaha, qaybta " carlos malonearmida manzo ah. So [facility] [phone].    ATENCIÓN: Si jerad espana, tiene a duenas disposición servicios gratuitos de asistencia lingüística. Jed al [phone].    We comply with applicable federal civil rights laws and [place] laws. We do not discriminate on the basis of race, color, national origin, age, disability, sex, sexual orientation, or gender identity.            Thank you!     Thank you for choosing [facility]  for your care. Our goal is always to provide you with excellent care. Hearing back from our patients is one way we can continue to improve our services. Please take a few minutes to complete the written survey that you may receive in the mail after your visit with us. Thank you!             Your Updated Medication List - Protect others around you: Learn how to safely use, store and throw away your medicines at www.disposemymeds.org.          This list is accurate as of 11/6/18  1:39 PM.  Always use your most recent med list.                   Brand Name Dispense Instructions for use Diagnosis    amLODIPine 10 MG tablet    NORVASC    90 tablet    Take 1 tablet (10 mg) by mouth daily    Benign essential hypertension       aspirin 81 MG tablet      Take by mouth daily        cetirizine 10 MG tablet    zyrTEC    90 tablet    TAKE 1 TABLET BY MOUTH EVERY EVENING    Chronic seasonal allergic rhinitis, unspecified trigger       cyclobenzaprine 10 MG tablet    FLEXERIL    90 tablet    TAKE 1 TABLET BY MOUTH NIGHTLY AS NEEDED FOR MUSCLE SPASMS    Acute pain of right shoulder       lisinopril 10 MG tablet    PRINIVIL/ZESTRIL    90 tablet    Take 1 tablet (10 mg) by mouth daily    Benign essential hypertension       metFORMIN 500 MG 24 hr tablet    GLUCOPHAGE-XR    60 tablet    Take 1 tablet (500 mg) by mouth 2 times daily (with meals)    Type 2 diabetes mellitus without complication, without long-term current use of insulin (H)        montelukast 10 MG tablet    SINGULAIR    90 tablet    Take 1 tablet (10 mg) by mouth At Bedtime    Chronic seasonal allergic rhinitis, unspecified trigger       Multi-vitamin Tabs tablet      Take 1 tablet by mouth daily        order for DME     1 Device    c-pap mask and supplies  DX: G47.33, sleep apnea    Sleep apnea, unspecified type       VITAMIN D (CHOLECALCIFEROL) PO      Take 5,000 Units by mouth daily

## 2018-11-06 NOTE — PATIENT INSTRUCTIONS
ASSESSMENT/PLAN:       1. Left elbow pain  - XR ELBOW LT 2 VW (Clinic Performed); Future  - ORTHOPEDICS ADULT REFERRAL    2. Left elbow tendonitis  - XR ELBOW LT 2 VW (Clinic Performed); Future  - ORTHOPEDICS ADULT REFERRAL      Ibuprofen PRN  Ice Avoid overuse        Samira Whitney NP  [facility]

## 2018-11-06 NOTE — NURSING NOTE
"Chief Complaint   Patient presents with     Elbow Pain       Initial /70 (BP Location: Right arm, Patient Position: Sitting, Cuff Size: Adult Large)  Pulse 80  Temp 97.6  F (36.4  C)  Resp 14  Ht 6' 3\" (1.905 m)  Wt (!) 351 lb (159.2 kg)  BMI 43.87 kg/m2 Estimated body mass index is 43.87 kg/(m^2) as calculated from the following:    Height as of this encounter: 6' 3\" (1.905 m).    Weight as of this encounter: 351 lb (159.2 kg).  Medication Reconciliation: complete    Lexis Rico LPN    "

## 2018-11-06 NOTE — PROGRESS NOTES
SUBJECTIVE:   Jason Duncan is a 48 year old male who presents to clinic today for the following health issues:            Left Elbow Pain - arm is weak due to elbow pain.  No injury or notable cause.    Onset: 2 weeks    Description:   Location: left elbow  Character: Sharp and Dull ache    Intensity: moderate    Progression of Symptoms: same, intermittent    Accompanying Signs & Symptoms:  Other symptoms: weekness    History:   Previous similar pain: no       Precipitating factors:   Trauma or overuse: no     Alleviating factors:  Improved by: nothing  Therapies Tried and outcome: Ibuprofen, no help          Problem list and histories reviewed & adjusted, as indicated.  Additional history: as documented    Patient Active Problem List   Diagnosis     Benign essential hypertension     Mixed hearing loss, unilateral     ACP (advance care planning)     Morbid obesity due to excess calories (H)     RACHELE (obstructive sleep apnea)     Vitamin D deficiency     Type 2 diabetes mellitus without complication, without long-term current use of insulin (H)     Gastric band slippage     GERD (gastroesophageal reflux disease)     Status post bariatric surgery     Past Surgical History:   Procedure Laterality Date     GASTRIC RESTRICTIVE PROCEDURE, OPEN, REMOVE/REPLACE SUBCUTANEOUS PORT  2008     GI SURGERY  2015    removal of lap band      lap band  2008     nasal septoplasty       SEPTOPLASTY      Nasal       Social History   Substance Use Topics     Smoking status: Never Smoker     Smokeless tobacco: Never Used     Alcohol use Yes      Comment: rarely     Family History   Problem Relation Age of Onset     Dementia Mother 72     Cause of death     Diabetes Father      Respiratory Father      COPD     Cardiovascular Father      CHF         Current Outpatient Prescriptions   Medication Sig Dispense Refill     amLODIPine (NORVASC) 10 MG tablet Take 1 tablet (10 mg) by mouth daily 90 tablet 1     aspirin 81 MG tablet Take by mouth  daily       cetirizine (ZYRTEC) 10 MG tablet TAKE 1 TABLET BY MOUTH EVERY EVENING 90 tablet 3     cyclobenzaprine (FLEXERIL) 10 MG tablet TAKE 1 TABLET BY MOUTH NIGHTLY AS NEEDED FOR MUSCLE SPASMS 90 tablet 3     lisinopril (PRINIVIL/ZESTRIL) 10 MG tablet Take 1 tablet (10 mg) by mouth daily 90 tablet 1     metFORMIN (GLUCOPHAGE-XR) 500 MG 24 hr tablet Take 1 tablet (500 mg) by mouth 2 times daily (with meals) 60 tablet 3     montelukast (SINGULAIR) 10 MG tablet Take 1 tablet (10 mg) by mouth At Bedtime 90 tablet 3     multivitamin, therapeutic with minerals (MULTI-VITAMIN) TABS Take 1 tablet by mouth daily       order for DME c-pap mask and supplies    DX: G47.33, sleep apnea 1 Device 11     VITAMIN D, CHOLECALCIFEROL, PO Take 5,000 Units by mouth daily        No Known Allergies  Recent Labs   Lab Test  08/07/18   0940  04/27/18   1026  04/17/18   0919  01/10/17   1043  10/21/15   0925   12/10/13   0810   A1C  6.9*  7.0*   --    --    --    --   5.5   LDL  138*   --    --    --   105   --   117   HDL  41   --    --    --   51   --   48   TRIG  131   --    --    --   147   --   85   ALT  102*   --    --   83*  54   --   26   CR  1.06   --   1.04  1.05  0.96   < >  1.19   GFRESTIMATED  75   --   77  76  85   < >  67   GFRESTBLACK  >90   --   >90  >90  African American GFR Calc    >90   GFR Calc     < >  82   POTASSIUM  3.8   --   3.9  3.6  4.3   < >  3.8   TSH  1.42   --   1.66   --    --    --    --     < > = values in this interval not displayed.      BP Readings from Last 3 Encounters:   11/06/18 120/70   08/07/18 118/74   07/10/18 128/72    Wt Readings from Last 3 Encounters:   11/06/18 (!) 351 lb (159.2 kg)   08/07/18 (!) 349 lb (158.3 kg)   07/10/18 (!) 359 lb (162.8 kg)                  Labs reviewed in EPIC    Reviewed and updated as needed this visit by clinical staff  Tobacco  Allergies  Meds  Med Hx  Surg Hx  Fam Hx  Soc Hx      Reviewed and updated as needed this visit by  "Provider         ROS:  Constitutional, HEENT, cardiovascular, pulmonary, gi and gu systems are negative, except as otherwise noted.    OBJECTIVE:     /70 (BP Location: Right arm, Patient Position: Sitting, Cuff Size: Adult Large)  Pulse 80  Temp 97.6  F (36.4  C)  Resp 14  Ht 6' 3\" (1.905 m)  Wt (!) 351 lb (159.2 kg)  BMI 43.87 kg/m2  Body mass index is 43.87 kg/(m^2).       GENERAL: healthy, alert and no distress  EYES: Eyes grossly normal to inspection, PERRL and conjunctivae and sclerae normal  NECK: no adenopathy, no asymmetry, masses, or scars and thyroid normal to palpation  RESP: lungs clear to auscultation - no rales, rhonchi or wheezes  CV: regular rate and rhythm, normal S1 S2, no S3 or S4, no murmur, click or rub, no peripheral edema and peripheral pulses strong  MS: Left arm - tender along medial and lateral aspect.  Mild swelling.  SKIN: no suspicious lesions or rashes  PSYCH: mentation appears normal, affect normal/bright        PROCEDURE:  XR ELBOW LT 2 VW     HISTORY: left elbow pain; Left elbow pain; Left elbow tendonitis     COMPARISON:  None.     TECHNIQUE:  2 views of the left elbow were obtained.     FINDINGS:  No fracture or dislocation is identified. The joint spaces  are preserved.           IMPRESSION: Normal left elbow       KINGA GONZALEZ MD        ASSESSMENT/PLAN:       1. Left elbow pain  - XR ELBOW LT 2 VW (Clinic Performed); Future  - ORTHOPEDICS ADULT REFERRAL    2. Left elbow tendonitis  - XR ELBOW LT 2 VW (Clinic Performed); Future  - ORTHOPEDICS ADULT REFERRAL      Ibuprofen PRN  Ice Avoid overuse        Samira Whitney NP  [facility]  "

## 2018-11-07 ASSESSMENT — ANXIETY QUESTIONNAIRES: GAD7 TOTAL SCORE: 0

## 2018-11-24 DIAGNOSIS — E11.9 TYPE 2 DIABETES MELLITUS WITHOUT COMPLICATION, WITHOUT LONG-TERM CURRENT USE OF INSULIN (H): ICD-10-CM

## 2018-11-26 RX ORDER — METFORMIN HCL 500 MG
TABLET, EXTENDED RELEASE 24 HR ORAL
Qty: 60 TABLET | Refills: 1 | Status: SHIPPED | OUTPATIENT
Start: 2018-11-26 | End: 2019-02-04

## 2019-01-14 ENCOUNTER — TELEPHONE (OUTPATIENT)
Dept: FAMILY MEDICINE | Facility: OTHER | Age: 49
End: 2019-01-14

## 2019-01-14 DIAGNOSIS — F51.01 PRIMARY INSOMNIA: Primary | ICD-10-CM

## 2019-01-14 RX ORDER — TRAZODONE HYDROCHLORIDE 50 MG/1
TABLET, FILM COATED ORAL
Qty: 60 TABLET | Refills: 0 | Status: SHIPPED | OUTPATIENT
Start: 2019-01-14 | End: 2019-09-16

## 2019-01-14 NOTE — TELEPHONE ENCOUNTER
Pt calls, hasn't slept for a few days, has tried OTC remedies, Benadryl, Unisom and some ZZZ???, nothing seems to work.  To 'ever [place]

## 2019-01-15 NOTE — TELEPHONE ENCOUNTER
Signed Trazodone Rx  Last diabetic visit was 8/2018  Pls schedule for February    [facility]  [phone]

## 2019-02-04 DIAGNOSIS — E11.9 TYPE 2 DIABETES MELLITUS WITHOUT COMPLICATION, WITHOUT LONG-TERM CURRENT USE OF INSULIN (H): ICD-10-CM

## 2019-02-05 RX ORDER — METFORMIN HCL 500 MG
TABLET, EXTENDED RELEASE 24 HR ORAL
Qty: 60 TABLET | Refills: 1 | Status: SHIPPED | OUTPATIENT
Start: 2019-02-05 | End: 2019-02-11

## 2019-02-06 NOTE — PROGRESS NOTES
SUBJECTIVE:   Jason Duncan is a 48 year old male who presents to clinic today for the following health isues      Diabetes Follow-up    Patient is checking blood sugars: not at all    Diabetic concerns: None     Symptoms of hypoglycemia (low blood sugar): unknown     Paresthesias (numbness or burning in feet) or sores: No     Date of last diabetic eye exam: 2013        BP Readings from Last 2 Encounters:   02/08/19 120/76   11/06/18 120/70     Hemoglobin A1C (%)   Date Value   08/07/2018 6.9 (H)   04/27/2018 7.0 (H)     LDL Cholesterol Calculated (mg/dL)   Date Value   08/07/2018 138 (H)   10/21/2015 105     Diabetes Management Resources        Hypertension Follow-up    Outpatient blood pressures are not being checked.    Low Salt Diet: no added salt      Insomnia  - Stable on trazodone          Amount of exercise or physical activity: None    Problems taking medications regularly: No    Medication side effects: none    Diet: low salt        Problem list and histories reviewed & adjusted, as indicated.  Additional history: as documented    Patient Active Problem List   Diagnosis     Benign essential hypertension     Mixed hearing loss, unilateral     ACP (advance care planning)     Morbid obesity due to excess calories (H)     RACHELE (obstructive sleep apnea)     Vitamin D deficiency     Type 2 diabetes mellitus without complication, without long-term current use of insulin (H)     Gastric band slippage     GERD (gastroesophageal reflux disease)     Status post bariatric surgery     Past Surgical History:   Procedure Laterality Date     GASTRIC RESTRICTIVE PROCEDURE, OPEN, REMOVE/REPLACE SUBCUTANEOUS PORT  2008     GI SURGERY  2015    removal of lap band      lap band  2008     nasal septoplasty       SEPTOPLASTY      Nasal       Social History     Tobacco Use     Smoking status: Never Smoker     Smokeless tobacco: Never Used   Substance Use Topics     Alcohol use: Yes     Comment: rarely     Family History    Problem Relation Age of Onset     Dementia Mother 72        Cause of death     Diabetes Father      Respiratory Father         COPD     Cardiovascular Father         CHF         Current Outpatient Medications   Medication Sig Dispense Refill     amLODIPine (NORVASC) 10 MG tablet Take 1 tablet (10 mg) by mouth daily 90 tablet 1     aspirin 81 MG tablet Take by mouth daily       cetirizine (ZYRTEC) 10 MG tablet TAKE 1 TABLET BY MOUTH EVERY EVENING 90 tablet 3     cyclobenzaprine (FLEXERIL) 10 MG tablet TAKE 1 TABLET BY MOUTH NIGHTLY AS NEEDED FOR MUSCLE SPASMS 90 tablet 3     lisinopril (PRINIVIL/ZESTRIL) 10 MG tablet Take 1 tablet (10 mg) by mouth daily 90 tablet 1     metFORMIN (GLUCOPHAGE-XR) 500 MG 24 hr tablet TAKE 1 TABLET BY MOUTH TWICE DAILY WITH MEALS 60 tablet 1     montelukast (SINGULAIR) 10 MG tablet Take 1 tablet (10 mg) by mouth At Bedtime 90 tablet 3     multivitamin, therapeutic with minerals (MULTI-VITAMIN) TABS Take 1 tablet by mouth daily       order for DME c-pap mask and supplies    DX: G47.33, sleep apnea 1 Device 11     traZODone (DESYREL) 50 MG tablet 1-2 po hs PRN 60 tablet 0     VITAMIN D, CHOLECALCIFEROL, PO Take 5,000 Units by mouth daily        No Known Allergies  Recent Labs   Lab Test 08/07/18  0940 04/27/18  1026 04/17/18  0919 01/10/17  1043 10/21/15  0925  12/10/13  0810   A1C 6.9* 7.0*  --   --   --   --  5.5   *  --   --   --  105  --  117   HDL 41  --   --   --  51  --  48   TRIG 131  --   --   --  147  --  85   *  --   --  83* 54  --  26   CR 1.06  --  1.04 1.05 0.96   < > 1.19   GFRESTIMATED 75  --  77 76 85   < > 67   GFRESTBLACK >90  --  >90 >90   GFR Calc   >90   GFR Calc     < > 82   POTASSIUM 3.8  --  3.9 3.6 4.3   < > 3.8   TSH 1.42  --  1.66  --   --   --   --     < > = values in this interval not displayed.      BP Readings from Last 3 Encounters:   02/08/19 120/76   11/06/18 120/70   08/07/18 118/74    Wt Readings from Last  3 Encounters:   02/08/19 (!) 160.1 kg (353 lb)   11/06/18 (!) 159.2 kg (351 lb)   08/07/18 (!) 158.3 kg (349 lb)                  Labs reviewed in EPIC    Reviewed and updated as needed this visit by clinical staff  Tobacco  Allergies       Reviewed and updated as needed this visit by Provider  Tobacco         ROS:  Constitutional, HEENT, cardiovascular, pulmonary, GI, , musculoskeletal, neuro, skin, endocrine and psych systems are negative, except as otherwise noted.    OBJECTIVE:     /76 (BP Location: Right arm, Patient Position: Sitting, Cuff Size: Adult Large)   Pulse 64   Resp 14   Wt (!) 160.1 kg (353 lb)   BMI 44.12 kg/m    Body mass index is 44.12 kg/m .       GENERAL: healthy, alert and no distress  EYES: Eyes grossly normal to inspection, PERRL and conjunctivae and sclerae normal  HENT: ear canals and TM's normal, nose and mouth without ulcers or lesions  NECK: no adenopathy, no asymmetry, masses, or scars and thyroid normal to palpation  RESP: lungs clear to auscultation - no rales, rhonchi or wheezes  CV: regular rate and rhythm, normal S1 S2, no S3 or S4, no murmur, click or rub, no peripheral edema and peripheral pulses strong  MS: no gross musculoskeletal defects noted, no edema  SKIN: no suspicious lesions or rashes      Results for orders placed or performed in visit on 02/08/19   *UA reflex to Microscopic   Result Value Ref Range    Color Urine Yellow     Appearance Urine Clear     Glucose Urine Negative NEG^Negative mg/dL    Bilirubin Urine Negative NEG^Negative    Ketones Urine Negative NEG^Negative mg/dL    Specific Gravity Urine 1.020 1.003 - 1.035    Blood Urine Negative NEG^Negative    pH Urine 6.5 5.0 - 7.0 pH    Protein Albumin Urine Negative NEG^Negative mg/dL    Urobilinogen Urine 0.2 0.2 - 1.0 EU/dL    Nitrite Urine Negative NEG^Negative    Leukocyte Esterase Urine Negative NEG^Negative    Source Midstream Urine    Albumin Random Urine Quantitative with Creat Ratio    Result Value Ref Range    Creatinine Urine 140 mg/dL    Albumin Urine mg/L 7 mg/L    Albumin Urine mg/g Cr 5.24 0 - 17 mg/g Cr   Hemoglobin A1c   Result Value Ref Range    Hemoglobin A1C 6.8 (H) 0 - 5.6 %   Lipid Profile   Result Value Ref Range    Cholesterol 212 (H) <200 mg/dL    Triglycerides 123 <150 mg/dL    HDL Cholesterol 42 >39 mg/dL    LDL Cholesterol Calculated 145 (H) <100 mg/dL    Non HDL Cholesterol 170 (H) <130 mg/dL   TSH with free T4 reflex   Result Value Ref Range    TSH 1.08 0.40 - 4.00 mU/L   Comprehensive metabolic panel (BMP + Alb, Alk Phos, ALT, AST, Total. Bili, TP)   Result Value Ref Range    Sodium 140 133 - 144 mmol/L    Potassium 4.4 3.4 - 5.3 mmol/L    Chloride 106 94 - 109 mmol/L    Carbon Dioxide 26 20 - 32 mmol/L    Anion Gap 8 3 - 14 mmol/L    Glucose 115 (H) 70 - 99 mg/dL    Urea Nitrogen 14 7 - 30 mg/dL    Creatinine 1.08 0.66 - 1.25 mg/dL    GFR Estimate 81 >60 mL/min/[1.73_m2]    GFR Estimate If Black >90 >60 mL/min/[1.73_m2]    Calcium 9.0 8.5 - 10.1 mg/dL    Bilirubin Total 0.6 0.2 - 1.3 mg/dL    Albumin 3.8 3.4 - 5.0 g/dL    Protein Total 7.4 6.8 - 8.8 g/dL    Alkaline Phosphatase 61 40 - 150 U/L    ALT 97 (H) 0 - 70 U/L    AST 53 (H) 0 - 45 U/L   Estimated Average Glucose   Result Value Ref Range    Estimated Average Glucose 148 mg/dL       ASSESSMENT/PLAN:     1. Type 2 diabetes mellitus without complication, without long-term current use of insulin (H)  - UA reflex to Microscopic  - Albumin Random Urine Quantitative with Creat Ratio  - Hemoglobin A1c  - Lipid Profile  - Comprehensive metabolic panel (BMP + Alb, Alk Phos, ALT, AST, Total. Bili, TP)    2. Benign essential hypertension  - Lipid Profile  - TSH with free T4 reflex  - Comprehensive metabolic panel (BMP + Alb, Alk Phos, ALT, AST, Total. Bili, TP)    3. RACHELE (obstructive sleep apnea)  - order for DME; c-pap mask and supplies  DX: G47.33, sleep apnea  Dispense: 1 Device; Refill: 11        ELAN Francois  [facility]

## 2019-02-08 ENCOUNTER — OFFICE VISIT (OUTPATIENT)
Dept: FAMILY MEDICINE | Facility: OTHER | Age: 49
End: 2019-02-08
Attending: NURSE PRACTITIONER
Payer: COMMERCIAL

## 2019-02-08 VITALS
WEIGHT: 315 LBS | HEART RATE: 64 BPM | RESPIRATION RATE: 14 BRPM | DIASTOLIC BLOOD PRESSURE: 76 MMHG | SYSTOLIC BLOOD PRESSURE: 120 MMHG | BODY MASS INDEX: 44.12 KG/M2

## 2019-02-08 DIAGNOSIS — E11.9 TYPE 2 DIABETES MELLITUS WITHOUT COMPLICATION, WITHOUT LONG-TERM CURRENT USE OF INSULIN (H): Primary | ICD-10-CM

## 2019-02-08 DIAGNOSIS — I10 BENIGN ESSENTIAL HYPERTENSION: ICD-10-CM

## 2019-02-08 DIAGNOSIS — G47.33 OSA (OBSTRUCTIVE SLEEP APNEA): ICD-10-CM

## 2019-02-08 LAB
ALBUMIN SERPL-MCNC: 3.8 G/DL (ref 3.4–5)
ALBUMIN UR-MCNC: NEGATIVE MG/DL
ALP SERPL-CCNC: 61 U/L (ref 40–150)
ALT SERPL W P-5'-P-CCNC: 97 U/L (ref 0–70)
ANION GAP SERPL CALCULATED.3IONS-SCNC: 8 MMOL/L (ref 3–14)
APPEARANCE UR: CLEAR
AST SERPL W P-5'-P-CCNC: 53 U/L (ref 0–45)
BILIRUB SERPL-MCNC: 0.6 MG/DL (ref 0.2–1.3)
BILIRUB UR QL STRIP: NEGATIVE
BUN SERPL-MCNC: 14 MG/DL (ref 7–30)
CALCIUM SERPL-MCNC: 9 MG/DL (ref 8.5–10.1)
CHLORIDE SERPL-SCNC: 106 MMOL/L (ref 94–109)
CHOLEST SERPL-MCNC: 212 MG/DL
CO2 SERPL-SCNC: 26 MMOL/L (ref 20–32)
COLOR UR AUTO: YELLOW
CREAT SERPL-MCNC: 1.08 MG/DL (ref 0.66–1.25)
CREAT UR-MCNC: 140 MG/DL
EST. AVERAGE GLUCOSE BLD GHB EST-MCNC: 148 MG/DL
GFR SERPL CREATININE-BSD FRML MDRD: 81 ML/MIN/{1.73_M2}
GLUCOSE SERPL-MCNC: 115 MG/DL (ref 70–99)
GLUCOSE UR STRIP-MCNC: NEGATIVE MG/DL
HBA1C MFR BLD: 6.8 % (ref 0–5.6)
HDLC SERPL-MCNC: 42 MG/DL
HGB UR QL STRIP: NEGATIVE
KETONES UR STRIP-MCNC: NEGATIVE MG/DL
LDLC SERPL CALC-MCNC: 145 MG/DL
LEUKOCYTE ESTERASE UR QL STRIP: NEGATIVE
MICROALBUMIN UR-MCNC: 7 MG/L
MICROALBUMIN/CREAT UR: 5.24 MG/G CR (ref 0–17)
NITRATE UR QL: NEGATIVE
NONHDLC SERPL-MCNC: 170 MG/DL
PH UR STRIP: 6.5 PH (ref 5–7)
POTASSIUM SERPL-SCNC: 4.4 MMOL/L (ref 3.4–5.3)
PROT SERPL-MCNC: 7.4 G/DL (ref 6.8–8.8)
SODIUM SERPL-SCNC: 140 MMOL/L (ref 133–144)
SOURCE: NORMAL
SP GR UR STRIP: 1.02 (ref 1–1.03)
TRIGL SERPL-MCNC: 123 MG/DL
TSH SERPL DL<=0.005 MIU/L-ACNC: 1.08 MU/L (ref 0.4–4)
UROBILINOGEN UR STRIP-ACNC: 0.2 EU/DL (ref 0.2–1)

## 2019-02-08 PROCEDURE — 83036 HEMOGLOBIN GLYCOSYLATED A1C: CPT | Performed by: NURSE PRACTITIONER

## 2019-02-08 PROCEDURE — 99214 OFFICE O/P EST MOD 30 MIN: CPT | Performed by: NURSE PRACTITIONER

## 2019-02-08 PROCEDURE — 84443 ASSAY THYROID STIM HORMONE: CPT | Performed by: NURSE PRACTITIONER

## 2019-02-08 PROCEDURE — 80053 COMPREHEN METABOLIC PANEL: CPT | Performed by: NURSE PRACTITIONER

## 2019-02-08 PROCEDURE — 81003 URINALYSIS AUTO W/O SCOPE: CPT | Performed by: NURSE PRACTITIONER

## 2019-02-08 PROCEDURE — 82043 UR ALBUMIN QUANTITATIVE: CPT | Performed by: NURSE PRACTITIONER

## 2019-02-08 PROCEDURE — 80061 LIPID PANEL: CPT | Performed by: NURSE PRACTITIONER

## 2019-02-08 PROCEDURE — 36415 COLL VENOUS BLD VENIPUNCTURE: CPT | Performed by: NURSE PRACTITIONER

## 2019-02-08 ASSESSMENT — PATIENT HEALTH QUESTIONNAIRE - PHQ9: SUM OF ALL RESPONSES TO PHQ QUESTIONS 1-9: 0

## 2019-02-08 ASSESSMENT — PAIN SCALES - GENERAL: PAINLEVEL: NO PAIN (0)

## 2019-02-08 NOTE — NURSING NOTE
"Chief Complaint   Patient presents with     Hypertension     Diabetes     Insomnia       Initial /80 (BP Location: Right arm, Patient Position: Sitting, Cuff Size: Adult Large)   Pulse 64   Resp 14   Wt (!) 160.1 kg (353 lb)   BMI 44.12 kg/m   Estimated body mass index is 44.12 kg/m  as calculated from the following:    Height as of 11/6/18: 1.905 m (6' 3\").    Weight as of this encounter: 160.1 kg (353 lb).  Medication Reconciliation: complete    Lexis Rico LPN    "

## 2019-02-08 NOTE — PATIENT INSTRUCTIONS
ASSESSMENT/PLAN:     1. Type 2 diabetes mellitus without complication, without long-term current use of insulin (H)  - UA reflex to Microscopic  - Albumin Random Urine Quantitative with Creat Ratio  - Hemoglobin A1c  - Lipid Profile  - Comprehensive metabolic panel (BMP + Alb, Alk Phos, ALT, AST, Total. Bili, TP)    2. Benign essential hypertension  - Lipid Profile  - TSH with free T4 reflex  - Comprehensive metabolic panel (BMP + Alb, Alk Phos, ALT, AST, Total. Bili, TP)    3. RACHELE (obstructive sleep apnea)  - order for DME; c-pap mask and supplies  DX: G47.33, sleep apnea  Dispense: 1 Device; Refill: 11        Samira Whitney NP  [facility]

## 2019-02-11 ENCOUNTER — TELEPHONE (OUTPATIENT)
Dept: FAMILY MEDICINE | Facility: OTHER | Age: 49
End: 2019-02-11

## 2019-02-11 RX ORDER — METFORMIN HCL 500 MG
1000 TABLET, EXTENDED RELEASE 24 HR ORAL 2 TIMES DAILY WITH MEALS
Qty: 120 TABLET | Refills: 1 | Status: SHIPPED | OUTPATIENT
Start: 2019-02-11 | End: 2019-05-10

## 2019-02-11 RX ORDER — SIMVASTATIN 10 MG
10 TABLET ORAL AT BEDTIME
Qty: 90 TABLET | Refills: 1 | COMMUNITY
Start: 2019-02-11 | End: 2019-09-16

## 2019-02-11 NOTE — RESULT ENCOUNTER NOTE
Metformin increased to 2 tabs po BID - sent to pharmacy  Lipids are high - see if he will consider Zocor 20 mg - if willing, add to med list and call in.  Recheck labs and visit 3 mos    Samira ALCALA  [phone]

## 2019-02-21 DIAGNOSIS — I10 BENIGN ESSENTIAL HYPERTENSION: ICD-10-CM

## 2019-02-21 RX ORDER — AMLODIPINE BESYLATE 10 MG/1
10 TABLET ORAL DAILY
Qty: 90 TABLET | Refills: 1 | COMMUNITY
Start: 2019-02-21 | End: 2019-09-16

## 2019-02-25 DIAGNOSIS — E11.9 TYPE 2 DIABETES MELLITUS WITHOUT COMPLICATION, WITHOUT LONG-TERM CURRENT USE OF INSULIN (H): ICD-10-CM

## 2019-02-25 NOTE — TELEPHONE ENCOUNTER
metFORMIN (GLUCOPHAGE-XR) 500 MG 24 hr tablet  Last Written Prescription Date:  2/11/19  Last Fill Quantity: 120,   # refills: 1  Last Office Visit: 2/8/19  Future Office visit:

## 2019-02-26 RX ORDER — METFORMIN HCL 500 MG
1000 TABLET, EXTENDED RELEASE 24 HR ORAL 2 TIMES DAILY WITH MEALS
Qty: 120 TABLET | Refills: 1 | OUTPATIENT
Start: 2019-02-26

## 2019-04-03 DIAGNOSIS — I10 BENIGN ESSENTIAL HYPERTENSION: ICD-10-CM

## 2019-04-03 RX ORDER — LISINOPRIL 10 MG/1
TABLET ORAL
Qty: 90 TABLET | Refills: 2 | Status: SHIPPED | OUTPATIENT
Start: 2019-04-03 | End: 2019-09-16

## 2019-05-10 DIAGNOSIS — E11.9 TYPE 2 DIABETES MELLITUS WITHOUT COMPLICATION, WITHOUT LONG-TERM CURRENT USE OF INSULIN (H): ICD-10-CM

## 2019-05-10 RX ORDER — METFORMIN HCL 500 MG
TABLET, EXTENDED RELEASE 24 HR ORAL
Qty: 120 TABLET | Refills: 1 | Status: SHIPPED | OUTPATIENT
Start: 2019-05-10 | End: 2019-09-16

## 2019-06-07 DIAGNOSIS — J30.2 SEASONAL ALLERGIC RHINITIS: ICD-10-CM

## 2019-06-07 RX ORDER — LORATADINE 10 MG/1
TABLET ORAL
Qty: 30 TABLET | Refills: 0 | OUTPATIENT
Start: 2019-06-07

## 2019-06-07 NOTE — TELEPHONE ENCOUNTER
loratadine (CLARITIN) 10 MG tablet  Last Written Prescription Date:  Not on med list   Last Fill Quantity: 0,   # refills: 0  Last Office Visit: 2/8/19  Future Office visit:

## 2019-06-24 ENCOUNTER — OFFICE VISIT (OUTPATIENT)
Dept: FAMILY MEDICINE | Facility: OTHER | Age: 49
End: 2019-06-24
Attending: NURSE PRACTITIONER
Payer: COMMERCIAL

## 2019-06-24 VITALS
DIASTOLIC BLOOD PRESSURE: 84 MMHG | HEIGHT: 72 IN | BODY MASS INDEX: 42.66 KG/M2 | WEIGHT: 315 LBS | OXYGEN SATURATION: 96 % | HEART RATE: 72 BPM | TEMPERATURE: 96.9 F | RESPIRATION RATE: 14 BRPM | SYSTOLIC BLOOD PRESSURE: 126 MMHG

## 2019-06-24 DIAGNOSIS — M10.071 ACUTE IDIOPATHIC GOUT INVOLVING TOE OF RIGHT FOOT: Primary | ICD-10-CM

## 2019-06-24 LAB — URATE SERPL-MCNC: 7.3 MG/DL (ref 3.5–7.2)

## 2019-06-24 PROCEDURE — 84550 ASSAY OF BLOOD/URIC ACID: CPT | Performed by: FAMILY MEDICINE

## 2019-06-24 PROCEDURE — 36415 COLL VENOUS BLD VENIPUNCTURE: CPT | Performed by: FAMILY MEDICINE

## 2019-06-24 PROCEDURE — 99213 OFFICE O/P EST LOW 20 MIN: CPT | Performed by: FAMILY MEDICINE

## 2019-06-24 RX ORDER — COLCHICINE 0.6 MG/1
TABLET ORAL
Qty: 3 TABLET | Refills: 2 | Status: SHIPPED | OUTPATIENT
Start: 2019-06-24 | End: 2019-09-16

## 2019-06-24 RX ORDER — INDOMETHACIN 25 MG/1
25 CAPSULE ORAL 3 TIMES DAILY PRN
Qty: 60 CAPSULE | Refills: 1 | Status: SHIPPED | OUTPATIENT
Start: 2019-06-24 | End: 2020-02-21

## 2019-06-24 ASSESSMENT — MIFFLIN-ST. JEOR: SCORE: 2505.13

## 2019-06-24 ASSESSMENT — PAIN SCALES - GENERAL: PAINLEVEL: SEVERE PAIN (7)

## 2019-06-24 NOTE — PROGRESS NOTES
SUBJECTIVE:   Jason Duncan is a 49 year old male who presents to clinic today for the following   health issues:        Possible Gout      Duration: Saturday    Description (location/character/radiation): Great Right Toe    Intensity:  7/10    Accompanying signs and symptoms: swollen    History (similar episodes/previous evaluation): 1 year ago    Precipitating or alleviating factors: None    Therapies tried and outcome: None         Additional history: as documented    Reviewed  and updated as needed this visit by clinical staff  Tobacco  Allergies  Meds         Reviewed and updated as needed this visit by Provider         Patient Active Problem List   Diagnosis     Benign essential hypertension     Mixed hearing loss, unilateral     ACP (advance care planning)     Morbid obesity due to excess calories (H)     RACHELE (obstructive sleep apnea)     Vitamin D deficiency     Type 2 diabetes mellitus without complication, without long-term current use of insulin (H)     Gastric band slippage     Status post bariatric surgery     Past Surgical History:   Procedure Laterality Date     GASTRIC RESTRICTIVE PROCEDURE, OPEN, REMOVE/REPLACE SUBCUTANEOUS PORT  2008     GI SURGERY  2015    removal of lap band      lap band  2008     nasal septoplasty       SEPTOPLASTY      Nasal       Social History     Tobacco Use     Smoking status: Never Smoker     Smokeless tobacco: Never Used   Substance Use Topics     Alcohol use: Yes     Comment: rarely     Family History   Problem Relation Age of Onset     Dementia Mother 72        Cause of death     Diabetes Father      Respiratory Father         COPD     Cardiovascular Father         CHF         Current Outpatient Medications   Medication Sig Dispense Refill     amLODIPine (NORVASC) 10 MG tablet Take 1 tablet (10 mg) by mouth daily 90 tablet 1     aspirin 81 MG tablet Take by mouth daily       blood glucose monitoring (NO BRAND SPECIFIED) meter device kit Use to test blood sugar 2  times daily or as directed.    Machine and all supplies needed    DX: DM II 1 kit 0     cetirizine (ZYRTEC) 10 MG tablet TAKE 1 TABLET BY MOUTH EVERY EVENING 90 tablet 3     colchicine (COLCYRS) 0.6 MG tablet Take 2 tablets (1.2 mg) now, then take 1 tablet (0.6 mg) one hour later 3 tablet 2     cyclobenzaprine (FLEXERIL) 10 MG tablet TAKE 1 TABLET BY MOUTH NIGHTLY AS NEEDED FOR MUSCLE SPASMS 90 tablet 3     indomethacin (INDOCIN) 25 MG capsule Take 1 capsule (25 mg) by mouth 3 times daily as needed for moderate pain 60 capsule 1     lisinopril (PRINIVIL/ZESTRIL) 10 MG tablet TAKE 1 TABLET BY MOUTH DAILY 90 tablet 2     metFORMIN (GLUCOPHAGE-XR) 500 MG 24 hr tablet TAKE 2 TABLETS BY MOUTH 2 TIMES DAILY WITH MEALS 120 tablet 1     montelukast (SINGULAIR) 10 MG tablet Take 1 tablet (10 mg) by mouth At Bedtime 90 tablet 3     multivitamin, therapeutic with minerals (MULTI-VITAMIN) TABS Take 1 tablet by mouth daily       order for DME c-pap mask and supplies    DX: G47.33, sleep apnea 1 Device 11     simvastatin (ZOCOR) 10 MG tablet Take 1 tablet (10 mg) by mouth At Bedtime 90 tablet 1     traZODone (DESYREL) 50 MG tablet 1-2 po hs PRN 60 tablet 0     VITAMIN D, CHOLECALCIFEROL, PO Take 5,000 Units by mouth daily        No Known Allergies    ROS:  Constitutional, HEENT, cardiovascular, pulmonary, gi and gu systems are negative, except as otherwise noted.    OBJECTIVE:                                                    /84 (BP Location: Right arm, Patient Position: Sitting, Cuff Size: Adult Large)   Pulse 72   Temp 96.9  F (36.1  C) (Tympanic)   Resp 14   Ht 1.829 m (6')   Wt (!) 159.2 kg (351 lb)   SpO2 96%   BMI 47.60 kg/m    Body mass index is 47.6 kg/m .  GENERAL APPEARANCE: healthy, alert and no distress  MS: mild erythema and swelling around 1st MTP joint of right foot  PSYCH: mentation appears normal and affect normal/bright         ASSESSMENT/PLAN:                                                    1.  Acute idiopathic gout involving toe of right foot  Colchicine prescribed for acute treatment, and indocin given for any lingering symptoms.  We will recheck uric acid level.  Follow-up as directed.  - colchicine (COLCYRS) 0.6 MG tablet; Take 2 tablets (1.2 mg) now, then take 1 tablet (0.6 mg) one hour later  Dispense: 3 tablet; Refill: 2  - indomethacin (INDOCIN) 25 MG capsule; Take 1 capsule (25 mg) by mouth 3 times daily as needed for moderate pain  Dispense: 60 capsule; Refill: 1  - Uric acid        Meenu Shay MD  [facility]

## 2019-07-06 DIAGNOSIS — J30.2 SEASONAL ALLERGIC RHINITIS, UNSPECIFIED TRIGGER: ICD-10-CM

## 2019-07-08 RX ORDER — LORATADINE 10 MG/1
TABLET ORAL
Qty: 30 TABLET | Refills: 0 | Status: SHIPPED | OUTPATIENT
Start: 2019-07-08 | End: 2019-10-09

## 2019-07-08 NOTE — TELEPHONE ENCOUNTER
Not o med list.    loratadine (CLARITIN) 10 MG tablet (Discontinued)      Last Written Prescription Date:  5/30/18-8/7/18  Last Fill Quantity: 30,   # refills: 11  Last Office Visit: 6/24/19  Future Office visit:       Routing refill request to provider for review/approval because:  Drug not active on patient's medication list

## 2019-09-16 ENCOUNTER — OFFICE VISIT (OUTPATIENT)
Dept: FAMILY MEDICINE | Facility: OTHER | Age: 49
End: 2019-09-16
Attending: NURSE PRACTITIONER
Payer: COMMERCIAL

## 2019-09-16 VITALS
OXYGEN SATURATION: 96 % | WEIGHT: 315 LBS | HEART RATE: 93 BPM | DIASTOLIC BLOOD PRESSURE: 82 MMHG | TEMPERATURE: 97.8 F | BODY MASS INDEX: 48.28 KG/M2 | SYSTOLIC BLOOD PRESSURE: 142 MMHG

## 2019-09-16 DIAGNOSIS — E78.5 HYPERLIPIDEMIA LDL GOAL <100: ICD-10-CM

## 2019-09-16 DIAGNOSIS — M25.511 ACUTE PAIN OF RIGHT SHOULDER: ICD-10-CM

## 2019-09-16 DIAGNOSIS — M1A.09X0 CHRONIC GOUT OF MULTIPLE SITES, UNSPECIFIED CAUSE: ICD-10-CM

## 2019-09-16 DIAGNOSIS — E11.9 TYPE 2 DIABETES MELLITUS WITHOUT COMPLICATION, WITHOUT LONG-TERM CURRENT USE OF INSULIN (H): Primary | ICD-10-CM

## 2019-09-16 DIAGNOSIS — F51.01 PRIMARY INSOMNIA: ICD-10-CM

## 2019-09-16 DIAGNOSIS — J30.2 CHRONIC SEASONAL ALLERGIC RHINITIS: ICD-10-CM

## 2019-09-16 DIAGNOSIS — I10 BENIGN ESSENTIAL HYPERTENSION: ICD-10-CM

## 2019-09-16 PROCEDURE — 99214 OFFICE O/P EST MOD 30 MIN: CPT | Performed by: NURSE PRACTITIONER

## 2019-09-16 RX ORDER — SIMVASTATIN 10 MG
10 TABLET ORAL AT BEDTIME
Qty: 90 TABLET | Refills: 1 | Status: SHIPPED | OUTPATIENT
Start: 2019-09-16 | End: 2020-04-09

## 2019-09-16 RX ORDER — CETIRIZINE HYDROCHLORIDE 10 MG/1
TABLET ORAL
Qty: 90 TABLET | Refills: 3 | Status: SHIPPED | OUTPATIENT
Start: 2019-09-16 | End: 2020-08-10

## 2019-09-16 RX ORDER — AMLODIPINE BESYLATE 10 MG/1
10 TABLET ORAL DAILY
Qty: 90 TABLET | Refills: 1 | Status: SHIPPED | OUTPATIENT
Start: 2019-09-16 | End: 2020-11-30

## 2019-09-16 RX ORDER — METFORMIN HCL 500 MG
TABLET, EXTENDED RELEASE 24 HR ORAL
Qty: 360 TABLET | Refills: 1 | Status: SHIPPED | OUTPATIENT
Start: 2019-09-16 | End: 2020-03-12

## 2019-09-16 RX ORDER — CYCLOBENZAPRINE HCL 10 MG
TABLET ORAL
Qty: 90 TABLET | Refills: 3 | Status: SHIPPED | OUTPATIENT
Start: 2019-09-16 | End: 2021-05-14

## 2019-09-16 RX ORDER — TRAZODONE HYDROCHLORIDE 50 MG/1
TABLET, FILM COATED ORAL
Qty: 60 TABLET | Refills: 5 | Status: SHIPPED | OUTPATIENT
Start: 2019-09-16 | End: 2020-02-21

## 2019-09-16 RX ORDER — MONTELUKAST SODIUM 10 MG/1
10 TABLET ORAL AT BEDTIME
Qty: 90 TABLET | Refills: 3 | Status: SHIPPED | OUTPATIENT
Start: 2019-09-16 | End: 2021-01-05

## 2019-09-16 RX ORDER — LISINOPRIL 10 MG/1
10 TABLET ORAL DAILY
Qty: 90 TABLET | Refills: 1 | Status: SHIPPED | OUTPATIENT
Start: 2019-09-16 | End: 2020-02-21

## 2019-09-16 RX ORDER — LORATADINE 10 MG/1
1 TABLET ORAL DAILY
Qty: 30 TABLET | Refills: 0 | Status: CANCELLED | OUTPATIENT
Start: 2019-09-16

## 2019-09-16 ASSESSMENT — ANXIETY QUESTIONNAIRES
6. BECOMING EASILY ANNOYED OR IRRITABLE: NOT AT ALL
5. BEING SO RESTLESS THAT IT IS HARD TO SIT STILL: NOT AT ALL
2. NOT BEING ABLE TO STOP OR CONTROL WORRYING: NOT AT ALL
1. FEELING NERVOUS, ANXIOUS, OR ON EDGE: NOT AT ALL
IF YOU CHECKED OFF ANY PROBLEMS ON THIS QUESTIONNAIRE, HOW DIFFICULT HAVE THESE PROBLEMS MADE IT FOR YOU TO DO YOUR WORK, TAKE CARE OF THINGS AT HOME, OR GET ALONG WITH OTHER PEOPLE: NOT DIFFICULT AT ALL
3. WORRYING TOO MUCH ABOUT DIFFERENT THINGS: NOT AT ALL
7. FEELING AFRAID AS IF SOMETHING AWFUL MIGHT HAPPEN: NOT AT ALL
GAD7 TOTAL SCORE: 0

## 2019-09-16 ASSESSMENT — PAIN SCALES - GENERAL: PAINLEVEL: NO PAIN (0)

## 2019-09-16 ASSESSMENT — PATIENT HEALTH QUESTIONNAIRE - PHQ9
5. POOR APPETITE OR OVEREATING: NOT AT ALL
SUM OF ALL RESPONSES TO PHQ QUESTIONS 1-9: 0

## 2019-09-16 NOTE — PROGRESS NOTES
Jason Duncan is a 49 year old male who presents to clinic today for the following health issues:        Diabetes Follow-up    How often are you checking your blood sugar? Not at all    What time of day are you checking your blood sugars (select all that apply)?  Before meals    Have you had any blood sugars above 200?  No, unknown    Have you had any blood sugars below 70?  No, unknown    What symptoms do you notice when your blood sugar is low?  None    What concerns do you have today about your diabetes? None     Do you have any of these symptoms? (Select all that apply)  No numbness or tingling in feet.  No redness, sores or blisters on feet.  No complaints of excessive thirst.  No reports of blurry vision.  No significant changes to weight.     Have you had a diabetic eye exam in the last 12 months? Yes- Date of last eye exam: about 6 months ago  Diabetes Management Resources      Hyperlipidemia Follow-Up    Are you having any of the following symptoms? (Select all that apply)  No complaints of shortness of breath, chest pain or pressure.  No increased sweating or nausea with activity.  No left-sided neck or arm pain.  No complaints of pain in calves when walking 1-2 blocks.    Are you regularly taking any medication or supplement to lower your cholesterol?   Yes- Simvastatin    Are you having muscle aches or other side effects that you think could be caused by your cholesterol lowering medication?  No      Hypertension Follow-up    Do you check your blood pressure regularly outside of the clinic? No     Are you following a low salt diet? Yes    Are your blood pressures ever more than 140 on the top number (systolic) OR more   than 90 on the bottom number (diastolic), for example 140/90? Yes       BP Readings from Last 2 Encounters:   09/16/19 (!) 142/82   06/24/19 126/84     Hemoglobin A1C (%)   Date Value   02/08/2019 6.8 (H)   08/07/2018 6.9 (H)     LDL Cholesterol Calculated (mg/dL)   Date Value    02/08/2019 145 (H)   08/07/2018 138 (H)         How many servings of fruits and vegetables do you eat daily?  2-3    On average, how many sweetened beverages do you drink each day (soda, juice, sweet tea, etc)?   2    How many days per week do you miss taking your medication? 0      Insomnia - Takes Trazodone PRN      Gout history - has taken Indocin and Colcyrs      Allergic Rhinitis - stable    PHQ-9 SCORE 11/6/2018 2/8/2019 9/16/2019   PHQ-9 Total Score 0 0 0     MARIO ALBERTO-7 SCORE 8/7/2018 11/6/2018 9/16/2019   Total Score 0 0 0           Patient Active Problem List   Diagnosis     Benign essential hypertension     Mixed hearing loss, unilateral     Encounter for monitoring statin therapy     Morbid obesity due to excess calories (H)     RACHELE (obstructive sleep apnea)     Vitamin D deficiency     Type 2 diabetes mellitus without complication, without long-term current use of insulin (H)     Gastric band slippage     Status post bariatric surgery     Hyperlipidemia LDL goal <100     Primary insomnia     Chronic gout of multiple sites     Past Surgical History:   Procedure Laterality Date     GASTRIC RESTRICTIVE PROCEDURE, OPEN, REMOVE/REPLACE SUBCUTANEOUS PORT  2008     GI SURGERY  2015    removal of lap band      lap band  2008     nasal septoplasty       SEPTOPLASTY      Nasal       Social History     Tobacco Use     Smoking status: Never Smoker     Smokeless tobacco: Never Used   Substance Use Topics     Alcohol use: Yes     Comment: rarely     Family History   Problem Relation Age of Onset     Dementia Mother 72        Cause of death     Diabetes Father      Respiratory Father         COPD     Cardiovascular Father         CHF         Current Outpatient Medications   Medication Sig Dispense Refill     amLODIPine (NORVASC) 10 MG tablet Take 1 tablet (10 mg) by mouth daily 90 tablet 1     cetirizine (ZYRTEC) 10 MG tablet TAKE 1 TABLET BY MOUTH EVERY EVENING 90 tablet 3     cyclobenzaprine (FLEXERIL) 10 MG tablet TAKE  1 TABLET BY MOUTH NIGHTLY AS NEEDED FOR MUSCLE SPASMS 90 tablet 3     lisinopril (PRINIVIL/ZESTRIL) 10 MG tablet Take 1 tablet (10 mg) by mouth daily 90 tablet 1     metFORMIN (GLUCOPHAGE-XR) 500 MG 24 hr tablet TAKE 2 TABLETS BY MOUTH 2 TIMES DAILY WITH MEALS 360 tablet 1     montelukast (SINGULAIR) 10 MG tablet Take 1 tablet (10 mg) by mouth At Bedtime 90 tablet 3     simvastatin (ZOCOR) 10 MG tablet Take 1 tablet (10 mg) by mouth At Bedtime 90 tablet 1     traZODone (DESYREL) 50 MG tablet 1-2 po hs PRN 60 tablet 5     aspirin 81 MG tablet Take by mouth daily       blood glucose monitoring (NO BRAND SPECIFIED) meter device kit Use to test blood sugar 2 times daily or as directed.    Machine and all supplies needed    DX: DM II 1 kit 0     indomethacin (INDOCIN) 25 MG capsule Take 1 capsule (25 mg) by mouth 3 times daily as needed for moderate pain 60 capsule 1     loratadine (CLARITIN) 10 MG tablet TAKE 1 TABLET BY MOUTH DAILY 30 tablet 0     multivitamin, therapeutic with minerals (MULTI-VITAMIN) TABS Take 1 tablet by mouth daily       order for DME c-pap mask and supplies    DX: G47.33, sleep apnea 1 Device 11     VITAMIN D, CHOLECALCIFEROL, PO Take 5,000 Units by mouth daily          No Known Allergies       Recent Labs   Lab Test 02/08/19  1026 08/07/18  0940 04/27/18  1026  01/10/17  1043 10/21/15  0925   A1C 6.8* 6.9* 7.0*  --   --   --    * 138*  --   --   --  105   HDL 42 41  --   --   --  51   TRIG 123 131  --   --   --  147   ALT 97* 102*  --   --  83* 54   CR 1.08 1.06  --    < > 1.05 0.96   GFRESTIMATED 81 75  --    < > 76 85   GFRESTBLACK >90 >90  --    < > >90   GFR Calc   >90   GFR Calc     POTASSIUM 4.4 3.8  --    < > 3.6 4.3   TSH 1.08 1.42  --    < >  --   --     < > = values in this interval not displayed.        BP Readings from Last 3 Encounters:   09/16/19 (!) 142/82   06/24/19 126/84   02/08/19 120/76    Wt Readings from Last 3 Encounters:   09/16/19  (!) 161.5 kg (356 lb)   06/24/19 (!) 159.2 kg (351 lb)   02/08/19 (!) 160.1 kg (353 lb)               Reviewed and updated as needed this visit by Provider         Review of Systems   ROS COMP: Constitutional, HEENT, cardiovascular, pulmonary, GI, , musculoskeletal, neuro, skin, endocrine and psych systems are negative, except as otherwise noted.        Objective    BP (!) 142/82 (BP Location: Right arm, Patient Position: Sitting, Cuff Size: Adult Regular)   Pulse 93   Temp 97.8  F (36.6  C) (Tympanic)   Wt (!) 161.5 kg (356 lb)   SpO2 96%   BMI 48.28 kg/m    Body mass index is 48.28 kg/m .       Physical Exam   GENERAL: healthy, alert and no distress  EYES: Eyes grossly normal to inspection, PERRL and conjunctivae and sclerae normal  HENT: ear canals and TM's normal, nose and mouth without ulcers or lesions  NECK: no adenopathy, no asymmetry, masses, or scars and thyroid normal to palpation  RESP: lungs clear to auscultation - no rales, rhonchi or wheezes  CV: regular rate and rhythm, normal S1 S2, no S3 or S4, no murmur, click or rub, no peripheral edema and peripheral pulses strong  MS: no gross musculoskeletal defects noted, no edema  SKIN: no suspicious lesions or rashes  Diabetic foot exam: normal DP and PT pulses, no trophic changes or ulcerative lesions and normal sensory exam    Non-fasting, will return for lab        Assessment & Plan     1. Type 2 diabetes mellitus without complication, without long-term current use of insulin (H)  - metFORMIN (GLUCOPHAGE-XR) 500 MG 24 hr tablet; TAKE 2 TABLETS BY MOUTH 2 TIMES DAILY WITH MEALS  Dispense: 360 tablet; Refill: 1  - UA reflex to Microscopic; Future  - Albumin Random Urine Quantitative with Creat Ratio; Future  - Hemoglobin A1c; Future  - TSH with free T4 reflex; Future    2. Benign essential hypertension  - lisinopril (PRINIVIL/ZESTRIL) 10 MG tablet; Take 1 tablet (10 mg) by mouth daily  Dispense: 90 tablet; Refill: 1  - amLODIPine (NORVASC) 10 MG  tablet; Take 1 tablet (10 mg) by mouth daily  Dispense: 90 tablet; Refill: 1  - TSH with free T4 reflex; Future    3. Hyperlipidemia LDL goal <100  - simvastatin (ZOCOR) 10 MG tablet; Take 1 tablet (10 mg) by mouth At Bedtime  Dispense: 90 tablet; Refill: 1  - Lipid Profile; Future    4. Primary insomnia  - traZODone (DESYREL) 50 MG tablet; 1-2 po hs PRN  Dispense: 60 tablet; Refill: 5    5. Chronic gout of multiple sites, unspecified cause  - Follow-up as needed    6 .Pain of right shoulder  - cyclobenzaprine (FLEXERIL) 10 MG tablet; TAKE 1 TABLET BY MOUTH NIGHTLY AS NEEDED FOR MUSCLE SPASMS  Dispense: 90 tablet; Refill: 3    7. Chronic seasonal allergic rhinitis  - montelukast (SINGULAIR) 10 MG tablet; Take 1 tablet (10 mg) by mouth At Bedtime  Dispense: 90 tablet; Refill: 3  - cetirizine (ZYRTEC) 10 MG tablet; TAKE 1 TABLET BY MOUTH EVERY EVENING  Dispense: 90 tablet; Refill: 3           BMI:   Estimated body mass index is 48.28 kg/m  as calculated from the following:    Height as of 6/24/19: 1.829 m (6').    Weight as of this encounter: 161.5 kg (356 lb).           Return in 6 months (on 3/16/2020).       Samira Whitney CNP  [facility]

## 2019-09-16 NOTE — PATIENT INSTRUCTIONS
Assessment & Plan     1. Type 2 diabetes mellitus without complication, without long-term current use of insulin (H)  - metFORMIN (GLUCOPHAGE-XR) 500 MG 24 hr tablet; TAKE 2 TABLETS BY MOUTH 2 TIMES DAILY WITH MEALS  Dispense: 360 tablet; Refill: 1  - UA reflex to Microscopic; Future  - Albumin Random Urine Quantitative with Creat Ratio; Future  - Hemoglobin A1c; Future  - TSH with free T4 reflex; Future    2. Benign essential hypertension  - lisinopril (PRINIVIL/ZESTRIL) 10 MG tablet; Take 1 tablet (10 mg) by mouth daily  Dispense: 90 tablet; Refill: 1  - amLODIPine (NORVASC) 10 MG tablet; Take 1 tablet (10 mg) by mouth daily  Dispense: 90 tablet; Refill: 1  - TSH with free T4 reflex; Future    3. Hyperlipidemia LDL goal <100  - simvastatin (ZOCOR) 10 MG tablet; Take 1 tablet (10 mg) by mouth At Bedtime  Dispense: 90 tablet; Refill: 1  - Lipid Profile; Future    4. Primary insomnia  - traZODone (DESYREL) 50 MG tablet; 1-2 po hs PRN  Dispense: 60 tablet; Refill: 5    5. Chronic gout of multiple sites, unspecified cause  - Follow-up as needed    6 .Pain of right shoulder  - cyclobenzaprine (FLEXERIL) 10 MG tablet; TAKE 1 TABLET BY MOUTH NIGHTLY AS NEEDED FOR MUSCLE SPASMS  Dispense: 90 tablet; Refill: 3    7. Chronic seasonal allergic rhinitis  - montelukast (SINGULAIR) 10 MG tablet; Take 1 tablet (10 mg) by mouth At Bedtime  Dispense: 90 tablet; Refill: 3  - cetirizine (ZYRTEC) 10 MG tablet; TAKE 1 TABLET BY MOUTH EVERY EVENING  Dispense: 90 tablet; Refill: 3           BMI:   Estimated body mass index is 48.28 kg/m  as calculated from the following:    Height as of 6/24/19: 1.829 m (6').    Weight as of this encounter: 161.5 kg (356 lb).           Return in 6 months (on 3/16/2020).       Samira Whitney CNP  [facility]

## 2019-09-16 NOTE — NURSING NOTE
Chief Complaint   Patient presents with     Hypertension     Diabetes     Lipids       Initial BP (!) 142/82 (BP Location: Right arm, Patient Position: Sitting, Cuff Size: Adult Regular)   Pulse 93   Temp 97.8  F (36.6  C) (Tympanic)   Wt (!) 161.5 kg (356 lb)   SpO2 96%   BMI 48.28 kg/m   Estimated body mass index is 48.28 kg/m  as calculated from the following:    Height as of 6/24/19: 1.829 m (6').    Weight as of this encounter: 161.5 kg (356 lb).  Medication Reconciliation: complete  Ashley A. Lechevalier, LPN

## 2019-09-17 ASSESSMENT — ANXIETY QUESTIONNAIRES: GAD7 TOTAL SCORE: 0

## 2019-10-09 DIAGNOSIS — J30.2 SEASONAL ALLERGIC RHINITIS, UNSPECIFIED TRIGGER: ICD-10-CM

## 2019-10-09 RX ORDER — LORATADINE 10 MG/1
TABLET ORAL
Qty: 30 TABLET | Refills: 5 | Status: SHIPPED | OUTPATIENT
Start: 2019-10-09 | End: 2020-07-17

## 2019-10-16 ENCOUNTER — HOSPITAL ENCOUNTER (EMERGENCY)
Facility: HOSPITAL | Age: 49
Discharge: HOME | End: 2019-10-16
Attending: NURSE PRACTITIONER | Admitting: NURSE PRACTITIONER
Payer: COMMERCIAL

## 2019-10-16 VITALS
OXYGEN SATURATION: 98 % | HEIGHT: 74 IN | WEIGHT: 315 LBS | SYSTOLIC BLOOD PRESSURE: 148 MMHG | DIASTOLIC BLOOD PRESSURE: 82 MMHG | RESPIRATION RATE: 16 BRPM | TEMPERATURE: 96.2 F | BODY MASS INDEX: 40.43 KG/M2

## 2019-10-16 DIAGNOSIS — H10.32 ACUTE CONJUNCTIVITIS OF LEFT EYE: Primary | ICD-10-CM

## 2019-10-16 PROCEDURE — 99213 OFFICE O/P EST LOW 20 MIN: CPT | Mod: Z6 | Performed by: NURSE PRACTITIONER

## 2019-10-16 PROCEDURE — G0463 HOSPITAL OUTPT CLINIC VISIT: HCPCS

## 2019-10-16 RX ORDER — POLYMYXIN B SULFATE AND TRIMETHOPRIM 1; 10000 MG/ML; [USP'U]/ML
1-2 SOLUTION OPHTHALMIC EVERY 4 HOURS
Qty: 1 BOTTLE | Refills: 0 | Status: SHIPPED | OUTPATIENT
Start: 2019-10-16 | End: 2020-02-21

## 2019-10-16 RX ORDER — ERYTHROMYCIN 5 MG/G
0.5 OINTMENT OPHTHALMIC 2 TIMES DAILY
Qty: 3.5 G | Refills: 0 | Status: SHIPPED | OUTPATIENT
Start: 2019-10-16 | End: 2019-10-16 | Stop reason: ALTCHOICE

## 2019-10-16 ASSESSMENT — MIFFLIN-ST. JEOR: SCORE: 2532.34

## 2019-10-16 NOTE — ED AVS SNAPSHOT
[facility]  [address] [place] [id number]  Phone:  [phone]                                    Jason Duncan   MRN: [id number]    Department:  [facility]   Date of Visit:  10/16/2019           After Visit Summary Signature Page    I have received my discharge instructions, and my questions have been answered. I have discussed any challenges I see with this plan with the nurse or doctor.    ..........................................................................................................................................  Patient/Patient Representative Signature      ..........................................................................................................................................  Patient Representative Print Name and Relationship to Patient    ..................................................               ................................................  Date                                   Time    ..........................................................................................................................................  Reviewed by Signature/Title    ...................................................              ..............................................  Date                                               Time          22EPIC Rev 08/18

## 2019-10-16 NOTE — ED PROVIDER NOTES
History     Chief Complaint   Patient presents with     Eye Drainage     left eye with incresaed drainage the past 3 mornings     HPI  Jason Duncan is a 49 year old male who presents to  for left eye drainage. Onset 3 days ago. He reports crusty drainage closing eye shut in the mornings. No changes to his vsion. He is supposed to wear eyeglasses but states he currently does not.     Allergies:  No Known Allergies    Problem List:    Patient Active Problem List    Diagnosis Date Noted     Hyperlipidemia LDL goal <100 09/16/2019     Priority: Medium     Primary insomnia 09/16/2019     Priority: Medium     Chronic gout of multiple sites 09/16/2019     Priority: Medium     Type 2 diabetes mellitus without complication, without long-term current use of insulin (H) 07/10/2018     Priority: Medium     Vitamin D deficiency 04/17/2018     Priority: Medium     RACHELE (obstructive sleep apnea) 05/30/2017     Priority: Medium     Morbid obesity due to excess calories (H) 09/07/2016     Priority: Medium     Encounter for monitoring statin therapy 06/28/2016     Priority: Medium     Advance Care Planning 6/28/2016: ACP Review of Chart / Resources Provided:  Reviewed chart for advance care plan.  Jason Duncan has no plan or code status on file. Discussed available resources and provided with information. Confirmed code status reflects current choices pending further ACP discussions.  Confirmed/documented legally designated decision makers.  Added by Phyllis Olvera             Gastric band slippage 09/01/2015     Priority: Medium     Mixed hearing loss, unilateral 11/20/2013     Priority: Medium     Status post bariatric surgery 09/25/2008     Priority: Medium     Benign essential hypertension 06/09/2008     Priority: Medium        Past Medical History:    Past Medical History:   Diagnosis Date     Allergic rhinitis      Benign essential hypertension 6/9/2008     Chronic gout of multiple sites 9/16/2019     Cramp of limb  "4/17/2018     Hyperlipidemia LDL goal <100 9/16/2019     RACHELE (obstructive sleep apnea)      Primary insomnia 9/16/2019     Type 2 diabetes mellitus without complication, without long-term current use of insulin (H) 7/10/2018     Vitamin D deficiency 4/17/2018       Past Surgical History:    Past Surgical History:   Procedure Laterality Date     GASTRIC RESTRICTIVE PROCEDURE, OPEN, REMOVE/REPLACE SUBCUTANEOUS PORT  2008     GI SURGERY  2015    removal of lap band      lap band  2008     nasal septoplasty       SEPTOPLASTY      Nasal       Family History:    Family History   Problem Relation Age of Onset     Dementia Mother 72        Cause of death     Diabetes Father      Respiratory Father         COPD     Cardiovascular Father         CHF       Social History:  Marital Status:   [2]  Social History     Tobacco Use     Smoking status: Never Smoker     Smokeless tobacco: Never Used   Substance Use Topics     Alcohol use: Yes     Comment: rarely     Drug use: No        Medications:    amLODIPine (NORVASC) 10 MG tablet  aspirin 81 MG tablet  blood glucose monitoring (NO BRAND SPECIFIED) meter device kit  cetirizine (ZYRTEC) 10 MG tablet  cyclobenzaprine (FLEXERIL) 10 MG tablet  lisinopril (PRINIVIL/ZESTRIL) 10 MG tablet  metFORMIN (GLUCOPHAGE-XR) 500 MG 24 hr tablet  order for DME  simvastatin (ZOCOR) 10 MG tablet  traZODone (DESYREL) 50 MG tablet  trimethoprim-polymyxin b (POLYTRIM) 42616-9.1 UNIT/ML-% ophthalmic solution  VITAMIN D, CHOLECALCIFEROL, PO  indomethacin (INDOCIN) 25 MG capsule  loratadine (CLARITIN) 10 MG tablet  montelukast (SINGULAIR) 10 MG tablet  multivitamin, therapeutic with minerals (MULTI-VITAMIN) TABS          Review of Systems   Eyes: Positive for discharge. Negative for pain and visual disturbance.   All other systems reviewed and are negative.      Physical Exam   BP: 148/82  Heart Rate: 74  Temp: 96.2  F (35.7  C)  Resp: 16  Height: 188 cm (6' 2\")  Weight: (!) 158.8 kg (350 lb)  SpO2: " 98 %      Physical Exam  Vitals signs and nursing note reviewed.   Constitutional:       General: He is not in acute distress.     Appearance: He is not ill-appearing.   Eyes:      General:         Right eye: No discharge or hordeolum.         Left eye: No discharge or hordeolum.      Extraocular Movements: Extraocular movements intact.      Conjunctiva/sclera: Conjunctivae normal.      Pupils: Pupils are equal, round, and reactive to light.   Neck:      Musculoskeletal: Normal range of motion.   Cardiovascular:      Rate and Rhythm: Normal rate.   Pulmonary:      Effort: Pulmonary effort is normal.   Neurological:      Mental Status: He is alert.         ED Course        Procedures         No results found for this or any previous visit (from the past 24 hour(s)).    Medications - No data to display    Assessments & Plan (with Medical Decision Making)     I have reviewed the nursing notes.    I have reviewed the findings, diagnosis, plan and need for follow up with the patient.  Acute conjunctivitis of left eye:  Patient reports crusty drainage to left eye in the mornings.  PERRLA.  EOMs intact.  No visible discharge, erythema or hordeolum to the left eye.  Visual acuity 20/50 (right); 20/40 (left) without eyeglasses.  Polytrim eyedrops prescribed.  Encourage patient to practice good hand hygiene.  Discussed with patient to follow-up with eye doctor to get new eyeglasses.  Return to emergency department for worsening or concerning symptoms.    Discharge Medication List as of 10/16/2019 11:40 AM      START taking these medications    Details   trimethoprim-polymyxin b (POLYTRIM) 62593-1.1 UNIT/ML-% ophthalmic solution Place 1-2 drops Into the left eye every 4 hours, Disp-1 Bottle, R-0, E-Prescribe             Final diagnoses:   Acute conjunctivitis of left eye       10/16/2019   [facility]     Mpofu, Joycelynnce, CNP  10/18/19 0011

## 2019-10-18 ASSESSMENT — ENCOUNTER SYMPTOMS
EYE DISCHARGE: 1
EYE PAIN: 0

## 2019-10-29 NOTE — PROGRESS NOTES
Subjective     Jason Duncan is a 49 year old male who presents to clinic today for the following health issues:    HPI   Concern - ear problem   Onset: ongoing for a month     Description:   Fullness, feeling of fluid in them, pain in the left ear    Intensity: moderate    Progression of Symptoms:  worsening    Accompanying Signs & Symptoms:  Cold symptoms, cough, congestion    Precipitating factors:   Worsened by: cold weather    Alleviating factors:  Improved by: dayquil/niquil      Patient Active Problem List   Diagnosis     Benign essential hypertension     Mixed hearing loss, unilateral     Encounter for monitoring statin therapy     Morbid obesity due to excess calories (H)     RACHELE (obstructive sleep apnea)     Vitamin D deficiency     Type 2 diabetes mellitus without complication, without long-term current use of insulin (H)     Gastric band slippage     Status post bariatric surgery     Hyperlipidemia LDL goal <100     Primary insomnia     Chronic gout of multiple sites     Past Surgical History:   Procedure Laterality Date     GASTRIC RESTRICTIVE PROCEDURE, OPEN, REMOVE/REPLACE SUBCUTANEOUS PORT  2008     GI SURGERY  2015    removal of lap band      lap band  2008     nasal septoplasty       SEPTOPLASTY      Nasal       Social History     Tobacco Use     Smoking status: Never Smoker     Smokeless tobacco: Never Used   Substance Use Topics     Alcohol use: Yes     Comment: rarely     Family History   Problem Relation Age of Onset     Dementia Mother 72        Cause of death     Diabetes Father      Respiratory Father         COPD     Cardiovascular Father         CHF         Current Outpatient Medications   Medication Sig Dispense Refill     amLODIPine (NORVASC) 10 MG tablet Take 1 tablet (10 mg) by mouth daily 90 tablet 1     amoxicillin (AMOXIL) 875 MG tablet Take 1 tablet (875 mg) by mouth 2 times daily for 10 days 20 tablet 0     aspirin 81 MG tablet Take by mouth daily       blood glucose monitoring  (NO BRAND SPECIFIED) meter device kit Use to test blood sugar 2 times daily or as directed.    Machine and all supplies needed    DX: DM II 1 kit 0     cetirizine (ZYRTEC) 10 MG tablet TAKE 1 TABLET BY MOUTH EVERY EVENING 90 tablet 3     cyclobenzaprine (FLEXERIL) 10 MG tablet TAKE 1 TABLET BY MOUTH NIGHTLY AS NEEDED FOR MUSCLE SPASMS 90 tablet 3     lisinopril (PRINIVIL/ZESTRIL) 10 MG tablet Take 1 tablet (10 mg) by mouth daily 90 tablet 1     loratadine (CLARITIN) 10 MG tablet TAKE 1 TABLET BY MOUTH DAILY 30 tablet 5     metFORMIN (GLUCOPHAGE-XR) 500 MG 24 hr tablet TAKE 2 TABLETS BY MOUTH 2 TIMES DAILY WITH MEALS 360 tablet 1     montelukast (SINGULAIR) 10 MG tablet Take 1 tablet (10 mg) by mouth At Bedtime 90 tablet 3     multivitamin, therapeutic with minerals (MULTI-VITAMIN) TABS Take 1 tablet by mouth daily       order for DME c-pap mask and supplies    DX: G47.33, sleep apnea 1 Device 11     simvastatin (ZOCOR) 10 MG tablet Take 1 tablet (10 mg) by mouth At Bedtime 90 tablet 1     traZODone (DESYREL) 50 MG tablet 1-2 po hs PRN 60 tablet 5     trimethoprim-polymyxin b (POLYTRIM) 92655-8.1 UNIT/ML-% ophthalmic solution Place 1-2 drops Into the left eye every 4 hours 1 Bottle 0     VITAMIN D, CHOLECALCIFEROL, PO Take 5,000 Units by mouth daily        indomethacin (INDOCIN) 25 MG capsule Take 1 capsule (25 mg) by mouth 3 times daily as needed for moderate pain (Patient not taking: Reported on 10/31/2019) 60 capsule 1     No Known Allergies  Recent Labs   Lab Test 02/08/19  1026 08/07/18  0940 04/27/18  1026  01/10/17  1043 10/21/15  0925   A1C 6.8* 6.9* 7.0*  --   --   --    * 138*  --   --   --  105   HDL 42 41  --   --   --  51   TRIG 123 131  --   --   --  147   ALT 97* 102*  --   --  83* 54   CR 1.08 1.06  --    < > 1.05 0.96   GFRESTIMATED 81 75  --    < > 76 85   GFRESTBLACK >90 >90  --    < > >90   GFR Calc   >90   GFR Calc     POTASSIUM 4.4 3.8  --    < > 3.6 4.3  "  TSH 1.08 1.42  --    < >  --   --     < > = values in this interval not displayed.      BP Readings from Last 3 Encounters:   10/31/19 134/82   10/16/19 148/82   09/16/19 (!) 142/82    Wt Readings from Last 3 Encounters:   10/31/19 (!) 155.1 kg (342 lb)   10/16/19 (!) 158.8 kg (350 lb)   09/16/19 (!) 161.5 kg (356 lb)               Reviewed and updated as needed this visit by Provider  Allergies         Review of Systems   ROS COMP: Constitutional, HEENT, cardiovascular, pulmonary, gi and gu systems are negative, except as otherwise noted.      Objective    /82   Pulse 79   Temp 97.2  F (36.2  C)   Wt (!) 155.1 kg (342 lb)   SpO2 98%   BMI 43.91 kg/m    Body mass index is 43.91 kg/m .  Physical Exam   GENERAL: healthy, alert and no distress  HENT: normal cephalic/atraumatic, right ear: normal: no effusions, no erythema, normal landmarks, left ear: erythematous and bulging membrane, nose and mouth without ulcers or lesions, oropharynx clear and oral mucous membranes moist, throat mildly erythematous without exudate  NECK: no adenopathy, no asymmetry, masses, or scars and thyroid normal to palpation  RESP: lungs clear to auscultation - no rales, rhonchi or wheezes  CV: regular rate and rhythm, normal S1 S2, no S3 or S4, no murmur, click or rub, no peripheral edema and peripheral pulses strong  MS: no gross musculoskeletal defects noted, no edema  PSYCH: mentation appears normal, affect normal/bright            Assessment & Plan     1. Other acute nonsuppurative otitis media of left ear, recurrence not specified  - amoxicillin (AMOXIL) 875 MG tablet; Take 1 tablet (875 mg) by mouth 2 times daily for 10 days  Dispense: 20 tablet; Refill: 0     BMI:   Estimated body mass index is 43.91 kg/m  as calculated from the following:    Height as of 10/16/19: 1.88 m (6' 2\").    Weight as of this encounter: 155.1 kg (342 lb).   Weight management plan: Discussed healthy diet and exercise guidelines        Return in " about 6 months (around 4/30/2020) for diabetes, lipids, HTN.    Nikki Matthew NP  [facility]

## 2019-10-31 ENCOUNTER — TRANSFERRED RECORDS (OUTPATIENT)
Dept: HEALTH INFORMATION MANAGEMENT | Facility: CLINIC | Age: 49
End: 2019-10-31

## 2019-10-31 ENCOUNTER — OFFICE VISIT (OUTPATIENT)
Dept: FAMILY MEDICINE | Facility: OTHER | Age: 49
End: 2019-10-31
Attending: NURSE PRACTITIONER
Payer: COMMERCIAL

## 2019-10-31 VITALS
DIASTOLIC BLOOD PRESSURE: 82 MMHG | HEART RATE: 79 BPM | TEMPERATURE: 97.2 F | SYSTOLIC BLOOD PRESSURE: 134 MMHG | OXYGEN SATURATION: 98 % | WEIGHT: 315 LBS | BODY MASS INDEX: 43.91 KG/M2

## 2019-10-31 DIAGNOSIS — E11.9 TYPE 2 DIABETES MELLITUS WITHOUT COMPLICATION, WITHOUT LONG-TERM CURRENT USE OF INSULIN (H): ICD-10-CM

## 2019-10-31 DIAGNOSIS — E78.5 HYPERLIPIDEMIA LDL GOAL <100: ICD-10-CM

## 2019-10-31 DIAGNOSIS — I10 BENIGN ESSENTIAL HYPERTENSION: ICD-10-CM

## 2019-10-31 DIAGNOSIS — H65.192 OTHER ACUTE NONSUPPURATIVE OTITIS MEDIA OF LEFT EAR, RECURRENCE NOT SPECIFIED: Primary | ICD-10-CM

## 2019-10-31 LAB
ALBUMIN UR-MCNC: NEGATIVE MG/DL
APPEARANCE UR: CLEAR
BILIRUB UR QL STRIP: NEGATIVE
CHOLEST SERPL-MCNC: 167 MG/DL
COLOR UR AUTO: YELLOW
CREAT UR-MCNC: 105 MG/DL
EST. AVERAGE GLUCOSE BLD GHB EST-MCNC: 140 MG/DL
GLUCOSE UR STRIP-MCNC: NEGATIVE MG/DL
HBA1C MFR BLD: 6.5 % (ref 0–5.6)
HDLC SERPL-MCNC: 39 MG/DL
HGB UR QL STRIP: NEGATIVE
KETONES UR STRIP-MCNC: NEGATIVE MG/DL
LDLC SERPL CALC-MCNC: 92 MG/DL
LEUKOCYTE ESTERASE UR QL STRIP: NEGATIVE
MICROALBUMIN UR-MCNC: 13 MG/L
MICROALBUMIN/CREAT UR: 12.38 MG/G CR (ref 0–17)
NITRATE UR QL: NEGATIVE
NONHDLC SERPL-MCNC: 128 MG/DL
PH UR STRIP: 7 PH (ref 5–7)
SOURCE: NORMAL
SP GR UR STRIP: 1.01 (ref 1–1.03)
TRIGL SERPL-MCNC: 182 MG/DL
TSH SERPL DL<=0.005 MIU/L-ACNC: 1.1 MU/L (ref 0.4–4)
UROBILINOGEN UR STRIP-ACNC: 0.2 EU/DL (ref 0.2–1)

## 2019-10-31 PROCEDURE — 81003 URINALYSIS AUTO W/O SCOPE: CPT | Performed by: NURSE PRACTITIONER

## 2019-10-31 PROCEDURE — 40000788 ZZHCL STATISTIC ESTIMATED AVERAGE GLUCOSE: Performed by: NURSE PRACTITIONER

## 2019-10-31 PROCEDURE — 99213 OFFICE O/P EST LOW 20 MIN: CPT | Performed by: NURSE PRACTITIONER

## 2019-10-31 PROCEDURE — 84443 ASSAY THYROID STIM HORMONE: CPT | Performed by: NURSE PRACTITIONER

## 2019-10-31 PROCEDURE — 80061 LIPID PANEL: CPT | Performed by: NURSE PRACTITIONER

## 2019-10-31 PROCEDURE — 82043 UR ALBUMIN QUANTITATIVE: CPT | Performed by: NURSE PRACTITIONER

## 2019-10-31 PROCEDURE — 36415 COLL VENOUS BLD VENIPUNCTURE: CPT | Performed by: NURSE PRACTITIONER

## 2019-10-31 PROCEDURE — 83036 HEMOGLOBIN GLYCOSYLATED A1C: CPT | Performed by: NURSE PRACTITIONER

## 2019-10-31 RX ORDER — AMOXICILLIN 875 MG
875 TABLET ORAL 2 TIMES DAILY
Qty: 20 TABLET | Refills: 0 | Status: SHIPPED | OUTPATIENT
Start: 2019-10-31 | End: 2020-02-21

## 2019-10-31 ASSESSMENT — PAIN SCALES - GENERAL: PAINLEVEL: MILD PAIN (2)

## 2019-10-31 NOTE — PATIENT INSTRUCTIONS
"    Assessment & Plan     1. Other acute nonsuppurative otitis media of left ear, recurrence not specified  - amoxicillin (AMOXIL) 875 MG tablet; Take 1 tablet (875 mg) by mouth 2 times daily for 10 days  Dispense: 20 tablet; Refill: 0     BMI:   Estimated body mass index is 43.91 kg/m  as calculated from the following:    Height as of 10/16/19: 1.88 m (6' 2\").    Weight as of this encounter: 155.1 kg (342 lb).   Weight management plan: Discussed healthy diet and exercise guidelines        Return in about 6 months (around 4/30/2020) for diabetes, lipids, HTN.    Nikki Matthew NP  [facility]      "

## 2019-10-31 NOTE — NURSING NOTE
"Chief Complaint   Patient presents with     Otalgia       Initial /82   Pulse 79   Temp 97.2  F (36.2  C)   Wt (!) 155.1 kg (342 lb)   SpO2 98%   BMI 43.91 kg/m   Estimated body mass index is 43.91 kg/m  as calculated from the following:    Height as of 10/16/19: 1.88 m (6' 2\").    Weight as of this encounter: 155.1 kg (342 lb).  Medication Reconciliation: complete  Jessa Behrman, LPN    "

## 2019-11-08 ENCOUNTER — HEALTH MAINTENANCE LETTER (OUTPATIENT)
Age: 49
End: 2019-11-08

## 2020-02-07 ENCOUNTER — APPOINTMENT (OUTPATIENT)
Dept: GENERAL RADIOLOGY | Facility: HOSPITAL | Age: 50
End: 2020-02-07
Attending: NURSE PRACTITIONER
Payer: COMMERCIAL

## 2020-02-07 ENCOUNTER — APPOINTMENT (OUTPATIENT)
Dept: ULTRASOUND IMAGING | Facility: HOSPITAL | Age: 50
End: 2020-02-07
Attending: NURSE PRACTITIONER
Payer: COMMERCIAL

## 2020-02-07 ENCOUNTER — HOSPITAL ENCOUNTER (EMERGENCY)
Facility: HOSPITAL | Age: 50
Discharge: HOME | End: 2020-02-07
Attending: NURSE PRACTITIONER | Admitting: NURSE PRACTITIONER
Payer: COMMERCIAL

## 2020-02-07 VITALS
OXYGEN SATURATION: 98 % | WEIGHT: 315 LBS | SYSTOLIC BLOOD PRESSURE: 162 MMHG | RESPIRATION RATE: 16 BRPM | BODY MASS INDEX: 45.01 KG/M2 | DIASTOLIC BLOOD PRESSURE: 103 MMHG | TEMPERATURE: 98 F

## 2020-02-07 DIAGNOSIS — M25.562 ACUTE PAIN OF LEFT KNEE: ICD-10-CM

## 2020-02-07 PROCEDURE — 73562 X-RAY EXAM OF KNEE 3: CPT | Mod: TC,LT

## 2020-02-07 PROCEDURE — G0463 HOSPITAL OUTPT CLINIC VISIT: HCPCS

## 2020-02-07 PROCEDURE — 99213 OFFICE O/P EST LOW 20 MIN: CPT | Mod: Z6 | Performed by: NURSE PRACTITIONER

## 2020-02-07 PROCEDURE — 73502 X-RAY EXAM HIP UNI 2-3 VIEWS: CPT | Mod: TC

## 2020-02-07 PROCEDURE — G0463 HOSPITAL OUTPT CLINIC VISIT: HCPCS | Mod: 25

## 2020-02-07 PROCEDURE — 93971 EXTREMITY STUDY: CPT | Mod: TC,LT

## 2020-02-07 RX ORDER — HYDROCODONE BITARTRATE AND ACETAMINOPHEN 7.5; 325 MG/1; MG/1
1 TABLET ORAL EVERY 6 HOURS PRN
Qty: 15 TABLET | Refills: 0 | Status: SHIPPED | OUTPATIENT
Start: 2020-02-07 | End: 2020-02-21

## 2020-02-07 NOTE — ED AVS SNAPSHOT
[facility]  [address] [address]  Phone:  [phone]                                    Jason Duncan   MRN: [id number]    Department:  [facility]   Date of Visit:  2/7/2020           After Visit Summary Signature Page    I have received my discharge instructions, and my questions have been answered. I have discussed any challenges I see with this plan with the nurse or doctor.    ..........................................................................................................................................  Patient/Patient Representative Signature      ..........................................................................................................................................  Patient Representative Print Name and Relationship to Patient    ..................................................               ................................................  Date                                   Time    ..........................................................................................................................................  Reviewed by Signature/Title    ...................................................              ..............................................  Date                                               Time          22EPIC Rev 08/18

## 2020-02-07 NOTE — ED TRIAGE NOTES
"Pt here with c/o left leg pain  'hip, knee, and shin\" . Pt denies any injury. \"shin is numb\" onset last Sunday. Pt report ibu tylenol with no relief. Pt reports the only relief was was he used aleve 4 tab total. Pt denies any bruising or swelling.  "

## 2020-02-08 NOTE — ED PROVIDER NOTES
"  History     Chief Complaint   Patient presents with     Leg Pain     HPI  Jason Duncan is a 49 year old male who presents ambulatory with concerns of left leg pain. Started last Sunday. Denies back pain. Shin is numb. Denies injury, trauma, wounds.   Left hip- sharp, intermittent, left knee- constant sometimes aches and sometimes \"really hurts\" - worse in the left knee. Currently at rest 6/10. Pain increases with staying in one position for too long, walking, standing. He has tried ibuprofen, Tylenol, Aleve - no improvement (aleve worked for the first day but not since).      DM- compliant with medications, last A1c was 6.5%    Allergies:  No Known Allergies    Problem List:    Patient Active Problem List    Diagnosis Date Noted     Hyperlipidemia LDL goal <100 09/16/2019     Priority: Medium     Primary insomnia 09/16/2019     Priority: Medium     Chronic gout of multiple sites 09/16/2019     Priority: Medium     Type 2 diabetes mellitus without complication, without long-term current use of insulin (H) 07/10/2018     Priority: Medium     Vitamin D deficiency 04/17/2018     Priority: Medium     RACHELE (obstructive sleep apnea) 05/30/2017     Priority: Medium     Morbid obesity due to excess calories (H) 09/07/2016     Priority: Medium     Encounter for monitoring statin therapy 06/28/2016     Priority: Medium     Advance Care Planning 6/28/2016: ACP Review of Chart / Resources Provided:  Reviewed chart for advance care plan.  Jason Duncan has no plan or code status on file. Discussed available resources and provided with information. Confirmed code status reflects current choices pending further ACP discussions.  Confirmed/documented legally designated decision makers.  Added by Phyllis Olvera             Gastric band slippage 09/01/2015     Priority: Medium     Mixed hearing loss, unilateral 11/20/2013     Priority: Medium     Status post bariatric surgery 09/25/2008     Priority: Medium     Benign essential " hypertension 06/09/2008     Priority: Medium        Past Medical History:    Past Medical History:   Diagnosis Date     Allergic rhinitis      Benign essential hypertension 6/9/2008     Chronic gout of multiple sites 9/16/2019     Cramp of limb 4/17/2018     Hyperlipidemia LDL goal <100 9/16/2019     RACHELE (obstructive sleep apnea)      Primary insomnia 9/16/2019     Type 2 diabetes mellitus without complication, without long-term current use of insulin (H) 7/10/2018     Vitamin D deficiency 4/17/2018       Past Surgical History:    Past Surgical History:   Procedure Laterality Date     GASTRIC RESTRICTIVE PROCEDURE, OPEN, REMOVE/REPLACE SUBCUTANEOUS PORT  2008     GI SURGERY  2015    removal of lap band      lap band  2008     nasal septoplasty       SEPTOPLASTY      Nasal       Family History:    Family History   Problem Relation Age of Onset     Dementia Mother 72        Cause of death     Diabetes Father      Respiratory Father         COPD     Cardiovascular Father         CHF       Social History:  Marital Status:   [2]  Social History     Tobacco Use     Smoking status: Never Smoker     Smokeless tobacco: Never Used   Substance Use Topics     Alcohol use: Yes     Comment: rarely     Drug use: No        Medications:    amLODIPine (NORVASC) 10 MG tablet  aspirin 81 MG tablet  cetirizine (ZYRTEC) 10 MG tablet  cyclobenzaprine (FLEXERIL) 10 MG tablet  HYDROcodone-acetaminophen (NORCO) 7.5-325 MG per tablet  indomethacin (INDOCIN) 25 MG capsule  lisinopril (PRINIVIL/ZESTRIL) 10 MG tablet  loratadine (CLARITIN) 10 MG tablet  metFORMIN (GLUCOPHAGE-XR) 500 MG 24 hr tablet  montelukast (SINGULAIR) 10 MG tablet  multivitamin, therapeutic with minerals (MULTI-VITAMIN) TABS  order for DME  simvastatin (ZOCOR) 10 MG tablet  traZODone (DESYREL) 50 MG tablet  trimethoprim-polymyxin b (POLYTRIM) 15911-6.1 UNIT/ML-% ophthalmic solution  VITAMIN D, CHOLECALCIFEROL, PO          Review of Systems   Musculoskeletal: Positive  for arthralgias. Negative for back pain and joint swelling.   Skin: Negative for color change, rash and wound.   Neurological: Positive for numbness (shin).       Physical Exam   BP: (!) 162/103  Heart Rate: 76  Temp: 98  F (36.7  C)  Resp: 16  Weight: (!) 159 kg (350 lb 8.5 oz)  SpO2: 98 %      Physical Exam  Constitutional:       Appearance: Normal appearance.   Cardiovascular:      Rate and Rhythm: Normal rate and regular rhythm.      Pulses:           Dorsalis pedis pulses are 2+ on the left side.        Posterior tibial pulses are 2+ on the left side.      Heart sounds: S1 normal and S2 normal. No murmur. No friction rub. No gallop.    Pulmonary:      Effort: Pulmonary effort is normal.      Breath sounds: Normal breath sounds.   Musculoskeletal:      Left knee: He exhibits normal range of motion, no swelling, no effusion, no ecchymosis and no deformity.      Right lower leg: No edema.      Left lower leg: No edema.        Legs:    Skin:     General: Skin is warm and dry.      Capillary Refill: Capillary refill takes less than 2 seconds.      Coloration: Skin is not pale.   Neurological:      Mental Status: He is alert and oriented to person, place, and time.   Psychiatric:         Speech: Speech normal.         Behavior: Behavior normal. Behavior is cooperative.         ED Course        Procedures    Results for orders placed or performed during the hospital encounter of 02/07/20   XR Knee Left 3 Views     Status: None    Narrative    PROCEDURE:  XR KNEE LT 3 VW    HISTORY: pain    COMPARISON:  None.    TECHNIQUE:  3 views of the left knee were obtained.    FINDINGS:  Degenerative osteophytes are seen at the medial femoral  tibial and patellofemoral articulation. The lateral femoral tibial  articulation appears normal. The distal femur and the proximal tibia  and fibula are intact.       Impression    IMPRESSION: Osteoarthritic changes at the patellofemoral and medial  femoral tibial articulations      KINGA  MD CARLOS   XR Pelvis including Hip Left 2-3 Views     Status: None    Narrative    PROCEDURE: XR PELVIS AND HIP LEFT 2 VIEWS 2/7/2020 7:21 PM    HISTORY: pain, weakness left leg    COMPARISONS: None.    TECHNIQUE: Pelvis one view, left hip 2 views    FINDINGS: Pelvis: The pelvis is intact. The sacrum and sacroiliac  joints appear normal. Both proximal femurs appear intact.    Left hip 2 views: The acetabulum and left proximal femur appears  normal. The articular spaces normal height.         Impression    IMPRESSION: Normal pelvis and left hip    KINGA GONZALEZ MD   US Lower Extremity Venous Duplex Left     Status: None    Narrative    Exam:US LOWER EXTREMITY VENOUS DUPLEX LEFT    History: Left leg pain and swelling    Comparisons: None    Technique: Venous duplex ultrasonography of the left lower extremity  was performed.     Findings: The common femoral vein, superficial femoral vein and  popliteal vein are fully compressible with spontaneous and augmentable  venous flow.           Impression    Impression: No evidence of deep venous thrombosis within the left  lower extremity.    KINGA GONZALEZ MD            No results found for this or any previous visit (from the past 24 hour(s)).    Medications - No data to display    Assessments & Plan (with Medical Decision Making)     I have reviewed the nursing notes.    I have reviewed the findings, diagnosis, plan and need for follow up with the patient.  (M25.562) Acute pain of left knee  Comment: acute, symptomatic  Plan: US negative for DVT. XR hip and pelvis is normal. XR of left knee shows degenerative changes - may be contributing, differentials include patellar tendonitis, meniscus, others.     Symptomatic treatments recommended:  - Continue with conservative measures including Rest, Ice, Compression, Elevation, Tylenol and/or Ibuprofen per package instructions for discomfort  (You can alternate Tylenol (acetaminophen) with Advil (ibuprofen).  Example:  Ibuprofen 8 AM, Tylenol 12 PM, ibuprofen 4 PM, Tylenol 8 PM, ibuprofen 10 PM, etc.).  Take ibuprofen with food.  - After initial injury you can try heat for comfort if this feels better than ice  - Topical anesthetic such as Biofreeze, Bengay, Icy Hot, Lidocaine, others.   - Avoid overusing or exerting which could delay healing and cause further injury  - If no improvement or worsening symptoms return for further evaluation- may need further imaging, consider orthopedic referral.      If no improvement with the above you can try the hydrocodone as directed.     Narcotics are intended for short term use of acute pain.  They are not typically indicated for long term use or treatment of chronic pain due to risks and side effects.  Ensure you are taking your narcotic prescription as prescribed and using sparingly after tryin.) non-pharmacologic (Rest, Ice and/or Heat, Compression, Elevation, Massage, Topical anesthetic such as Bengay, Biofreeze, lidocaine, etc, chiropractor, support braces, others) and   2.) non-narcotic analgesics such as Tylenol 500-650 mg four times daily and/or ibuprofen 800 mg every 8 hours with food to avoid stomach upset. If you have ever been told to avoid acetaminophen (Tylenol) due to liver disease or nonsteroidal antiinflammatories (NASIDs) due to kidney disease, gastritis, stomach ulcers, etc. Avoid use.  Avoid operating vehicle/heavy equipment while taking narcotics. Also avoid drinking alcohol and using elicit substances while taking narcotics as doing so may increase your risk of side effects including respiratory depression which can lead to death.  Common side effects of narcotics include but are not limited to: constipation, nausea, vomiting, dizziness, sedation.     PROCEDURE:  XR KNEE LT 3 VW     HISTORY: pain     COMPARISON:  None.     TECHNIQUE:  3 views of the left knee were obtained.     FINDINGS:  Degenerative osteophytes are seen at the medial femoral  tibial and  patellofemoral articulation. The lateral femoral tibial  articulation appears normal. The distal femur and the proximal tibia  and fibula are intact.                                                                       IMPRESSION: Osteoarthritic changes at the patellofemoral and medial  femoral tibial articulations            RETURN TO THE ED WITH NEW OR WORSENING SYMPTOMS.    FOLLOW-UP WITH YOUR PRIMARY CARE PROVIDER IN 7-10 DAYS.      Discharge Medication List as of 2/7/2020  7:40 PM      START taking these medications    Details   HYDROcodone-acetaminophen (NORCO) 7.5-325 MG per tablet Take 1 tablet by mouth every 6 hours as needed for severe pain, Disp-15 tablet, R-0, E-Prescribe             Final diagnoses:   Acute pain of left knee       2/7/2020   [facility]     Leslie Cortes, CNP  02/12/20 0969

## 2020-02-08 NOTE — DISCHARGE INSTRUCTIONS
(M25.562) Acute pain of left knee  Comment: acute, symptomatic  Plan: US negative for DVT. XR hip and pelvis is normal. XR of left knee shows degenerative changes - may be contributing, differentials include patellar tendonitis, meniscus, others.     Symptomatic treatments recommended:  - Continue with conservative measures including Rest, Ice, Compression, Elevation, Tylenol and/or Ibuprofen per package instructions for discomfort  (You can alternate Tylenol (acetaminophen) with Advil (ibuprofen).  Example: Ibuprofen 8 AM, Tylenol 12 PM, ibuprofen 4 PM, Tylenol 8 PM, ibuprofen 10 PM, etc.).  Take ibuprofen with food.  - After initial injury you can try heat for comfort if this feels better than ice  - Topical anesthetic such as Biofreeze, Bengay, Icy Hot, Lidocaine, others.   - Avoid overusing or exerting which could delay healing and cause further injury  - If no improvement or worsening symptoms return for further evaluation- may need further imaging, consider orthopedic referral.      If no improvement with the above you can try the hydrocodone as directed.     Narcotics are intended for short term use of acute pain.  They are not typically indicated for long term use or treatment of chronic pain due to risks and side effects.  Ensure you are taking your narcotic prescription as prescribed and using sparingly after tryin.) non-pharmacologic (Rest, Ice and/or Heat, Compression, Elevation, Massage, Topical anesthetic such as Bengay, Biofreeze, lidocaine, etc, chiropractor, support braces, others) and   2.) non-narcotic analgesics such as Tylenol 500-650 mg four times daily and/or ibuprofen 800 mg every 8 hours with food to avoid stomach upset. If you have ever been told to avoid acetaminophen (Tylenol) due to liver disease or nonsteroidal antiinflammatories (NASIDs) due to kidney disease, gastritis, stomach ulcers, etc. Avoid use.  Avoid operating vehicle/heavy equipment while taking narcotics. Also avoid  drinking alcohol and using elicit substances while taking narcotics as doing so may increase your risk of side effects including respiratory depression which can lead to death.  Common side effects of narcotics include but are not limited to: constipation, nausea, vomiting, dizziness, sedation.     PROCEDURE:  XR KNEE LT 3 VW     HISTORY: pain     COMPARISON:  None.     TECHNIQUE:  3 views of the left knee were obtained.     FINDINGS:  Degenerative osteophytes are seen at the medial femoral  tibial and patellofemoral articulation. The lateral femoral tibial  articulation appears normal. The distal femur and the proximal tibia  and fibula are intact.                                                                       IMPRESSION: Osteoarthritic changes at the patellofemoral and medial  femoral tibial articulations            RETURN TO THE ED WITH NEW OR WORSENING SYMPTOMS.    FOLLOW-UP WITH YOUR PRIMARY CARE PROVIDER IN 7-10 DAYS.      Leslie Cortes, CNP

## 2020-02-12 ASSESSMENT — ENCOUNTER SYMPTOMS
JOINT SWELLING: 0
NUMBNESS: 1
BACK PAIN: 0
WOUND: 0
COLOR CHANGE: 0
ARTHRALGIAS: 1

## 2020-02-21 ENCOUNTER — OFFICE VISIT (OUTPATIENT)
Dept: FAMILY MEDICINE | Facility: OTHER | Age: 50
End: 2020-02-21
Attending: NURSE PRACTITIONER
Payer: COMMERCIAL

## 2020-02-21 VITALS
SYSTOLIC BLOOD PRESSURE: 134 MMHG | BODY MASS INDEX: 44.17 KG/M2 | DIASTOLIC BLOOD PRESSURE: 92 MMHG | HEART RATE: 81 BPM | WEIGHT: 315 LBS | OXYGEN SATURATION: 97 % | TEMPERATURE: 97.3 F

## 2020-02-21 DIAGNOSIS — M17.12 PRIMARY OSTEOARTHRITIS OF LEFT KNEE: ICD-10-CM

## 2020-02-21 DIAGNOSIS — M25.562 LEFT KNEE PAIN, UNSPECIFIED CHRONICITY: Primary | ICD-10-CM

## 2020-02-21 DIAGNOSIS — M25.362 KNEE INSTABILITY, LEFT: ICD-10-CM

## 2020-02-21 DIAGNOSIS — G47.33 OSA (OBSTRUCTIVE SLEEP APNEA): ICD-10-CM

## 2020-02-21 DIAGNOSIS — I10 BENIGN ESSENTIAL HYPERTENSION: ICD-10-CM

## 2020-02-21 PROCEDURE — 99214 OFFICE O/P EST MOD 30 MIN: CPT | Performed by: NURSE PRACTITIONER

## 2020-02-21 RX ORDER — IBUPROFEN 800 MG/1
800 TABLET, FILM COATED ORAL EVERY 8 HOURS PRN
Qty: 90 TABLET | Refills: 1 | Status: SHIPPED | OUTPATIENT
Start: 2020-02-21 | End: 2021-01-08

## 2020-02-21 RX ORDER — LISINOPRIL 10 MG/1
TABLET ORAL
Qty: 90 TABLET | Refills: 1 | Status: SHIPPED | OUTPATIENT
Start: 2020-02-21 | End: 2020-09-18

## 2020-02-21 ASSESSMENT — PAIN SCALES - GENERAL: PAINLEVEL: MODERATE PAIN (4)

## 2020-02-21 NOTE — PROGRESS NOTES
Jason Duncan is a 50 year old male who presents to clinic today for the following health issues:      RACHELE - uses CPAP  Stable, does well with CPAP  Is due for annual supply renewal  Length of need:  Lifetime  Face to face visit - 2/21/2020        Left knee pain  Knee gave out a few days ago, causing him to fall down his stairs  Recurrent instability    Onset: 2 weeks    Description:   Location: left knee  Character: Sharp and Dull ache    Intensity: moderate    Progression of Symptoms: intermittent    Accompanying Signs & Symptoms:  Other symptoms: radiation of pain to shin, numbness and swelling    History:   Previous similar pain: no       Precipitating factors:   Trauma or overuse: YES- fell in parking lot about 2 weeks ago and then again on the stairs 3-4 days ago due to knee giving out.    Alleviating factors:  Improved by: rest/inactivity, ice, acetaminophen and Ibuprofen  Therapies Tried and outcome: see above- helps some        HTN  BP is elevated again today  No chest pain, SOB, edema or other concerning symptoms  Medication adjustment - increase lisinopril  Chronic disease management visit is due in April        Patient Active Problem List   Diagnosis     Benign essential hypertension     Mixed hearing loss, unilateral     Encounter for monitoring statin therapy     Morbid obesity due to excess calories (H)     RACHELE (obstructive sleep apnea)     Vitamin D deficiency     Type 2 diabetes mellitus without complication, without long-term current use of insulin (H)     Gastric band slippage     Status post bariatric surgery     Hyperlipidemia LDL goal <100     Primary insomnia     Chronic gout of multiple sites     Primary osteoarthritis of left knee     Past Surgical History:   Procedure Laterality Date     GASTRIC RESTRICTIVE PROCEDURE, OPEN, REMOVE/REPLACE SUBCUTANEOUS PORT  2008     GI SURGERY  2015    removal of lap band      lap band  2008     nasal septoplasty       SEPTOPLASTY      Nasal       Social  History     Tobacco Use     Smoking status: Never Smoker     Smokeless tobacco: Never Used   Substance Use Topics     Alcohol use: Yes     Comment: rarely     Family History   Problem Relation Age of Onset     Dementia Mother 72        Cause of death     Diabetes Father      Respiratory Father         COPD     Cardiovascular Father         CHF             Current Outpatient Medications   Medication Sig Dispense Refill     amLODIPine (NORVASC) 10 MG tablet Take 1 tablet (10 mg) by mouth daily 90 tablet 1     aspirin 81 MG tablet Take by mouth daily       cetirizine (ZYRTEC) 10 MG tablet TAKE 1 TABLET BY MOUTH EVERY EVENING 90 tablet 3     cyclobenzaprine (FLEXERIL) 10 MG tablet TAKE 1 TABLET BY MOUTH NIGHTLY AS NEEDED FOR MUSCLE SPASMS 90 tablet 3     lisinopril (PRINIVIL/ZESTRIL) 10 MG tablet Take 1 tablet (10 mg) by mouth daily 90 tablet 1     loratadine (CLARITIN) 10 MG tablet TAKE 1 TABLET BY MOUTH DAILY 30 tablet 5     metFORMIN (GLUCOPHAGE-XR) 500 MG 24 hr tablet TAKE 2 TABLETS BY MOUTH 2 TIMES DAILY WITH MEALS 360 tablet 1     montelukast (SINGULAIR) 10 MG tablet Take 1 tablet (10 mg) by mouth At Bedtime 90 tablet 3     multivitamin, therapeutic with minerals (MULTI-VITAMIN) TABS Take 1 tablet by mouth daily       order for DME c-pap mask and supplies    DX: G47.33, sleep apnea 1 Device 11     simvastatin (ZOCOR) 10 MG tablet Take 1 tablet (10 mg) by mouth At Bedtime 90 tablet 1     VITAMIN D, CHOLECALCIFEROL, PO Take 5,000 Units by mouth daily          No Known Allergies         Recent Labs   Lab Test 10/31/19  0931 02/08/19  1026 08/07/18  0940  01/10/17  1043   A1C 6.5* 6.8* 6.9*   < >  --    LDL 92 145* 138*  --   --    HDL 39* 42 41  --   --    TRIG 182* 123 131  --   --    ALT  --  97* 102*  --  83*   CR  --  1.08 1.06   < > 1.05   GFRESTIMATED  --  81 75   < > 76   GFRESTBLACK  --  >90 >90   < > >90  African American GFR Calc     POTASSIUM  --  4.4 3.8   < > 3.6   TSH 1.10 1.08 1.42   < >  --     < > =  values in this interval not displayed.         BP Readings from Last 3 Encounters:   02/21/20 (!) 134/92   02/07/20 (!) 162/103   10/31/19 134/82    Wt Readings from Last 3 Encounters:   02/21/20 (!) 156 kg (344 lb)   02/07/20 (!) 159 kg (350 lb 8.5 oz)   10/31/19 (!) 155.1 kg (342 lb)               Reviewed and updated as needed this visit by Provider         Review of Systems   ROS COMP: Constitutional, HEENT, cardiovascular, pulmonary, gi and gu systems are negative, except as otherwise noted.        Objective    BP (!) 134/92 (BP Location: Left arm, Patient Position: Sitting, Cuff Size: Adult Large)   Pulse 81   Temp 97.3  F (36.3  C) (Tympanic)   Wt (!) 156 kg (344 lb)   SpO2 97%   BMI 44.17 kg/m    Body mass index is 44.17 kg/m .       Physical Exam   GENERAL: healthy, alert and no distress  EYES: Eyes grossly normal to inspection, PERRL and conjunctivae and sclerae normal  HENT: ear canals and TM's normal, nose and mouth without ulcers or lesions  NECK: no adenopathy, no asymmetry, masses, or scars and thyroid normal to palpation  RESP: lungs clear to auscultation - no rales, rhonchi or wheezes  CV: regular rate and rhythm, normal S1 S2, no S3 or S4, no murmur, click or rub, no peripheral edema and peripheral pulses strong  MS: Medial and lateral left knee pain, instability, moderate swelling, medial and lateral  SKIN: no suspicious lesions or rashes        PROCEDURE:  XR KNEE LT 3 VW  HISTORY: pain     COMPARISON:  None.     TECHNIQUE:  3 views of the left knee were obtained.     FINDINGS:  Degenerative osteophytes are seen at the medial femoral  tibial and patellofemoral articulation. The lateral femoral tibial  articulation appears normal. The distal femur and the proximal tibia  and fibula are intact.                                                                  IMPRESSION: Osteoarthritic changes at the patellofemoral and medial  femoral tibial articulations       KINGA GONZALEZ MD             Assessment & Plan     1. Left knee pain, unspecified chronicity  - ORTHOPEDICS ADULT REFERRAL  - MR Knee Left w/o Contrast; Future  - ibuprofen 800 MG PO tablet; Take 1 tablet (800 mg) by mouth every 8 hours as needed for moderate pain  Dispense: 90 tablet; Refill: 1    2. Primary osteoarthritis of left knee  - ORTHOPEDICS ADULT REFERRAL  - MR Knee Left w/o Contrast; Future  - ibuprofen 800 MG PO tablet; Take 1 tablet (800 mg) by mouth every 8 hours as needed for moderate pain  Dispense: 90 tablet; Refill: 1    3. Knee instability, left  - ORTHOPEDICS ADULT REFERRAL  - MR Knee Left w/o Contrast; Future  - ibuprofen 800 MG PO tablet; Take 1 tablet (800 mg) by mouth every 8 hours as needed for moderate pain  Dispense: 90 tablet; Refill: 1    4. Benign essential hypertension  - lisinopril 10 MG PO tablet; 1.5 tabs daily for 15 mg  Dispense: 90 tablet; Refill: 1    5. RACHELE (obstructive sleep apnea)  - order for DME; c-pap mask and supplies  DX: G47.33, sleep apnea  RACHELE - uses CPAP  Stable, does well with CPAP  Is due for annual supply renewal  Length of need:  Lifetime  Face to face visit - 2/21/2020  Dispense: 1 Device; Refill: 11         Ice your knee  Elevate for comfort  Use your knee sleeve      Please schedule a morning fasting visit in April      Samira Whitney CNP  [facility]

## 2020-02-21 NOTE — PATIENT INSTRUCTIONS
Assessment & Plan     1. Left knee pain, unspecified chronicity  - ORTHOPEDICS ADULT REFERRAL  - MR Knee Left w/o Contrast; Future  - ibuprofen 800 MG PO tablet; Take 1 tablet (800 mg) by mouth every 8 hours as needed for moderate pain  Dispense: 90 tablet; Refill: 1    2. Primary osteoarthritis of left knee  - ORTHOPEDICS ADULT REFERRAL  - MR Knee Left w/o Contrast; Future  - ibuprofen 800 MG PO tablet; Take 1 tablet (800 mg) by mouth every 8 hours as needed for moderate pain  Dispense: 90 tablet; Refill: 1    3. Knee instability, left  - ORTHOPEDICS ADULT REFERRAL  - MR Knee Left w/o Contrast; Future  - ibuprofen 800 MG PO tablet; Take 1 tablet (800 mg) by mouth every 8 hours as needed for moderate pain  Dispense: 90 tablet; Refill: 1    4. Benign essential hypertension  - lisinopril 10 MG PO tablet; 1.5 tabs daily for 15 mg  Dispense: 90 tablet; Refill: 1    5. RACHELE (obstructive sleep apnea)  - order for DME; c-pap mask and supplies  DX: G47.33, sleep apnea  RACHELE - uses CPAP  Stable, does well with CPAP  Is due for annual supply renewal  Length of need:  Lifetime  Face to face visit - 2/21/2020  Dispense: 1 Device; Refill: 11         Ice your knee  Elevate for comfort  Use your knee sleeve      Please schedule a morning fasting visit in April      Samira Whitney CNP  [facility]

## 2020-02-21 NOTE — NURSING NOTE
"Chief Complaint   Patient presents with     Musculoskeletal Problem       Initial BP (!) 134/92 (BP Location: Left arm, Patient Position: Sitting, Cuff Size: Adult Large)   Pulse 81   Temp 97.3  F (36.3  C) (Tympanic)   Wt (!) 156 kg (344 lb)   SpO2 97%   BMI 44.17 kg/m   Estimated body mass index is 44.17 kg/m  as calculated from the following:    Height as of 10/16/19: 1.88 m (6' 2\").    Weight as of this encounter: 156 kg (344 lb).  Medication Reconciliation: complete  Ashley A. Lechevalier, LPN  "

## 2020-02-25 ENCOUNTER — MEDICAL CORRESPONDENCE (OUTPATIENT)
Dept: HEALTH INFORMATION MANAGEMENT | Facility: CLINIC | Age: 50
End: 2020-02-25

## 2020-02-28 ENCOUNTER — HOSPITAL ENCOUNTER (OUTPATIENT)
Dept: MRI IMAGING | Facility: HOSPITAL | Age: 50
Discharge: HOME | End: 2020-02-28
Attending: NURSE PRACTITIONER | Admitting: NURSE PRACTITIONER
Payer: COMMERCIAL

## 2020-02-28 DIAGNOSIS — M25.362 KNEE INSTABILITY, LEFT: ICD-10-CM

## 2020-02-28 DIAGNOSIS — M17.12 PRIMARY OSTEOARTHRITIS OF LEFT KNEE: ICD-10-CM

## 2020-02-28 DIAGNOSIS — M25.562 LEFT KNEE PAIN, UNSPECIFIED CHRONICITY: ICD-10-CM

## 2020-02-28 PROCEDURE — 73721 MRI JNT OF LWR EXTRE W/O DYE: CPT | Mod: TC,LT

## 2020-02-28 NOTE — RESULT ENCOUNTER NOTE
Degenerative medial meniscal tear  degenerative joint changes    I placed an ortho referral at the time of his appt.    Samira Whitney [facility]  [phone]

## 2020-03-06 ENCOUNTER — TRANSFERRED RECORDS (OUTPATIENT)
Dept: HEALTH INFORMATION MANAGEMENT | Facility: CLINIC | Age: 50
End: 2020-03-06

## 2020-04-09 DIAGNOSIS — E78.5 HYPERLIPIDEMIA LDL GOAL <100: ICD-10-CM

## 2020-04-09 RX ORDER — SIMVASTATIN 10 MG
TABLET ORAL
Qty: 90 TABLET | Refills: 0 | OUTPATIENT
Start: 2020-04-09

## 2020-06-08 ENCOUNTER — TRANSFERRED RECORDS (OUTPATIENT)
Dept: HEALTH INFORMATION MANAGEMENT | Facility: CLINIC | Age: 50
End: 2020-06-08

## 2020-06-08 LAB — RETINOPATHY: NEGATIVE

## 2020-06-30 ENCOUNTER — HOSPITAL ENCOUNTER (EMERGENCY)
Facility: HOSPITAL | Age: 50
Discharge: HOME | End: 2020-06-30
Attending: NURSE PRACTITIONER | Admitting: NURSE PRACTITIONER
Payer: COMMERCIAL

## 2020-06-30 ENCOUNTER — APPOINTMENT (OUTPATIENT)
Dept: GENERAL RADIOLOGY | Facility: HOSPITAL | Age: 50
End: 2020-06-30
Attending: NURSE PRACTITIONER
Payer: COMMERCIAL

## 2020-06-30 VITALS
DIASTOLIC BLOOD PRESSURE: 78 MMHG | SYSTOLIC BLOOD PRESSURE: 132 MMHG | RESPIRATION RATE: 18 BRPM | OXYGEN SATURATION: 97 % | TEMPERATURE: 97 F | HEART RATE: 69 BPM

## 2020-06-30 DIAGNOSIS — E11.9 TYPE 2 DIABETES MELLITUS WITHOUT COMPLICATION, WITHOUT LONG-TERM CURRENT USE OF INSULIN (H): Primary | ICD-10-CM

## 2020-06-30 DIAGNOSIS — M54.42 BILATERAL LOW BACK PAIN WITH LEFT-SIDED SCIATICA: Primary | ICD-10-CM

## 2020-06-30 PROCEDURE — 72100 X-RAY EXAM L-S SPINE 2/3 VWS: CPT | Mod: TC

## 2020-06-30 PROCEDURE — 99213 OFFICE O/P EST LOW 20 MIN: CPT | Mod: Z6 | Performed by: NURSE PRACTITIONER

## 2020-06-30 PROCEDURE — G0463 HOSPITAL OUTPT CLINIC VISIT: HCPCS | Mod: 25

## 2020-06-30 PROCEDURE — 96372 THER/PROPH/DIAG INJ SC/IM: CPT

## 2020-06-30 PROCEDURE — 25000128 H RX IP 250 OP 636: Performed by: NURSE PRACTITIONER

## 2020-06-30 RX ORDER — METHOCARBAMOL 750 MG/1
750 TABLET, FILM COATED ORAL 3 TIMES DAILY PRN
Qty: 21 TABLET | Refills: 0 | Status: SHIPPED | OUTPATIENT
Start: 2020-06-30 | End: 2020-07-27

## 2020-06-30 RX ORDER — KETOROLAC TROMETHAMINE 30 MG/ML
60 INJECTION, SOLUTION INTRAMUSCULAR; INTRAVENOUS ONCE
Status: COMPLETED | OUTPATIENT
Start: 2020-06-30 | End: 2020-06-30

## 2020-06-30 RX ADMIN — KETOROLAC TROMETHAMINE 60 MG: 30 INJECTION, SOLUTION INTRAMUSCULAR at 11:56

## 2020-06-30 ASSESSMENT — ENCOUNTER SYMPTOMS: BACK PAIN: 1

## 2020-06-30 NOTE — ED TRIAGE NOTES
Pt presents today with c/o lower back pain and left leg pain. Started 1 month ago for back, recently the leg started to hurt. No Known injury. No history of back pain. Constant aches. Pt has been taking 800 mg of ibuprofen. Pt does has a PCP, pt doesn't want to do the E-visit.

## 2020-06-30 NOTE — ED AVS SNAPSHOT
[facility]  [address] [address]  Phone:  [phone]                                    Jason Duncan   MRN: [id number]    Department:  [facility]   Date of Visit:  6/30/2020           After Visit Summary Signature Page    I have received my discharge instructions, and my questions have been answered. I have discussed any challenges I see with this plan with the nurse or doctor.    ..........................................................................................................................................  Patient/Patient Representative Signature      ..........................................................................................................................................  Patient Representative Print Name and Relationship to Patient    ..................................................               ................................................  Date                                   Time    ..........................................................................................................................................  Reviewed by Signature/Title    ...................................................              ..............................................  Date                                               Time          22EPIC Rev 08/18

## 2020-06-30 NOTE — ED TRIAGE NOTES
Pt in for complaints of low back pain ongoing for the last month and left leg pain ongoing since last night. Pt has taken Ibuprofen with some relief. Ambulatory to triage with some discomfort.

## 2020-06-30 NOTE — ED PROVIDER NOTES
History     Chief Complaint   Patient presents with     Back Pain     Leg Pain     HPI  Jason Duncan is a 50 year old male who presents ambulatory to  for low back pain. He has had bilateral low back pain x 1 month with pain starting to radiate down his left leg about 2 days ago.  He also reports numbness and tingling down his left leg.  No known injury or history of back problems. Denies saddle anesthesia, urinary retention, bowel or bladder incontinence. He has been taking ibuprofen with minimal effectiveness. Last took ibuprofen around 0630.     Allergies:  No Known Allergies    Problem List:    Patient Active Problem List    Diagnosis Date Noted     Primary osteoarthritis of left knee 02/21/2020     Priority: Medium     Hyperlipidemia LDL goal <100 09/16/2019     Priority: Medium     Primary insomnia 09/16/2019     Priority: Medium     Chronic gout of multiple sites 09/16/2019     Priority: Medium     Type 2 diabetes mellitus without complication, without long-term current use of insulin (H) 07/10/2018     Priority: Medium     Vitamin D deficiency 04/17/2018     Priority: Medium     RACHELE (obstructive sleep apnea) 05/30/2017     Priority: Medium     Morbid obesity due to excess calories (H) 09/07/2016     Priority: Medium     Encounter for monitoring statin therapy 06/28/2016     Priority: Medium     Advance Care Planning 6/28/2016: ACP Review of Chart / Resources Provided:  Reviewed chart for advance care plan.  Jason Duncan has no plan or code status on file. Discussed available resources and provided with information. Confirmed code status reflects current choices pending further ACP discussions.  Confirmed/documented legally designated decision makers.  Added by Phyllis Olvera             Gastric band slippage 09/01/2015     Priority: Medium     Mixed hearing loss, unilateral 11/20/2013     Priority: Medium     Status post bariatric surgery 09/25/2008     Priority: Medium     Benign essential  hypertension 06/09/2008     Priority: Medium        Past Medical History:    Past Medical History:   Diagnosis Date     Allergic rhinitis      Benign essential hypertension 6/9/2008     Chronic gout of multiple sites 9/16/2019     Cramp of limb 4/17/2018     Hyperlipidemia LDL goal <100 9/16/2019     RACHELE (obstructive sleep apnea)      Primary insomnia 9/16/2019     Primary osteoarthritis of left knee 2/21/2020     Type 2 diabetes mellitus without complication, without long-term current use of insulin (H) 7/10/2018     Vitamin D deficiency 4/17/2018       Past Surgical History:    Past Surgical History:   Procedure Laterality Date     GASTRIC RESTRICTIVE PROCEDURE, OPEN, REMOVE/REPLACE SUBCUTANEOUS PORT  2008     GI SURGERY  2015    removal of lap band      lap band  2008     nasal septoplasty       SEPTOPLASTY      Nasal       Family History:    Family History   Problem Relation Age of Onset     Dementia Mother 72        Cause of death     Diabetes Father      Respiratory Father         COPD     Cardiovascular Father         CHF       Social History:  Marital Status:   [2]  Social History     Tobacco Use     Smoking status: Never Smoker     Smokeless tobacco: Never Used   Substance Use Topics     Alcohol use: Yes     Comment: rarely     Drug use: No        Medications:    amLODIPine (NORVASC) 10 MG tablet  aspirin 81 MG tablet  cyclobenzaprine (FLEXERIL) 10 MG tablet  lisinopril 10 MG PO tablet  metFORMIN (GLUCOPHAGE-XR) 500 MG 24 hr tablet  montelukast (SINGULAIR) 10 MG tablet  multivitamin, therapeutic with minerals (MULTI-VITAMIN) TABS  simvastatin (ZOCOR) 10 MG tablet  VITAMIN D, CHOLECALCIFEROL, PO  cetirizine (ZYRTEC) 10 MG tablet  ibuprofen 800 MG PO tablet  loratadine (CLARITIN) 10 MG tablet  order for DME          Review of Systems   Musculoskeletal: Positive for back pain. Negative for gait problem.   All other systems reviewed and are negative.      Physical Exam   BP: 132/78  Pulse: 69  Temp: 97   F (36.1  C)  Resp: 18  SpO2: 97 %      Physical Exam  Vitals signs and nursing note reviewed.   Constitutional:       Appearance: Normal appearance. He is obese. He is not ill-appearing or toxic-appearing.   HENT:      Head: Normocephalic.   Eyes:      Pupils: Pupils are equal, round, and reactive to light.   Neck:      Musculoskeletal: Normal range of motion.   Cardiovascular:      Rate and Rhythm: Normal rate and regular rhythm.      Heart sounds: Normal heart sounds.   Pulmonary:      Effort: Pulmonary effort is normal.      Breath sounds: Normal breath sounds. No wheezing.   Musculoskeletal:      Lumbar back: He exhibits decreased range of motion and tenderness. He exhibits no edema.        Back:       Comments: Bilateral low back tenderness to palpation.  No step-offs or edema.  Negative straight leg raise test.  Pain and decreased ROM with forward flexion of 45 degrees.  Right lateral flexion 30 degrees left lateral flexion 30 degrees.  Left and right rotation about 20 degrees.   Skin:     General: Skin is warm and dry.      Capillary Refill: Capillary refill takes less than 2 seconds.   Neurological:      Mental Status: He is alert and oriented to person, place, and time.         ED Course        Procedures               Results for orders placed or performed during the hospital encounter of 06/30/20 (from the past 24 hour(s))   Lumbar spine XR, 2-3 views    Narrative    XR LUMBAR SPINE 2-3 VIEWS    HISTORY: Back pain, left leg numbness. 1 month. no known injury. .    TECHNIQUE: 3 views of the lumbosacral spine.    COMPARISON: None available.    FINDINGS:    Mild vertebral body height loss at T12 is most likely  developmental/chronic. Lumbar vertebral body heights and alignment are  preserved. There is partial lumbarization of the S1 vertebral body.  Moderate facet degeneration is seen at L5-S1.        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 Moderate L5-S1 facet degeneration.  Consider MR  for further assessment.      REGINA BUSTOS MD       Medications   ketorolac (TORADOL) injection 60 mg (60 mg Intramuscular Given 6/30/20 9573)       Assessments & Plan (with Medical Decision Making)     Bilateral low back tenderness to palpation.  No step-offs or edema.  Negative straight leg raise test. Pain and decreased ROM with forward flexion of 45 degrees.  Right lateral flexion 30 degrees left lateral flexion 30 degrees.  Left and right rotation about 20 degrees.  Lumbar x-ray shows the transitional anatomy at S1.  Discussed findings with patient.  Robaxin as prescribed.  Apply cold compresses to back for 12 to 24 hours then start alternating with heat.  Continue taking ibuprofen or Tylenol as needed for pain.  Can consider chiropractor or massage therapy as per your comfort.  Follow-up with PCP in 2 weeks if no improvement in symptoms.  Return to emergency department for worsening concerning symptoms.  Patient verbalized understanding.    I have reviewed the nursing notes.    I have reviewed the findings, diagnosis, plan and need for follow up with the patient.      New Prescriptions    No medications on file       Final diagnoses:   Bilateral low back pain with left-sided sciatica       6/30/2020   [facility]     Jonny, Tami, CNP  07/03/20 4059

## 2020-06-30 NOTE — DISCHARGE INSTRUCTIONS
Do not take any ibuprofen for 6 hours.  Apply ice for the first 12 to 24 hours then can alternate with heat.      Continue taking ibuprofen or Tylenol as needed for the pain. Take muscle relaxant as prescribed.    Follow-up with your doctor in 2 weeks if no improvement in symptoms.    Return to emergency department for worsening concerning symptoms.

## 2020-07-01 ENCOUNTER — TELEPHONE (OUTPATIENT)
Dept: FAMILY MEDICINE | Facility: OTHER | Age: 50
End: 2020-07-01

## 2020-07-01 DIAGNOSIS — M54.42 ACUTE LEFT-SIDED LOW BACK PAIN WITH LEFT-SIDED SCIATICA: Primary | ICD-10-CM

## 2020-07-01 NOTE — TELEPHONE ENCOUNTER
11:38 AM    Reason for Call: Phone Call    Description: Pt is requesting a referral to PT for sciatica. Please call pt back and let him know if this is possible. Thank you.    Was an appointment offered for this call? No  If yes : Appointment type              Date    Preferred method for responding to this message: Telephone Call  What is your phone number ? [phone]    If we cannot reach you directly, may we leave a detailed response at the number you provided? Yes    Can this message wait until your PCP/provider returns, if available today? YES    Nel Meier

## 2020-07-02 NOTE — TELEPHONE ENCOUNTER
Called patient and informed referral has been placed and PT department will call to schedule. 7/2 Roel

## 2020-07-03 ENCOUNTER — HOSPITAL ENCOUNTER (EMERGENCY)
Facility: HOSPITAL | Age: 50
Discharge: HOME | End: 2020-07-03
Attending: NURSE PRACTITIONER | Admitting: NURSE PRACTITIONER
Payer: COMMERCIAL

## 2020-07-03 ENCOUNTER — APPOINTMENT (OUTPATIENT)
Dept: ULTRASOUND IMAGING | Facility: HOSPITAL | Age: 50
End: 2020-07-03
Attending: NURSE PRACTITIONER
Payer: COMMERCIAL

## 2020-07-03 VITALS
OXYGEN SATURATION: 95 % | RESPIRATION RATE: 16 BRPM | DIASTOLIC BLOOD PRESSURE: 100 MMHG | SYSTOLIC BLOOD PRESSURE: 169 MMHG | TEMPERATURE: 98.3 F

## 2020-07-03 DIAGNOSIS — M79.662 PAIN OF LEFT CALF: Primary | ICD-10-CM

## 2020-07-03 LAB
ANION GAP SERPL CALCULATED.3IONS-SCNC: 3 MMOL/L (ref 3–14)
BUN SERPL-MCNC: 16 MG/DL (ref 7–30)
CALCIUM SERPL-MCNC: 9.6 MG/DL (ref 8.5–10.1)
CHLORIDE SERPL-SCNC: 105 MMOL/L (ref 94–109)
CO2 SERPL-SCNC: 30 MMOL/L (ref 20–32)
CREAT SERPL-MCNC: 0.98 MG/DL (ref 0.66–1.25)
GFR SERPL CREATININE-BSD FRML MDRD: >90 ML/MIN/{1.73_M2}
GLUCOSE SERPL-MCNC: 109 MG/DL (ref 70–99)
MAGNESIUM SERPL-MCNC: 2.1 MG/DL (ref 1.6–2.3)
POTASSIUM SERPL-SCNC: 4.3 MMOL/L (ref 3.4–5.3)
SODIUM SERPL-SCNC: 138 MMOL/L (ref 133–144)

## 2020-07-03 PROCEDURE — 93971 EXTREMITY STUDY: CPT | Mod: TC,LT

## 2020-07-03 PROCEDURE — G0463 HOSPITAL OUTPT CLINIC VISIT: HCPCS | Mod: 25

## 2020-07-03 PROCEDURE — 36415 COLL VENOUS BLD VENIPUNCTURE: CPT | Performed by: NURSE PRACTITIONER

## 2020-07-03 PROCEDURE — 80048 BASIC METABOLIC PNL TOTAL CA: CPT | Performed by: NURSE PRACTITIONER

## 2020-07-03 PROCEDURE — 99213 OFFICE O/P EST LOW 20 MIN: CPT | Mod: Z6 | Performed by: NURSE PRACTITIONER

## 2020-07-03 PROCEDURE — 83735 ASSAY OF MAGNESIUM: CPT | Performed by: NURSE PRACTITIONER

## 2020-07-03 RX ORDER — HYDROCODONE BITARTRATE AND ACETAMINOPHEN 5; 325 MG/1; MG/1
1 TABLET ORAL EVERY 6 HOURS PRN
Qty: 6 TABLET | Refills: 0 | Status: SHIPPED | OUTPATIENT
Start: 2020-07-03 | End: 2020-07-13

## 2020-07-03 ASSESSMENT — ENCOUNTER SYMPTOMS
NUMBNESS: 0
DIFFICULTY URINATING: 0
WEAKNESS: 0
COLOR CHANGE: 0
BACK PAIN: 0
FEVER: 0
ABDOMINAL PAIN: 0
ARTHRALGIAS: 1
CHILLS: 0
WOUND: 0

## 2020-07-03 NOTE — DISCHARGE INSTRUCTIONS
(V68.937) Pain of left calf  (primary encounter diagnosis)  50-year-old male presents ambulatory for concerns of left calf pain.  He was seen in the ED on Wednesday for low back pain with left-sided sciatica.  The low back pain has resolved but he is continuing to have left calf pain.  Pain increases with weightbearing, walking.  He has had no recent injuries, traumas.  Denies history of DVT or PE.  He denies shortness of breath or chest pain.  No acute electrolyte abnormalities. US is negative for DVT. Discussed muscular and/or tendonitis as potential causes of symptoms.   Recommend:  - Gentle stretches to left left  - Ice and/or heat   - Topical anesthetic such as lidocaine, biofreeze, icy hot  - acetaminophen 500-650 mg every 4-6 hours  - ibuprofen 800 mg with food every 8 hours  Alternate- for example - 8 am ibuprofen, 12 pm acetaminophen, 4 pm ibuprofen, 8 pm acetaminophen  - can use hydrocodone for severe pain    Narcotics are intended for short term use of acute pain.  They are not typically indicated for long term use or treatment of chronic pain due to risks and side effects.  Ensure you are taking your narcotic prescription as prescribed and using sparingly after tryin.) non-pharmacologic (Rest, Ice and/or Heat, Compression, Elevation, Massage, Topical anesthetic such as Bengay, Biofreeze, lidocaine, etc, chiropractor, support braces, others) and   2.) non-narcotic analgesics such as Tylenol 500-650 mg four times daily and/or ibuprofen 800 mg every 8 hours with food to avoid stomach upset. If you have ever been told to avoid acetaminophen (Tylenol) due to liver disease or nonsteroidal antiinflammatories (NASIDs) due to kidney disease, gastritis, stomach ulcers, etc. Avoid use.  Avoid operating vehicle/heavy equipment while taking narcotics. Also avoid drinking alcohol and using elicit substances while taking narcotics as doing so may increase your risk of side effects including respiratory depression  which can lead to death.  Common side effects of narcotics include but are not limited to: constipation, nausea, vomiting, dizziness, sedation.         RETURN TO THE ED WITH NEW OR WORSENING SYMPTOMS.    FOLLOW-UP WITH YOUR PRIMARY CARE PROVIDER IN 7-10 DAYS IF NO IMPROVEMENT.       Leslie Cortes CNP        Results for orders placed or performed during the hospital encounter of 07/03/20   US Lower Extremity Venous Duplex Left     Status: None    Narrative    Exam:US LOWER EXTREMITY VENOUS DUPLEX LEFT    History: Left leg pain    Comparisons: None    Technique: Venous duplex ultrasonography of the left lower extremity  was performed.     Findings: The common femoral vein, superficial femoral vein and  popliteal vein are fully compressible with spontaneous and augmentable  venous flow.           Impression    Impression: No evidence of deep venous thrombosis within the left  lower extremity.    KINGA GONZALEZ MD   Basic metabolic panel     Status: Abnormal   Result Value Ref Range    Sodium 138 133 - 144 mmol/L    Potassium 4.3 3.4 - 5.3 mmol/L    Chloride 105 94 - 109 mmol/L    Carbon Dioxide 30 20 - 32 mmol/L    Anion Gap 3 3 - 14 mmol/L    Glucose 109 (H) 70 - 99 mg/dL    Urea Nitrogen 16 7 - 30 mg/dL    Creatinine 0.98 0.66 - 1.25 mg/dL    GFR Estimate >90 >60 mL/min/[1.73_m2]    GFR Estimate If Black >90 >60 mL/min/[1.73_m2]    Calcium 9.6 8.5 - 10.1 mg/dL   Magnesium     Status: None   Result Value Ref Range    Magnesium 2.1 1.6 - 2.3 mg/dL

## 2020-07-03 NOTE — ED TRIAGE NOTES
Patient presents with worsening left ankle, left lower leg pain.  States he was seen earlier in the week and told to come back if it got any worse.  Unable to see primary until 7/8/2020.

## 2020-07-03 NOTE — ED AVS SNAPSHOT
[facility]  [address] [address]  Phone:  [phone]                                    Jason Duncan   MRN: [id number]    Department:  [facility]   Date of Visit:  7/3/2020           After Visit Summary Signature Page    I have received my discharge instructions, and my questions have been answered. I have discussed any challenges I see with this plan with the nurse or doctor.    ..........................................................................................................................................  Patient/Patient Representative Signature      ..........................................................................................................................................  Patient Representative Print Name and Relationship to Patient    ..................................................               ................................................  Date                                   Time    ..........................................................................................................................................  Reviewed by Signature/Title    ...................................................              ..............................................  Date                                               Time          22EPIC Rev 08/18

## 2020-07-03 NOTE — ED TRIAGE NOTES
Pt I here with c/o lower left leg pain  Pt notes he is unsure of what happened  Pt notes left lower leg was swollen last night but not today, pt has been icing and heating and resting  No known injury

## 2020-07-03 NOTE — ED PROVIDER NOTES
History     Chief Complaint   Patient presents with     Ankle Pain     HPI  Jason Duncan is a 50 year old male who presents ambulatory with concerns of left leg pain. He was evaluated earlier this week for back pain radiating down left leg. Back pain has resolved but left leg pain continues. Difficulty walking due to pain.   Currently 10/10 left leg pain, constant. Pain increases with walking, movement.   He had associated swelling last night which has resolved this morning. Denies color changes- redness, warmth.   No known injuries or trauma. He works as  and does a lot of walking and painting.         Allergies:  No Known Allergies    Problem List:    Patient Active Problem List    Diagnosis Date Noted     Primary osteoarthritis of left knee 02/21/2020     Priority: Medium     Hyperlipidemia LDL goal <100 09/16/2019     Priority: Medium     Primary insomnia 09/16/2019     Priority: Medium     Chronic gout of multiple sites 09/16/2019     Priority: Medium     Type 2 diabetes mellitus without complication, without long-term current use of insulin (H) 07/10/2018     Priority: Medium     Vitamin D deficiency 04/17/2018     Priority: Medium     RACHELE (obstructive sleep apnea) 05/30/2017     Priority: Medium     Morbid obesity due to excess calories (H) 09/07/2016     Priority: Medium     Encounter for monitoring statin therapy 06/28/2016     Priority: Medium     Advance Care Planning 6/28/2016: ACP Review of Chart / Resources Provided:  Reviewed chart for advance care plan.  Jason Duncan has no plan or code status on file. Discussed available resources and provided with information. Confirmed code status reflects current choices pending further ACP discussions.  Confirmed/documented legally designated decision makers.  Added by Phyllis Olvera             Gastric band slippage 09/01/2015     Priority: Medium     Mixed hearing loss, unilateral 11/20/2013     Priority: Medium     Status post bariatric  surgery 09/25/2008     Priority: Medium     Benign essential hypertension 06/09/2008     Priority: Medium        Past Medical History:    Past Medical History:   Diagnosis Date     Allergic rhinitis      Benign essential hypertension 6/9/2008     Chronic gout of multiple sites 9/16/2019     Cramp of limb 4/17/2018     Hyperlipidemia LDL goal <100 9/16/2019     RACHELE (obstructive sleep apnea)      Primary insomnia 9/16/2019     Primary osteoarthritis of left knee 2/21/2020     Type 2 diabetes mellitus without complication, without long-term current use of insulin (H) 7/10/2018     Vitamin D deficiency 4/17/2018       Past Surgical History:    Past Surgical History:   Procedure Laterality Date     GASTRIC RESTRICTIVE PROCEDURE, OPEN, REMOVE/REPLACE SUBCUTANEOUS PORT  2008     GI SURGERY  2015    removal of lap band      lap band  2008     nasal septoplasty       SEPTOPLASTY      Nasal       Family History:    Family History   Problem Relation Age of Onset     Dementia Mother 72        Cause of death     Diabetes Father      Respiratory Father         COPD     Cardiovascular Father         CHF       Social History:  Marital Status:   [2]  Social History     Tobacco Use     Smoking status: Never Smoker     Smokeless tobacco: Never Used   Substance Use Topics     Alcohol use: Yes     Comment: rarely     Drug use: No        Medications:    HYDROcodone-acetaminophen (NORCO) 5-325 MG tablet  amLODIPine (NORVASC) 10 MG tablet  aspirin 81 MG tablet  cetirizine (ZYRTEC) 10 MG tablet  cyclobenzaprine (FLEXERIL) 10 MG tablet  ibuprofen 800 MG PO tablet  lisinopril 10 MG PO tablet  loratadine (CLARITIN) 10 MG tablet  metFORMIN (GLUCOPHAGE-XR) 500 MG 24 hr tablet  methocarbamol (ROBAXIN) 750 MG tablet  montelukast (SINGULAIR) 10 MG tablet  multivitamin, therapeutic with minerals (MULTI-VITAMIN) TABS  order for DME  simvastatin (ZOCOR) 10 MG tablet  VITAMIN D, CHOLECALCIFEROL, PO          Review of Systems   Constitutional:  Negative for chills and fever.   Gastrointestinal: Negative for abdominal pain.   Genitourinary: Negative for difficulty urinating.   Musculoskeletal: Positive for arthralgias (left calf, left ankle). Negative for back pain.   Skin: Negative for color change, rash and wound.   Neurological: Negative for weakness (of left leg) and numbness.       Physical Exam   BP: 169/100  Heart Rate: 73  Temp: 98.3  F (36.8  C)  Resp: 16  SpO2: 95 %      Physical Exam  Constitutional:       General: He is not in acute distress.  Cardiovascular:      Rate and Rhythm: Normal rate and regular rhythm.      Pulses:           Dorsalis pedis pulses are 2+ on the left side.        Posterior tibial pulses are 2+ on the left side.      Heart sounds: S1 normal and S2 normal. No murmur. No friction rub.   Pulmonary:      Effort: Pulmonary effort is normal.      Breath sounds: Normal breath sounds. No wheezing, rhonchi or rales.   Musculoskeletal:      Left hip: He exhibits normal range of motion and no tenderness.      Left knee: He exhibits normal range of motion and no swelling. No tenderness found.      Left ankle: He exhibits normal range of motion. No tenderness.      Right lower leg: No edema.      Left lower leg: No edema.        Legs:    Feet:      Left foot:      Skin integrity: Skin integrity normal.   Skin:     General: Skin is warm and dry.      Capillary Refill: Capillary refill takes less than 2 seconds.   Neurological:      Mental Status: He is alert and oriented to person, place, and time.      Gait: Gait is intact.   Psychiatric:         Mood and Affect: Mood normal.         Speech: Speech normal.         Behavior: Behavior normal. Behavior is cooperative.         ED Course        Procedures      Results for orders placed or performed during the hospital encounter of 07/03/20 (from the past 24 hour(s))   Basic metabolic panel   Result Value Ref Range    Sodium 138 133 - 144 mmol/L    Potassium 4.3 3.4 - 5.3 mmol/L    Chloride  105 94 - 109 mmol/L    Carbon Dioxide 30 20 - 32 mmol/L    Anion Gap 3 3 - 14 mmol/L    Glucose 109 (H) 70 - 99 mg/dL    Urea Nitrogen 16 7 - 30 mg/dL    Creatinine 0.98 0.66 - 1.25 mg/dL    GFR Estimate >90 >60 mL/min/[1.73_m2]    GFR Estimate If Black >90 >60 mL/min/[1.73_m2]    Calcium 9.6 8.5 - 10.1 mg/dL   Magnesium   Result Value Ref Range    Magnesium 2.1 1.6 - 2.3 mg/dL   US Lower Extremity Venous Duplex Left    Narrative    Exam:US LOWER EXTREMITY VENOUS DUPLEX LEFT    History: Left leg pain    Comparisons: None    Technique: Venous duplex ultrasonography of the left lower extremity  was performed.     Findings: The common femoral vein, superficial femoral vein and  popliteal vein are fully compressible with spontaneous and augmentable  venous flow.           Impression    Impression: No evidence of deep venous thrombosis within the left  lower extremity.    KINGA GONZALEZ MD       Medications - No data to display    Assessments & Plan (with Medical Decision Making)     I have reviewed the nursing notes.    I have reviewed the findings, diagnosis, plan and need for follow up with the patient.  (M79.731) Pain of left calf  (primary encounter diagnosis)  50-year-old male presents ambulatory for concerns of left calf pain.  He was seen in the ED on Wednesday for low back pain with left-sided sciatica.  The low back pain has resolved but he is continuing to have left calf pain.  Pain increases with weightbearing, walking.  He has had no recent injuries, traumas.  Denies history of DVT or PE.  He denies shortness of breath or chest pain.  No acute electrolyte abnormalities. US is negative for DVT. Discussed muscular and/or tendonitis as potential causes of symptoms.   Recommend:  - Gentle stretches to left left  - Ice and/or heat   - Topical anesthetic such as lidocaine, biofreeze, icy hot  - acetaminophen 500-650 mg every 4-6 hours  - ibuprofen 800 mg with food every 8 hours  Alternate- for example - 8 am  ibuprofen, 12 pm acetaminophen, 4 pm ibuprofen, 8 pm acetaminophen  - can use hydrocodone for severe pain      RETURN TO THE ED WITH NEW OR WORSENING SYMPTOMS.    FOLLOW-UP WITH YOUR PRIMARY CARE PROVIDER IN 7-10 DAYS IF NO IMPROVEMENT.       Leslie Cortes CNP    Discharge Medication List as of 7/3/2020 12:37 PM      START taking these medications    Details   HYDROcodone-acetaminophen (NORCO) 5-325 MG tablet Take 1 tablet by mouth every 6 hours as needed for severe pain, Disp-6 tablet,R-0, E-Prescribe             Final diagnoses:   Pain of left calf       7/3/2020   [facility]     Leslie Cortes CNP  07/03/20 0529

## 2020-07-09 NOTE — PROGRESS NOTES
Jason Duncan is a 50 year old male who presents to clinic today for the following health issues:        ED/UC Followup:  Facility:  [facility] [place]  Date of visit: 7/3/2020  Reason for visit: Pain in Left Calf  Current Status: Pain has moved to mostly in ankle area.         He feels he is improved, went to PT, pain I now isolated to lateral ankle        Exam:US LOWER EXTREMITY VENOUS DUPLEX LEFT     History: Left leg pain     Comparisons: None     Technique: Venous duplex ultrasonography of the left lower extremity  was performed.      Findings: The common femoral vein, superficial femoral vein and  popliteal vein are fully compressible with spontaneous and augmentable  venous flow.                                                                        Impression: No evidence of deep venous thrombosis within the left  lower extremity.     KINGA GONZALEZ MD        XR LUMBAR SPINE 2-3 VIEWS     HISTORY: Back pain, left leg numbness. 1 month. no known injury. .     TECHNIQUE: 3 views of the lumbosacral spine.     COMPARISON: None available.     FINDINGS:     Mild vertebral body height loss at T12 is most likely  developmental/chronic. Lumbar vertebral body heights and alignment are  preserved. There is partial lumbarization of the S1 vertebral body.  Moderate facet degeneration is seen at L5-S1.                                                                        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 Moderate L5-S1 facet degeneration.  Consider MR for further assessment.       REGINA BUSTOS MD        Diabetes Follow-up    How often are you checking your blood sugar? Not at all    What concerns do you have today about your diabetes? None     Do you have any of these symptoms? (Select all that apply)  No numbness or tingling in feet.  No redness, sores or blisters on feet.  No complaints of excessive thirst.  No reports of blurry vision.  No significant changes to weight.      Hyperlipidemia Follow-Up    Are you  regularly taking any medication or supplement to lower your cholesterol?   Yes- statin    Are you having muscle aches or other side effects that you think could be caused by your cholesterol lowering medication?  No      Hypertension Follow-up    Do you check your blood pressure regularly outside of the clinic? No     Are you following a low salt diet? No    Are your blood pressures ever more than 140 on the top number (systolic) OR more   than 90 on the bottom number (diastolic), for example 140/90? Yes      BP Readings from Last 2 Encounters:   07/13/20 (!) 142/74   07/03/20 169/100     Hemoglobin A1C (%)   Date Value   10/31/2019 6.5 (H)   02/08/2019 6.8 (H)     LDL Cholesterol Calculated (mg/dL)   Date Value   10/31/2019 92   02/08/2019 145 (H)         Patient Active Problem List   Diagnosis     Benign essential hypertension     Mixed hearing loss, unilateral     Encounter for monitoring statin therapy     Morbid obesity due to excess calories (H)     RACHELE (obstructive sleep apnea)     Vitamin D deficiency     Type 2 diabetes mellitus without complication, without long-term current use of insulin (H)     Gastric band slippage     Status post bariatric surgery     Hyperlipidemia LDL goal <100     Primary insomnia     Chronic gout of multiple sites     Primary osteoarthritis of left knee     Past Surgical History:   Procedure Laterality Date     GASTRIC RESTRICTIVE PROCEDURE, OPEN, REMOVE/REPLACE SUBCUTANEOUS PORT  2008     GI SURGERY  2015    removal of lap band      lap band  2008     nasal septoplasty       SEPTOPLASTY      Nasal       Social History     Tobacco Use     Smoking status: Never Smoker     Smokeless tobacco: Never Used   Substance Use Topics     Alcohol use: Yes     Comment: rarely     Family History   Problem Relation Age of Onset     Dementia Mother 72        Cause of death     Diabetes Father      Respiratory Father         COPD     Cardiovascular Father         CHF           Current Outpatient  Medications   Medication Sig Dispense Refill     amLODIPine (NORVASC) 10 MG tablet Take 1 tablet (10 mg) by mouth daily 90 tablet 1     aspirin 81 MG tablet Take by mouth daily       cetirizine (ZYRTEC) 10 MG tablet TAKE 1 TABLET BY MOUTH EVERY EVENING 90 tablet 3     cyclobenzaprine (FLEXERIL) 10 MG tablet TAKE 1 TABLET BY MOUTH NIGHTLY AS NEEDED FOR MUSCLE SPASMS 90 tablet 3     ibuprofen 800 MG PO tablet Take 1 tablet (800 mg) by mouth every 8 hours as needed for moderate pain 90 tablet 1     lisinopril 10 MG PO tablet 1.5 tabs daily for 15 mg 90 tablet 1     loratadine (CLARITIN) 10 MG tablet TAKE 1 TABLET BY MOUTH DAILY 30 tablet 5     metFORMIN (GLUCOPHAGE-XR) 500 MG 24 hr tablet TAKE 2 TABLETS BY MOUTH 2 TIMES DAILY WITH MEALS 120 tablet 3     methocarbamol (ROBAXIN) 750 MG tablet Take 1 tablet (750 mg) by mouth 3 times daily as needed for muscle spasms 21 tablet 0     montelukast (SINGULAIR) 10 MG tablet Take 1 tablet (10 mg) by mouth At Bedtime 90 tablet 3     multivitamin, therapeutic with minerals (MULTI-VITAMIN) TABS Take 1 tablet by mouth daily       order for DME c-pap mask and supplies    DX: G47.33, sleep apnea      RACHELE - uses CPAP  Stable, does well with CPAP  Is due for annual supply renewal  Length of need:  Lifetime  Face to face visit - 2/21/2020 1 Device 11     simvastatin (ZOCOR) 10 MG tablet TAKE 1 TABLET BY MOUTH DAILY AT BEDTIME 90 tablet 0     VITAMIN D, CHOLECALCIFEROL, PO Take 5,000 Units by mouth daily        No Known Allergies  Recent Labs   Lab Test 07/03/20  1126 10/31/19  0931 02/08/19  1026 08/07/18  0940  01/10/17  1043   A1C  --  6.5* 6.8* 6.9*   < >  --    LDL  --  92 145* 138*  --   --    HDL  --  39* 42 41  --   --    TRIG  --  182* 123 131  --   --    ALT  --   --  97* 102*  --  83*   CR 0.98  --  1.08 1.06   < > 1.05   GFRESTIMATED >90  --  81 75   < > 76   GFRESTBLACK >90  --  >90 >90   < > >90  African American GFR Calc     POTASSIUM 4.3  --  4.4 3.8   < > 3.6   TSH  --   1.10 1.08 1.42   < >  --     < > = values in this interval not displayed.      BP Readings from Last 3 Encounters:   07/13/20 (!) 142/74   07/03/20 169/100   06/30/20 132/78    Wt Readings from Last 3 Encounters:   07/13/20 (!) 153.8 kg (339 lb)   02/21/20 (!) 156 kg (344 lb)   02/07/20 (!) 159 kg (350 lb 8.5 oz)              Reviewed and updated as needed this visit by Provider         Review of Systems   Constitutional, HEENT, cardiovascular, pulmonary, gi and gu systems are negative, except as otherwise noted.          Objective    BP (!) 142/74 (BP Location: Right arm, Patient Position: Sitting, Cuff Size: Adult Large)   Pulse 86   Temp 97.2  F (36.2  C)   Resp 16   Wt (!) 153.8 kg (339 lb)   SpO2 98%   BMI 43.53 kg/m    Body mass index is 43.53 kg/m .       Physical Exam   GENERAL: healthy, alert and no distress  EYES: Eyes grossly normal to inspection, PERRL and conjunctivae and sclerae normal  HENT: ear canals and TM's normal, nose and mouth without ulcers or lesions  NECK: no adenopathy, no asymmetry, masses, or scars and thyroid normal to palpation  RESP: lungs clear to auscultation - no rales, rhonchi or wheezes  CV: regular rate and rhythm, normal S1 S2, no S3 or S4, no murmur, click or rub, no peripheral edema and peripheral pulses strong  MS: left lateral ankle - nerve pain  SKIN: no suspicious lesions or rashes  PSYCH: mentation appears normal, affect normal/bright          Assessment & Plan     1. Benign essential hypertension  - UA reflex to Microscopic  - TSH with free T4 reflex  - Comprehensive metabolic panel    2. Hyperlipidemia LDL goal <100  - Lipid Profile  - Comprehensive metabolic panel    3. Type 2 diabetes mellitus without complication, without long-term current use of insulin (H)  - UA reflex to Microscopic  - Albumin Random Urine Quantitative with Creat Ratio  - TSH with free T4 reflex  - Comprehensive metabolic panel  - Hemoglobin A1c    4. Encounter for monitoring statin  therapy  - Comprehensive metabolic panel         Nurse only BP check 2-4 weeks      Return in about 6 months (around 1/13/2021).    Samira Whitney CNP  [facility] [place] [facility]

## 2020-07-10 ENCOUNTER — HOSPITAL ENCOUNTER (OUTPATIENT)
Dept: PHYSICAL THERAPY | Facility: HOSPITAL | Age: 50
Setting detail: THERAPIES SERIES
End: 2020-07-10
Attending: FAMILY MEDICINE
Payer: COMMERCIAL

## 2020-07-10 DIAGNOSIS — M54.42 ACUTE LEFT-SIDED LOW BACK PAIN WITH LEFT-SIDED SCIATICA: ICD-10-CM

## 2020-07-10 PROCEDURE — 97161 PT EVAL LOW COMPLEX 20 MIN: CPT | Mod: GP

## 2020-07-10 NOTE — PROGRESS NOTES
Initial Physical Therapy Evaluations       Name: Jason Duncan MRN# [id number]   Age: 50 year old YOB: 1970     Date of Consultation: July 10, 2020  Primary care provider: Samira Whitney    Referring Physician: Meenu Shay MD  Orders: Eval and Treat  Medical Diagnosis: Low Back Pain   Onset of Illness/Injury: 7/10/20     Reason for PT Visit: Patient presents w/ low back pain following walking around the block.  Patient couldn't sit or walk for 5 minutes 2 weeks ago, now feeling 100 % better.   As of this date, patient is no longer having back pain or calf pain.  He is feeling good at this time and would like to proceed with a home exercise program.    Prior Level of Function: Independent     Community Support/Living Environment/Employment History: works as a , not a lot of bending or physical labor as he has 21 years with the company, lives independently      Patient/Family Goal: To have less back pain    Fall Screen:   Have you fallen 2 or more times in the last year? No  Have you fallen and had an injury in the last year? No  Timed up & go: n/a  Is patient a fall risk? No    Past Medical History:   Past Medical History:   Diagnosis Date     Allergic rhinitis      Benign essential hypertension 6/9/2008     Problem list name updated by automated process. Provider to review     Chronic gout of multiple sites 9/16/2019     Cramp of limb 4/17/2018     Hyperlipidemia LDL goal <100 9/16/2019     RACHELE (obstructive sleep apnea)      Primary insomnia 9/16/2019     Primary osteoarthritis of left knee 2/21/2020     Type 2 diabetes mellitus without complication, without long-term current use of insulin (H) 7/10/2018     Vitamin D deficiency 4/17/2018       Past Surgical History:  Past Surgical History:   Procedure Laterality Date     GASTRIC RESTRICTIVE PROCEDURE, OPEN, REMOVE/REPLACE SUBCUTANEOUS PORT  2008     GI SURGERY  2015    removal of lap band      lap band  2008     nasal septoplasty        SEPTOPLASTY      Nasal       Medications:   Current Outpatient Medications   Medication Sig     amLODIPine (NORVASC) 10 MG tablet Take 1 tablet (10 mg) by mouth daily     aspirin 81 MG tablet Take by mouth daily     cetirizine (ZYRTEC) 10 MG tablet TAKE 1 TABLET BY MOUTH EVERY EVENING     cyclobenzaprine (FLEXERIL) 10 MG tablet TAKE 1 TABLET BY MOUTH NIGHTLY AS NEEDED FOR MUSCLE SPASMS     HYDROcodone-acetaminophen (NORCO) 5-325 MG tablet Take 1 tablet by mouth every 6 hours as needed for severe pain     ibuprofen 800 MG PO tablet Take 1 tablet (800 mg) by mouth every 8 hours as needed for moderate pain     lisinopril 10 MG PO tablet 1.5 tabs daily for 15 mg     loratadine (CLARITIN) 10 MG tablet TAKE 1 TABLET BY MOUTH DAILY     metFORMIN (GLUCOPHAGE-XR) 500 MG 24 hr tablet TAKE 2 TABLETS BY MOUTH 2 TIMES DAILY WITH MEALS     methocarbamol (ROBAXIN) 750 MG tablet Take 1 tablet (750 mg) by mouth 3 times daily as needed for muscle spasms     montelukast (SINGULAIR) 10 MG tablet Take 1 tablet (10 mg) by mouth At Bedtime     multivitamin, therapeutic with minerals (MULTI-VITAMIN) TABS Take 1 tablet by mouth daily     order for DME c-pap mask and supplies    DX: G47.33, sleep apnea      RACHELE - uses CPAP  Stable, does well with CPAP  Is due for annual supply renewal  Length of need:  Lifetime  Face to face visit - 2/21/2020     simvastatin (ZOCOR) 10 MG tablet TAKE 1 TABLET BY MOUTH DAILY AT BEDTIME     VITAMIN D, CHOLECALCIFEROL, PO Take 5,000 Units by mouth daily      No current facility-administered medications for this encounter.        Imaging:     Musculoskeletal Findings:   Patient goals:      OBJECTIVE  Pain at rest: 0/10  Pain with activity: 1/10  Aggravating Factors: Standing, walking  Relieving Factors: rest        Pain with coughing: No  Pain with sneezing: No  Pain with straining: No  Change in bowel/bladder: No  Numbness or tingling in groin area: No    Palpation:      Gait: Normal    Assistive devices:       Functional mobility   Ambulates independently     Posture: Normal erect.  Sitting Posture: good  Standing Posture: good  Correction of posture: same     Neurological Assessment:   Reflexes - Right:   Patellar:   Achilles:   Reflexes - Left:   Patellar:   Achilles:      Myotomes:   Right lower extremity:negative  Left lower extremity: negative     Dermatomes:   Right lower extremity: negative  Left lower extremity: negative     Range of Motion:  Active Lumbar Spine       Flexion: WNL       Extension: WNL       Right sidebend: WNL       Left sidebend: WNL       Right rotation: WNL       Left rotation: WNL     Right Lower Extremity Range of Motion: within normal limits   Left Lower Extremity Range of Motion: within normal limits     Strength:  Abdominal Strength:   Right Lower Extremity Strength: 5/5   Left Lower Extremity Strength: 5/5      Special Tests:  Spine Special Tests:  Slump:  Left: neg              Right: neg  Straight Leg Raise:           Left:  neg     Right: neg  Traction:              Left:       Right:  Prone Knee Bend:            Left:       Right:  Compression:    Left:       Right:  Repeated Movement Testing:   neutral to repeated extension   Passive Intervertebral Accessory Movements:  not assessed - as patient pain free at this time  Prone Instability Test:            Hip Special Tests:                  Right      Left  Paul:          LISSA:    neg                Scour Test:                            Sammy Test:                       Leg Length:   equal                        Trendelenburg s Sign:                         Hip PROM:     L LE: WFLs     R LE: WFLs     Patient's Concordant Sign: walking, standing    Assessment - By time of appointment, patient has 100 % improvement in symptoms.  Currently doing very well, able to perform full work tasks w/o limitations.  No longer having pain w/ walking.  Instructed patient on HEP and low back stretching should pain return and given information  to return to PT as needed.          Outcome Measures:   Lawrence STarT Sub-Score (Q5-9): 0  Lawrence STarT Total Score (all 9): 3  Oswestry Score (%): 6 %     Goals:   1. Patient will refer to stretches as needed to reduce further need of ER visits for low back pain.  2. Patient will be able to walk and perform all work tasks w/o limitations.     Planned Interventions: Therapeutic Exercise, Therapeutic Activity, Manual Therapy, Mechanical Traction    Clinical Impressions:  Criteria for Skilled Therapeutic Intervention Met: yes  PT Diagnosis: Low back pain  Influenced by the following impairments: low back pain, decreased trunk mobility  Functional limitations due to impairment: walking, sitting, standing  Clinical presentation: Stable/Uncomplicated  Clinical presentation rationale: therapist discretion  Clinical Decision making (complexity): Low Complexity  Predicted Duration of Therapy Intervention (days/wks): 1-2x  Risks and Benefits of therapy have been explained: Yes  Patient, Family & other staff in agreement with plan of care: Yes  Comments: PT on hold as patient has significant improvement in symptoms.  Given HEP and education on course of low back pain should symptoms return.     Total Evaluation Time: 15

## 2020-07-13 ENCOUNTER — OFFICE VISIT (OUTPATIENT)
Dept: FAMILY MEDICINE | Facility: OTHER | Age: 50
End: 2020-07-13
Attending: NURSE PRACTITIONER
Payer: COMMERCIAL

## 2020-07-13 VITALS
OXYGEN SATURATION: 98 % | HEART RATE: 86 BPM | TEMPERATURE: 97.2 F | RESPIRATION RATE: 16 BRPM | SYSTOLIC BLOOD PRESSURE: 142 MMHG | WEIGHT: 315 LBS | BODY MASS INDEX: 43.53 KG/M2 | DIASTOLIC BLOOD PRESSURE: 74 MMHG

## 2020-07-13 DIAGNOSIS — I10 BENIGN ESSENTIAL HYPERTENSION: Primary | ICD-10-CM

## 2020-07-13 DIAGNOSIS — Z51.81 ENCOUNTER FOR MONITORING STATIN THERAPY: Chronic | ICD-10-CM

## 2020-07-13 DIAGNOSIS — E78.5 HYPERLIPIDEMIA LDL GOAL <100: ICD-10-CM

## 2020-07-13 DIAGNOSIS — E11.9 TYPE 2 DIABETES MELLITUS WITHOUT COMPLICATION, WITHOUT LONG-TERM CURRENT USE OF INSULIN (H): ICD-10-CM

## 2020-07-13 DIAGNOSIS — Z79.899 ENCOUNTER FOR MONITORING STATIN THERAPY: Chronic | ICD-10-CM

## 2020-07-13 LAB
ALBUMIN UR-MCNC: NEGATIVE MG/DL
APPEARANCE UR: CLEAR
BILIRUB UR QL STRIP: NEGATIVE
COLOR UR AUTO: YELLOW
GLUCOSE UR STRIP-MCNC: NEGATIVE MG/DL
HGB UR QL STRIP: NEGATIVE
KETONES UR STRIP-MCNC: NEGATIVE MG/DL
LEUKOCYTE ESTERASE UR QL STRIP: NEGATIVE
NITRATE UR QL: NEGATIVE
PH UR STRIP: 7 PH (ref 5–7)
SOURCE: NORMAL
SP GR UR STRIP: 1.01 (ref 1–1.03)
UROBILINOGEN UR STRIP-ACNC: 0.2 EU/DL (ref 0.2–1)

## 2020-07-13 PROCEDURE — 36415 COLL VENOUS BLD VENIPUNCTURE: CPT | Performed by: NURSE PRACTITIONER

## 2020-07-13 PROCEDURE — 80053 COMPREHEN METABOLIC PANEL: CPT | Performed by: NURSE PRACTITIONER

## 2020-07-13 PROCEDURE — 80061 LIPID PANEL: CPT | Performed by: NURSE PRACTITIONER

## 2020-07-13 PROCEDURE — 83036 HEMOGLOBIN GLYCOSYLATED A1C: CPT | Performed by: NURSE PRACTITIONER

## 2020-07-13 PROCEDURE — 82043 UR ALBUMIN QUANTITATIVE: CPT | Performed by: NURSE PRACTITIONER

## 2020-07-13 PROCEDURE — 99214 OFFICE O/P EST MOD 30 MIN: CPT | Performed by: NURSE PRACTITIONER

## 2020-07-13 PROCEDURE — 84443 ASSAY THYROID STIM HORMONE: CPT | Performed by: NURSE PRACTITIONER

## 2020-07-13 PROCEDURE — 81003 URINALYSIS AUTO W/O SCOPE: CPT | Performed by: NURSE PRACTITIONER

## 2020-07-13 ASSESSMENT — ANXIETY QUESTIONNAIRES

## 2020-07-13 ASSESSMENT — PAIN SCALES - GENERAL: PAINLEVEL: MODERATE PAIN (4)

## 2020-07-13 ASSESSMENT — PATIENT HEALTH QUESTIONNAIRE - PHQ9
SUM OF ALL RESPONSES TO PHQ QUESTIONS 1-9: 0
5. POOR APPETITE OR OVEREATING: NOT AT ALL

## 2020-07-13 NOTE — NURSING NOTE
"Chief Complaint   Patient presents with     Hospital F/U       Initial BP (!) 142/74 (BP Location: Right arm, Patient Position: Sitting, Cuff Size: Adult Large)   Pulse 86   Temp 97.2  F (36.2  C)   Resp 16   Wt (!) 153.8 kg (339 lb)   SpO2 98%   BMI 43.53 kg/m   Estimated body mass index is 43.53 kg/m  as calculated from the following:    Height as of 10/16/19: 1.88 m (6' 2\").    Weight as of this encounter: 153.8 kg (339 lb).  Medication Reconciliation: complete  Ashley A. Lechevalier, LPN  "

## 2020-07-13 NOTE — PATIENT INSTRUCTIONS
Assessment & Plan     1. Benign essential hypertension  - UA reflex to Microscopic  - TSH with free T4 reflex  - Comprehensive metabolic panel    2. Hyperlipidemia LDL goal <100  - Lipid Profile  - Comprehensive metabolic panel    3. Type 2 diabetes mellitus without complication, without long-term current use of insulin (H)  - UA reflex to Microscopic  - Albumin Random Urine Quantitative with Creat Ratio  - TSH with free T4 reflex  - Comprehensive metabolic panel  - Hemoglobin A1c    4. Encounter for monitoring statin therapy  - Comprehensive metabolic panel         Nurse only BP check 2-4 weeks      Return in about 6 months (around 1/13/2021).    Samira Whitney, LAILA  [facility]

## 2020-07-14 LAB
ALBUMIN SERPL-MCNC: 4.2 G/DL (ref 3.4–5)
ALP SERPL-CCNC: 78 U/L (ref 40–150)
ALT SERPL W P-5'-P-CCNC: 100 U/L (ref 0–70)
ANION GAP SERPL CALCULATED.3IONS-SCNC: 9 MMOL/L (ref 3–14)
AST SERPL W P-5'-P-CCNC: 51 U/L (ref 0–45)
BILIRUB SERPL-MCNC: 0.5 MG/DL (ref 0.2–1.3)
BUN SERPL-MCNC: 11 MG/DL (ref 7–30)
CALCIUM SERPL-MCNC: 9.7 MG/DL (ref 8.5–10.1)
CHLORIDE SERPL-SCNC: 103 MMOL/L (ref 94–109)
CHOLEST SERPL-MCNC: 172 MG/DL
CO2 SERPL-SCNC: 25 MMOL/L (ref 20–32)
CREAT SERPL-MCNC: 0.93 MG/DL (ref 0.66–1.25)
CREAT UR-MCNC: 118 MG/DL
EST. AVERAGE GLUCOSE BLD GHB EST-MCNC: 134 MG/DL
GFR SERPL CREATININE-BSD FRML MDRD: >90 ML/MIN/{1.73_M2}
GLUCOSE SERPL-MCNC: 138 MG/DL (ref 70–99)
HBA1C MFR BLD: 6.3 % (ref 0–5.6)
HDLC SERPL-MCNC: 39 MG/DL
LDLC SERPL CALC-MCNC: 89 MG/DL
MICROALBUMIN UR-MCNC: 16 MG/L
MICROALBUMIN/CREAT UR: 13.81 MG/G CR (ref 0–17)
NONHDLC SERPL-MCNC: 133 MG/DL
POTASSIUM SERPL-SCNC: 3.6 MMOL/L (ref 3.4–5.3)
PROT SERPL-MCNC: 7.8 G/DL (ref 6.8–8.8)
SODIUM SERPL-SCNC: 137 MMOL/L (ref 133–144)
TRIGL SERPL-MCNC: 219 MG/DL
TSH SERPL DL<=0.005 MIU/L-ACNC: 1.07 MU/L (ref 0.4–4)

## 2020-07-14 ASSESSMENT — ANXIETY QUESTIONNAIRES: GAD7 TOTAL SCORE: 0

## 2020-07-14 NOTE — RESULT ENCOUNTER NOTE
Labs look good - A1C improved  Continue to work on diet, low fat  Add fish oil 3 per day    The 10-year ASCVD risk score (Stephanie ROSA Jr., et al., 2013) is: 7.9%    Values used to calculate the score:      Age: 50 years      Sex: Male      Is Non- : No      Diabetic: Yes      Tobacco smoker: No      Systolic Blood Pressure: 142 mmHg      Is BP treated: Yes      HDL Cholesterol: 39 mg/dL      Total Cholesterol: 172 mg/dL    Samira [facility]  [phone]

## 2020-07-17 DIAGNOSIS — J30.2 SEASONAL ALLERGIC RHINITIS, UNSPECIFIED TRIGGER: ICD-10-CM

## 2020-07-17 RX ORDER — LORATADINE 10 MG/1
TABLET ORAL
Qty: 30 TABLET | Refills: 5 | Status: SHIPPED | OUTPATIENT
Start: 2020-07-17 | End: 2021-06-01

## 2020-07-22 DIAGNOSIS — E78.5 HYPERLIPIDEMIA LDL GOAL <100: ICD-10-CM

## 2020-07-23 ENCOUNTER — ALLIED HEALTH/NURSE VISIT (OUTPATIENT)
Dept: FAMILY MEDICINE | Facility: OTHER | Age: 50
End: 2020-07-23
Attending: NURSE PRACTITIONER
Payer: COMMERCIAL

## 2020-07-23 VITALS — SYSTOLIC BLOOD PRESSURE: 126 MMHG | HEART RATE: 76 BPM | DIASTOLIC BLOOD PRESSURE: 84 MMHG

## 2020-07-23 DIAGNOSIS — Z01.30 BP CHECK: Primary | ICD-10-CM

## 2020-07-23 PROCEDURE — 99207 ZZC NO CHARGE NURSE ONLY: CPT

## 2020-07-23 NOTE — TELEPHONE ENCOUNTER
simvastatin      Last Written Prescription Date:  4/9/20  Last Fill Quantity: 90,   # refills: 0  Last Office Visit: 7/13/20  Future Office visit:    Next 5 appointments (look out 90 days)    Jul 27, 2020  3:30 PM CDT  (Arrive by 3:15 PM)  Nurse Only with [place] [facility] NURSE  [facility] [place] ([facility] ) [address]  [facility] [address]  [phone]           Routing refill request to provider for review/approval because:

## 2020-07-24 RX ORDER — SIMVASTATIN 10 MG
TABLET ORAL
Qty: 90 TABLET | Refills: 3 | Status: SHIPPED | OUTPATIENT
Start: 2020-07-24 | End: 2021-09-17

## 2020-07-27 ENCOUNTER — TELEPHONE (OUTPATIENT)
Dept: FAMILY MEDICINE | Facility: OTHER | Age: 50
End: 2020-07-27

## 2020-07-27 DIAGNOSIS — M19.90 ARTHRITIS: Primary | ICD-10-CM

## 2020-07-27 DIAGNOSIS — M25.511 ACUTE PAIN OF RIGHT SHOULDER: ICD-10-CM

## 2020-07-27 RX ORDER — METHOCARBAMOL 750 MG/1
TABLET, FILM COATED ORAL
Qty: 21 TABLET | Refills: 0 | Status: SHIPPED | OUTPATIENT
Start: 2020-07-27 | End: 2020-07-27

## 2020-07-27 RX ORDER — METHOCARBAMOL 750 MG/1
TABLET, FILM COATED ORAL
Qty: 90 TABLET | Refills: 1 | Status: SHIPPED | OUTPATIENT
Start: 2020-07-27 | End: 2021-01-08

## 2020-08-07 DIAGNOSIS — J30.2 CHRONIC SEASONAL ALLERGIC RHINITIS: ICD-10-CM

## 2020-08-07 DIAGNOSIS — E11.9 TYPE 2 DIABETES MELLITUS WITHOUT COMPLICATION, WITHOUT LONG-TERM CURRENT USE OF INSULIN (H): ICD-10-CM

## 2020-08-07 NOTE — TELEPHONE ENCOUNTER
cetirizine (ZYRTEC) 10 MG tablet      Last Written Prescription Date:  9/16/20  Last Fill Quantity: 90,   # refills: 3  Last Office Visit: 7/13/2020  Future Office visit:              metFORMIN (GLUCOPHAGE-XR) 500 MG 24 hr tablet           Last Written Prescription Date:  6/4/20  Last Fill Quantity: 120,   # refills: 3  Last Office Visit: 7/13/2020  Future Office visit:

## 2020-08-10 RX ORDER — CETIRIZINE HYDROCHLORIDE 10 MG/1
TABLET ORAL
Qty: 90 TABLET | Refills: 0 | Status: SHIPPED | OUTPATIENT
Start: 2020-08-10 | End: 2022-04-27

## 2020-08-10 RX ORDER — METFORMIN HCL 500 MG
TABLET, EXTENDED RELEASE 24 HR ORAL
Qty: 360 TABLET | Refills: 0 | Status: SHIPPED | OUTPATIENT
Start: 2020-08-10 | End: 2020-12-15

## 2020-11-28 DIAGNOSIS — I10 BENIGN ESSENTIAL HYPERTENSION: ICD-10-CM

## 2020-11-30 RX ORDER — AMLODIPINE BESYLATE 10 MG/1
TABLET ORAL
Qty: 90 TABLET | Refills: 1 | Status: SHIPPED | OUTPATIENT
Start: 2020-11-30 | End: 2021-11-26

## 2020-11-30 NOTE — TELEPHONE ENCOUNTER
Norvasc       Last Written Prescription Date:  9/16/2020  Last Fill Quantity: 90,   # refills: 1  Last Office Visit: 7/13/2020  Future Office visit:    Next 5 appointments (look out 90 days)    Jan 08, 2021  3:30 PM  (Arrive by 3:15 PM)  SHORT with Samira Whitney CNP  [facility] ([facility] ) [address]  [facility] [address]  [phone]

## 2020-12-06 ENCOUNTER — HEALTH MAINTENANCE LETTER (OUTPATIENT)
Age: 50
End: 2020-12-06

## 2020-12-15 DIAGNOSIS — E11.9 TYPE 2 DIABETES MELLITUS WITHOUT COMPLICATION, WITHOUT LONG-TERM CURRENT USE OF INSULIN (H): ICD-10-CM

## 2020-12-15 RX ORDER — METFORMIN HCL 500 MG
TABLET, EXTENDED RELEASE 24 HR ORAL
Qty: 360 TABLET | Refills: 0 | Status: SHIPPED | OUTPATIENT
Start: 2020-12-15 | End: 2021-01-08

## 2020-12-15 NOTE — TELEPHONE ENCOUNTER
Metformin 500 mg      Last Written Prescription Date:  8/10/20  Last Fill Quantity: 360,   # refills: 0  Last Office Visit: 7/13/20  Future Office visit:    Next 5 appointments (look out 90 days)    Jan 08, 2021  3:30 PM  (Arrive by 3:15 PM)  SHORT with Samira Whitney CNP  [facility] ([facility] ) [address]  [facility] [address]  [phone]           Routing refill request to provider for review/approval because:

## 2021-01-05 DIAGNOSIS — J30.2 CHRONIC SEASONAL ALLERGIC RHINITIS: ICD-10-CM

## 2021-01-05 RX ORDER — MONTELUKAST SODIUM 10 MG/1
TABLET ORAL
Qty: 90 TABLET | Refills: 0 | Status: SHIPPED | OUTPATIENT
Start: 2021-01-05 | End: 2021-04-15

## 2021-01-06 NOTE — PROGRESS NOTES
Jason Duncan is a 50 year old who presents to clinic today for the following health issues       Diabetes Follow-up    How often are you checking your blood sugar? Not at all    What concerns do you have today about your diabetes? None     Do you have any of these symptoms? (Select all that apply)  No numbness or tingling in feet.  No redness, sores or blisters on feet.  No complaints of excessive thirst.  No reports of blurry vision.  No significant changes to weight.\    Sensation intact in feet bilaterally, no lesions, sores, ulcers noted       Hyperlipidemia Follow-Up    Are you regularly taking any medication or supplement to lower your cholesterol?   Yes- simvastatin    Are you having muscle aches or other side effects that you think could be caused by your cholesterol lowering medication?  No    Hypertension Follow-up    Do you check your blood pressure regularly outside of the clinic? No     Are you following a low salt diet? No    Are your blood pressures ever more than 140 on the top number (systolic) OR more   than 90 on the bottom number (diastolic), for example 140/90? Yes       D Deficiency - lab due      RACHELE - on C-pap, stable.  Due for supply renewal.  Length of need, lifetime.  Face to face visit today        BP Readings from Last 2 Encounters:   01/08/21 125/82   07/23/20 126/84     Hemoglobin A1C (%)   Date Value   07/13/2020 6.3 (H)   10/31/2019 6.5 (H)     LDL Cholesterol Calculated (mg/dL)   Date Value   07/13/2020 89   10/31/2019 92           How many servings of fruits and vegetables do you eat daily?  2-3    On average, how many sweetened beverages do you drink each day (Examples: soda, juice, sweet tea, etc.  Do NOT count diet or artificially sweetened beverages)?   1    How many days per week do you exercise enough to make your heart beat faster? 3 or less    How many minutes a day do you exercise enough to make your heart beat faster? 9 or less    How many days per week do you miss  taking your medication? 0        Patient Active Problem List   Diagnosis     Benign essential hypertension     Mixed hearing loss, unilateral     Encounter for monitoring statin therapy     Morbid obesity due to excess calories (H)     RACHELE (obstructive sleep apnea)     Vitamin D deficiency     Type 2 diabetes mellitus without complication, without long-term current use of insulin (H)     Gastric band slippage     Status post bariatric surgery     Hyperlipidemia LDL goal <100     Primary insomnia     Chronic gout of multiple sites     Primary osteoarthritis of left knee     Past Surgical History:   Procedure Laterality Date     GASTRIC RESTRICTIVE PROCEDURE, OPEN, REMOVE/REPLACE SUBCUTANEOUS PORT  2008     GI SURGERY  2015    removal of lap band      lap band  2008     nasal septoplasty       SEPTOPLASTY      Nasal       Social History     Tobacco Use     Smoking status: Never Smoker     Smokeless tobacco: Never Used   Substance Use Topics     Alcohol use: Yes     Comment: rarely     Family History   Problem Relation Age of Onset     Dementia Mother 72        Cause of death     Diabetes Father      Respiratory Father         COPD     Cardiovascular Father         CHF             Current Outpatient Medications   Medication Sig Dispense Refill     ALLERGY RELIEF 10 MG tablet TAKE 1 TABLET BY MOUTH DAILY 30 tablet 5     amLODIPine (NORVASC) 10 MG tablet TAKE 1 TABLET BY MOUTH DAILY 90 tablet 1     aspirin 81 MG tablet Take by mouth daily       cetirizine (ZYRTEC) 10 MG tablet TAKE 1 TABLET BY MOUTH EVERY EVENING 90 tablet 0     cyclobenzaprine (FLEXERIL) 10 MG tablet TAKE 1 TABLET BY MOUTH NIGHTLY AS NEEDED FOR MUSCLE SPASMS 90 tablet 3     lisinopril (ZESTRIL) 10 MG tablet TAKE 1 AND 1/2 TABLETS (15MG) BY MOUTH DAILY 90 tablet 1     metFORMIN (GLUCOPHAGE-XR) 500 MG 24 hr tablet TAKE 2 TABLETS BY MOUTH 2 TIMES DAILY WITH MEALS 360 tablet 1     methocarbamol (ROBAXIN) 750 MG tablet TAKE 1 TABLET BY MOUTH 3 TIMES DAILY AS  "NEEDED FOR MUSCLE SPASMS 90 tablet 1     montelukast (SINGULAIR) 10 MG tablet TAKE 1 TABLET BY MOUTH DAILY AT BEDTIME 90 tablet 0     multivitamin, therapeutic with minerals (MULTI-VITAMIN) TABS Take 1 tablet by mouth daily       order for DME c-pap mask and supplies    DX: G47.33, sleep apnea      RACHELE - uses CPAP  Stable, does well with CPAP  Is due for annual supply renewal  Length of need:  Lifetime  Face to face visit - 2/21/2020 1 Device 11     simvastatin (ZOCOR) 10 MG tablet TAKE 1 TABLET BY MOUTH DAILY AT BEDTIME 90 tablet 3     VITAMIN D, CHOLECALCIFEROL, PO Take 5,000 Units by mouth daily            No Known Allergies       Recent Labs   Lab Test 07/13/20  1435 07/03/20  1126 10/31/19  0931 02/08/19  1026 08/07/18  0940   A1C 6.3*  --  6.5* 6.8* 6.9*   LDL 89  --  92 145* 138*   HDL 39*  --  39* 42 41   TRIG 219*  --  182* 123 131   *  --   --  97* 102*   CR 0.93 0.98  --  1.08 1.06   GFRESTIMATED >90 >90  --  81 75   GFRESTBLACK >90 >90  --  >90 >90   POTASSIUM 3.6 4.3  --  4.4 3.8   TSH 1.07  --  1.10 1.08 1.42        BP Readings from Last 3 Encounters:   01/08/21 125/82   07/23/20 126/84   07/13/20 (!) 142/74    Wt Readings from Last 3 Encounters:   01/08/21 (!) 159.3 kg (351 lb 4.8 oz)   07/13/20 (!) 153.8 kg (339 lb)   02/21/20 (!) 156 kg (344 lb)                 Review of Systems   Review Of Systems  Skin: negative  Eyes: negative  Ears/Nose/Throat: negative  Respiratory: No shortness of breath, dyspnea on exertion, cough, or hemoptysis  Cardiovascular: negative  Gastrointestinal: negative  Genitourinary: negative  Musculoskeletal: positive for joint stiffness in knees bilaterally, negative gout symptoms  Neurologic: negative  Psychiatric: negative  Hematologic/Lymphatic/Immunologic: negative  Endocrine: negative        Objective    /82   Pulse 93   Temp 97.9  F (36.6  C) (Tympanic)   Ht 1.905 m (6' 3\")   Wt (!) 159.3 kg (351 lb 4.8 oz)   SpO2 96%   BMI 43.91 kg/m    Body mass " index is 43.91 kg/m .       Physical Exam   GENERAL: healthy, alert and no distress  EYES: Eyes grossly normal to inspection, PERRL and conjunctivae and sclerae normal  HENT: ear canals and TM's normal, nose and mouth without ulcers or lesions  NECK: No asymmetry, masses, or scars   RESP: lungs clear to auscultation - no rales, rhonchi or wheezes  CV: regular rate and rhythm, normal S1 S2, no S3 or S4, no murmur, click or rub, no peripheral edema and peripheral pulses strong  ABDOMEN: soft, nontender  MS: no gross musculoskeletal defects noted, no edema  NEURO: Normal strength and tone, mentation intact and speech normal  PSYCH: mentation appears normal, affect normal/bright        Assessment & Plan     Type 2 diabetes mellitus without complication, without long-term current use of insulin (H  - Hemoglobin A1c; Future  - ZZC FOOT EXAM  NO CHARGE  - Comprehensive metabolic panel (BMP + Alb, Alk Phos, ALT, AST, Total. Bili, TP); Future  - UA reflex to Microscopic; Future  - Albumin Random Urine Quantitative with Creat Ratio; Future  - TSH with free T4 reflex; Future  - metFORMIN (GLUCOPHAGE-XR) 500 MG 24 hr tablet; TAKE 2 TABLETS BY MOUTH 2 TIMES DAILY WITH MEALS    Hyperlipidemia LDL goal <100  - Lipid Profile; Future    Vitamin D deficiency  - Vitamin D level  - D3 5000 U daily    Chronic gout of multiple sites, unspecified cause  - Stable    Benign essential hypertension  - TSH with free T4 reflex; Future    Arthritis  - methocarbamol (ROBAXIN) 750 MG tablet; TAKE 1 TABLET BY MOUTH 3 TIMES DAILY AS NEEDED FOR MUSCLE SPASMS    RACHELE (obstructive sleep apnea)  - Miscellaneous Order for DME - ONLY FOR DME      OTC magnesium 200 mg daily      Return in about 6 months (around 7/8/2021) for Chronic disease management, am fasting.      Samira Whitney CNP  [facility]

## 2021-01-08 ENCOUNTER — OFFICE VISIT (OUTPATIENT)
Dept: FAMILY MEDICINE | Facility: OTHER | Age: 51
End: 2021-01-08
Attending: NURSE PRACTITIONER
Payer: COMMERCIAL

## 2021-01-08 VITALS
WEIGHT: 315 LBS | HEIGHT: 75 IN | SYSTOLIC BLOOD PRESSURE: 125 MMHG | DIASTOLIC BLOOD PRESSURE: 82 MMHG | TEMPERATURE: 97.9 F | OXYGEN SATURATION: 96 % | BODY MASS INDEX: 39.17 KG/M2 | HEART RATE: 93 BPM

## 2021-01-08 DIAGNOSIS — E78.5 HYPERLIPIDEMIA LDL GOAL <100: ICD-10-CM

## 2021-01-08 DIAGNOSIS — E55.9 VITAMIN D DEFICIENCY: ICD-10-CM

## 2021-01-08 DIAGNOSIS — E11.9 TYPE 2 DIABETES MELLITUS WITHOUT COMPLICATION, WITHOUT LONG-TERM CURRENT USE OF INSULIN (H): Primary | ICD-10-CM

## 2021-01-08 DIAGNOSIS — G47.33 OSA (OBSTRUCTIVE SLEEP APNEA): ICD-10-CM

## 2021-01-08 DIAGNOSIS — M1A.09X0 CHRONIC GOUT OF MULTIPLE SITES, UNSPECIFIED CAUSE: ICD-10-CM

## 2021-01-08 DIAGNOSIS — I10 BENIGN ESSENTIAL HYPERTENSION: ICD-10-CM

## 2021-01-08 DIAGNOSIS — M19.90 ARTHRITIS: ICD-10-CM

## 2021-01-08 PROCEDURE — 99214 OFFICE O/P EST MOD 30 MIN: CPT | Performed by: NURSE PRACTITIONER

## 2021-01-08 RX ORDER — METHOCARBAMOL 750 MG/1
TABLET, FILM COATED ORAL
Qty: 90 TABLET | Refills: 1 | Status: SHIPPED | OUTPATIENT
Start: 2021-01-08 | End: 2022-05-06

## 2021-01-08 RX ORDER — METFORMIN HCL 500 MG
TABLET, EXTENDED RELEASE 24 HR ORAL
Qty: 360 TABLET | Refills: 1 | Status: SHIPPED | OUTPATIENT
Start: 2021-01-08 | End: 2022-01-10

## 2021-01-08 ASSESSMENT — MIFFLIN-ST. JEOR: SCORE: 2549.12

## 2021-01-08 ASSESSMENT — PAIN SCALES - GENERAL: PAINLEVEL: NO PAIN (0)

## 2021-01-08 NOTE — PATIENT INSTRUCTIONS
Assessment & Plan     Type 2 diabetes mellitus without complication, without long-term current use of insulin (H  - Hemoglobin A1c; Future  - ZZC FOOT EXAM  NO CHARGE  - Comprehensive metabolic panel (BMP + Alb, Alk Phos, ALT, AST, Total. Bili, TP); Future  - UA reflex to Microscopic; Future  - Albumin Random Urine Quantitative with Creat Ratio; Future  - TSH with free T4 reflex; Future  - metFORMIN (GLUCOPHAGE-XR) 500 MG 24 hr tablet; TAKE 2 TABLETS BY MOUTH 2 TIMES DAILY WITH MEALS    Hyperlipidemia LDL goal <100  - Lipid Profile; Future    Vitamin D deficiency  - Vitamin D level  - D3 5000 U daily    Chronic gout of multiple sites, unspecified cause  - Stable    Benign essential hypertension  - TSH with free T4 reflex; Future    Arthritis  - methocarbamol (ROBAXIN) 750 MG tablet; TAKE 1 TABLET BY MOUTH 3 TIMES DAILY AS NEEDED FOR MUSCLE SPASMS    RACHELE (obstructive sleep apnea)  - Miscellaneous Order for DME - ONLY FOR DME      OTC magnesium 200 mg daily      Return in about 6 months (around 7/8/2021) for Chronic disease management, am fasting.      Samira Whitney CNP  [facility]

## 2021-01-08 NOTE — NURSING NOTE
"Chief Complaint   Patient presents with     Diabetes     Lipids     Hypertension       Initial /84   Pulse 93   Temp 97.9  F (36.6  C) (Tympanic)   Ht 1.905 m (6' 3\")   Wt (!) 159.3 kg (351 lb 4.8 oz)   SpO2 96%   BMI 43.91 kg/m   Estimated body mass index is 43.91 kg/m  as calculated from the following:    Height as of this encounter: 1.905 m (6' 3\").    Weight as of this encounter: 159.3 kg (351 lb 4.8 oz).  Medication Reconciliation: complete  Soniya Danielle LPN  "

## 2021-01-13 ENCOUNTER — MEDICAL CORRESPONDENCE (OUTPATIENT)
Dept: HEALTH INFORMATION MANAGEMENT | Facility: CLINIC | Age: 51
End: 2021-01-13

## 2021-01-14 DIAGNOSIS — E11.9 TYPE 2 DIABETES MELLITUS WITHOUT COMPLICATION, WITHOUT LONG-TERM CURRENT USE OF INSULIN (H): ICD-10-CM

## 2021-01-14 DIAGNOSIS — E55.9 VITAMIN D DEFICIENCY: ICD-10-CM

## 2021-01-14 DIAGNOSIS — I10 BENIGN ESSENTIAL HYPERTENSION: ICD-10-CM

## 2021-01-14 DIAGNOSIS — E78.5 HYPERLIPIDEMIA LDL GOAL <100: ICD-10-CM

## 2021-01-14 LAB
ALBUMIN SERPL-MCNC: 3.8 G/DL (ref 3.4–5)
ALP SERPL-CCNC: 77 U/L (ref 40–150)
ALT SERPL W P-5'-P-CCNC: 134 U/L (ref 0–70)
ANION GAP SERPL CALCULATED.3IONS-SCNC: 5 MMOL/L (ref 3–14)
AST SERPL W P-5'-P-CCNC: 76 U/L (ref 0–45)
BILIRUB SERPL-MCNC: 0.6 MG/DL (ref 0.2–1.3)
BUN SERPL-MCNC: 13 MG/DL (ref 7–30)
CALCIUM SERPL-MCNC: 9.3 MG/DL (ref 8.5–10.1)
CHLORIDE SERPL-SCNC: 107 MMOL/L (ref 94–109)
CHOLEST SERPL-MCNC: 161 MG/DL
CO2 SERPL-SCNC: 26 MMOL/L (ref 20–32)
CREAT SERPL-MCNC: 0.92 MG/DL (ref 0.66–1.25)
DEPRECATED CALCIDIOL+CALCIFEROL SERPL-MC: 28 UG/L (ref 20–75)
EST. AVERAGE GLUCOSE BLD GHB EST-MCNC: 186 MG/DL
GFR SERPL CREATININE-BSD FRML MDRD: >90 ML/MIN/{1.73_M2}
GLUCOSE SERPL-MCNC: 160 MG/DL (ref 70–99)
HBA1C MFR BLD: 8.1 % (ref 0–5.6)
HDLC SERPL-MCNC: 40 MG/DL
LDLC SERPL CALC-MCNC: 91 MG/DL
NONHDLC SERPL-MCNC: 121 MG/DL
POTASSIUM SERPL-SCNC: 4 MMOL/L (ref 3.4–5.3)
PROT SERPL-MCNC: 7.4 G/DL (ref 6.8–8.8)
SODIUM SERPL-SCNC: 138 MMOL/L (ref 133–144)
TRIGL SERPL-MCNC: 148 MG/DL
TSH SERPL DL<=0.005 MIU/L-ACNC: 1.52 MU/L (ref 0.4–4)

## 2021-01-14 PROCEDURE — 80061 LIPID PANEL: CPT | Performed by: NURSE PRACTITIONER

## 2021-01-14 PROCEDURE — 84443 ASSAY THYROID STIM HORMONE: CPT | Performed by: NURSE PRACTITIONER

## 2021-01-14 PROCEDURE — 80053 COMPREHEN METABOLIC PANEL: CPT | Performed by: NURSE PRACTITIONER

## 2021-01-14 PROCEDURE — 82306 VITAMIN D 25 HYDROXY: CPT | Performed by: NURSE PRACTITIONER

## 2021-01-14 PROCEDURE — 83036 HEMOGLOBIN GLYCOSYLATED A1C: CPT | Performed by: NURSE PRACTITIONER

## 2021-01-14 PROCEDURE — 36415 COLL VENOUS BLD VENIPUNCTURE: CPT | Performed by: NURSE PRACTITIONER

## 2021-01-15 ENCOUNTER — TRANSFERRED RECORDS (OUTPATIENT)
Dept: HEALTH INFORMATION MANAGEMENT | Facility: CLINIC | Age: 51
End: 2021-01-15

## 2021-01-15 DIAGNOSIS — E11.9 TYPE 2 DIABETES MELLITUS WITHOUT COMPLICATION, WITHOUT LONG-TERM CURRENT USE OF INSULIN (H): ICD-10-CM

## 2021-01-15 LAB
ALBUMIN UR-MCNC: 10 MG/DL
APPEARANCE UR: CLEAR
BACTERIA #/AREA URNS HPF: ABNORMAL /HPF
BILIRUB UR QL STRIP: NEGATIVE
COLOR UR AUTO: YELLOW
GLUCOSE UR STRIP-MCNC: NEGATIVE MG/DL
HGB UR QL STRIP: NEGATIVE
KETONES UR STRIP-MCNC: NEGATIVE MG/DL
LEUKOCYTE ESTERASE UR QL STRIP: NEGATIVE
MUCOUS THREADS #/AREA URNS LPF: PRESENT /LPF
NITRATE UR QL: NEGATIVE
PH UR STRIP: 5.5 PH (ref 4.7–8)
RBC #/AREA URNS AUTO: <1 /HPF (ref 0–2)
SOURCE: ABNORMAL
SP GR UR STRIP: 1.02 (ref 1–1.03)
SQUAMOUS #/AREA URNS AUTO: 1 /HPF (ref 0–1)
UROBILINOGEN UR STRIP-MCNC: NORMAL MG/DL (ref 0–2)
WBC #/AREA URNS AUTO: 2 /HPF (ref 0–5)

## 2021-01-15 PROCEDURE — 81001 URINALYSIS AUTO W/SCOPE: CPT | Performed by: NURSE PRACTITIONER

## 2021-01-15 NOTE — RESULT ENCOUNTER NOTE
A1C is up to 8.1 from 6.3 - six months ago  He is on Metformin  I added Januvia 100 mg daily  He needs to watch his sugars, monitor his diet closely, and increase exercise.  Offer diabetic education    Lipids look good    LFT's up a bit, avoid tylenol, low fat diet    D level low end of normal - D3 5000 U daily    The 10-year ASCVD risk score (Stephanie ROSA Jr., et al., 2013) is: 5.6%    Values used to calculate the score:      Age: 50 years      Sex: Male      Is Non- : No      Diabetic: Yes      Tobacco smoker: No      Systolic Blood Pressure: 125 mmHg      Is BP treated: Yes      HDL Cholesterol: 40 mg/dL      Total Cholesterol: 161 mg/dL      Samira TOROUpstate [facility]  [phone]

## 2021-03-03 ENCOUNTER — TRANSFERRED RECORDS (OUTPATIENT)
Dept: HEALTH INFORMATION MANAGEMENT | Facility: CLINIC | Age: 51
End: 2021-03-03

## 2021-03-16 NOTE — PROGRESS NOTES
[facility]  [address]  [facility] [address]  Phone: [phone]  Primary Provider: Samira Natarajan  Pre-op Performing Provider: SAMIRA NATARAJAN    :[id number]}  PREOPERATIVE EVALUATION:  Today's date: 3/22/2021      Jason Duncan is a 51 year old male who presents for a preoperative evaluation.      Surgical Information:  Surgery/Procedure: LEFT KNEE ARTHROSCOPY: PARTIAL MEDIAL MENISECTOMY, PARTIAL LATERAL MENISECTOMY  Surgery Location: [facility] [place]  Surgeon: Dr Au  Surgery Date: 3/30/21  Time of Surgery: tbd  Where patient plans to recover: At home with family  Fax number for surgical facility: Note does not need to be faxed, will be available electronically in Epic.      Type of Anesthesia Anticipated: to be determined        Subjective     HPI related to upcoming procedure:  Left knee pain        Examination: MR KNEE LEFT W/O CONTRAST  2/28/2020 8:01 AM     Clinical History: Male, age 50 years,  left knee pain, instability,  osteoarthritis.  Knee wants to give out; Left knee pain, unspecified  chronicity; Primary osteoarthritis of left knee; Knee instability,  left ; one month history of pain following a fall. No previous  surgery.     Comparison: Left knee x-ray 2/7/2020     Technique:  Sagittal proton density fat saturation, T2; axial proton  density without with fat saturation; coronal proton-density fat  saturation T1.     Findings:     Medial Compartment:     Medial meniscus:  Free edge tear involving the body and posterior  horn of the medial meniscus.       Weightbearing articular cartilage: Mild degenerative changes.       Medial collateral ligament: Intact     ACL:  Intact     PCL:  Intact.     Extensor Mechanism:  Intact.     Lateral Compartment:     Lateral meniscus:  Discoid variant with free edge fraying.       Weightbearing articular cartilage:  Moderate degenerative change.        Lateral collateral ligament complex: Intact.       The proximal tibiofibular articulation is  congruent.     Femoral patellar joint:   Alignment:  Lateral subluxation of the patella.      Articular cartilage: Partial and full-thickness fraying involving the  retropatellar and trochlear articular cartilage, most evident at the  patellar apex and lateral facet.       Joint space: Moderate size joint effusion.     Musculature and retinacula: Normal in bulk and contour. Retinacula  intact.     Neurovascular structures:  Subchondral reactive changes of the  patella.      Osseous Structures:  Small focus of marrow edema along the posterior  margin of the lateral tibial plateau.     Other:  Small leaking Baker's cyst.                                                                     IMPRESSION:    Bony contusion along the posterior margin of the lateral tibial  plateau.     Degenerative tear involving the body and posterior horn of the medial  meniscus.     Discoid variant of the lateral meniscus with free edge fraying.     Partial and full-thickness articular cartilage defects of the femoral  patellar joint, most evident at the patellar apex.     Mild to moderate degenerative changes involving the weightbearing to  cartilage of both the medial lateral compartment with moderate size  joint effusion.     TRAVON NELSON MD      Preop Questions 3/22/2021   1. Have you ever had a heart attack or stroke? No   2. Have you ever had surgery on your heart or blood vessels, such as a stent placement, a coronary artery bypass, or surgery on an artery in your head, neck, heart, or legs? No   3. Do you have chest pain with activity? No   4. Do you have a history of  heart failure? No   5. Do you currently have a cold, bronchitis or symptoms of other infection? No   6. Do you have a cough, shortness of breath, or wheezing? No   7. Do you or anyone in your family have previous history of blood clots? No   8. Do you or does anyone in your family have a serious bleeding problem such as prolonged bleeding following surgeries or  cuts? No   9. Have you ever had problems with anemia or been told to take iron pills? No   10. Have you had any abnormal blood loss such as black, tarry or bloody stools? No   11. Have you ever had a blood transfusion? No   12. Are you willing to have a blood transfusion if it is medically needed before, during, or after your surgery? Yes   13. Have you or any of your relatives ever had problems with anesthesia? No   14. Do you have sleep apnea, excessive snoring or daytime drowsiness? YES - RACHELE   14a. Do you have a CPAP machine? Yes   15. Do you have any artifical heart valves or other implanted medical devices like a pacemaker, defibrillator, or continuous glucose monitor? No   16. Do you have artificial joints? No   17. Are you allergic to latex? No         Status of Chronic Conditions:  See problem list for active medical problems.  Problems all longstanding and stable, except as noted/documented.  See ROS for pertinent symptoms related to these conditions.        Review of Systems  CONSTITUTIONAL: NEGATIVE for fever, chills, change in weight  INTEGUMENTARY/SKIN: NEGATIVE for worrisome rashes, moles or lesions  EYES: NEGATIVE for vision changes or irritation  ENT/MOUTH: NEGATIVE for ear, mouth and throat problems  RESP: NEGATIVE for significant cough or SOB  CV: NEGATIVE for chest pain, palpitations or peripheral edema  GI: NEGATIVE for nausea, abdominal pain, heartburn, or change in bowel habits  : NEGATIVE for frequency, dysuria, or hematuria  NEURO: NEGATIVE for weakness, dizziness or paresthesias  ENDOCRINE: NEGATIVE for temperature intolerance, skin/hair changes  HEME: NEGATIVE for bleeding problems  PSYCHIATRIC: NEGATIVE for changes in mood or affect      Patient Active Problem List    Diagnosis Date Noted     Primary osteoarthritis of left knee 02/21/2020     Priority: Medium     Hyperlipidemia LDL goal <100 09/16/2019     Priority: Medium     Primary insomnia 09/16/2019     Priority: Medium     Chronic  gout of multiple sites 09/16/2019     Priority: Medium     Type 2 diabetes mellitus without complication, without long-term current use of insulin (H) 07/10/2018     Priority: Medium     Vitamin D deficiency 04/17/2018     Priority: Medium     RACHELE (obstructive sleep apnea) 05/30/2017     Priority: Medium     Morbid obesity due to excess calories (H) 09/07/2016     Priority: Medium     Encounter for monitoring statin therapy 06/28/2016     Priority: Medium     Advance Care Planning 6/28/2016: ACP Review of Chart / Resources Provided:  Reviewed chart for advance care plan.  Jason Duncan has no plan or code status on file. Discussed available resources and provided with information. Confirmed code status reflects current choices pending further ACP discussions.  Confirmed/documented legally designated decision makers.  Added by Phyllis Olvera             Gastric band slippage 09/01/2015     Priority: Medium     Mixed hearing loss, unilateral 11/20/2013     Priority: Medium     Status post bariatric surgery 09/25/2008     Priority: Medium     Benign essential hypertension 06/09/2008     Priority: Medium      Past Medical History:   Diagnosis Date     Allergic rhinitis      Benign essential hypertension 6/9/2008     Problem list name updated by automated process. Provider to review     Chronic gout of multiple sites 9/16/2019     Cramp of limb 4/17/2018     Hyperlipidemia LDL goal <100 9/16/2019     RACHELE (obstructive sleep apnea)      Primary insomnia 9/16/2019     Primary osteoarthritis of left knee 2/21/2020     Type 2 diabetes mellitus without complication, without long-term current use of insulin (H) 7/10/2018     Vitamin D deficiency 4/17/2018       Past Surgical History:   Procedure Laterality Date     GASTRIC RESTRICTIVE PROCEDURE, OPEN, REMOVE/REPLACE SUBCUTANEOUS PORT  2008     GI SURGERY  2015    removal of lap band      lap band  2008     nasal septoplasty       SEPTOPLASTY      Nasal       Current Outpatient  Medications   Medication Sig Dispense Refill     ALLERGY RELIEF 10 MG tablet TAKE 1 TABLET BY MOUTH DAILY 30 tablet 5     amLODIPine (NORVASC) 10 MG tablet TAKE 1 TABLET BY MOUTH DAILY 90 tablet 1     aspirin 81 MG tablet Take by mouth daily       cetirizine (ZYRTEC) 10 MG tablet TAKE 1 TABLET BY MOUTH EVERY EVENING 90 tablet 0     cyclobenzaprine (FLEXERIL) 10 MG tablet TAKE 1 TABLET BY MOUTH NIGHTLY AS NEEDED FOR MUSCLE SPASMS 90 tablet 3     lisinopril (ZESTRIL) 10 MG tablet TAKE 1 AND 1/2 TABLETS (15MG) BY MOUTH DAILY 90 tablet 1     metFORMIN (GLUCOPHAGE-XR) 500 MG 24 hr tablet TAKE 2 TABLETS BY MOUTH 2 TIMES DAILY WITH MEALS 360 tablet 1     methocarbamol (ROBAXIN) 750 MG tablet TAKE 1 TABLET BY MOUTH 3 TIMES DAILY AS NEEDED FOR MUSCLE SPASMS 90 tablet 1     montelukast (SINGULAIR) 10 MG tablet TAKE 1 TABLET BY MOUTH DAILY AT BEDTIME 90 tablet 0     Multiple Vitamins-Minerals (ZINC PO)        multivitamin, therapeutic with minerals (MULTI-VITAMIN) TABS Take 1 tablet by mouth daily       order for DME c-pap mask and supplies    DX: G47.33, sleep apnea      RACHELE - uses CPAP  Stable, does well with CPAP  Is due for annual supply renewal  Length of need:  Lifetime  Face to face visit - 2/21/2020 1 Device 11     simvastatin (ZOCOR) 10 MG tablet TAKE 1 TABLET BY MOUTH DAILY AT BEDTIME 90 tablet 3     sitagliptin (JANUVIA) 100 MG tablet Take 1 tablet (100 mg) by mouth daily 90 tablet 1     VITAMIN D, CHOLECALCIFEROL, PO Take 5,000 Units by mouth daily          No Known Allergies     Social History     Tobacco Use     Smoking status: Never Smoker     Smokeless tobacco: Never Used   Substance Use Topics     Alcohol use: Yes     Comment: rarely       Family History   Problem Relation Age of Onset     Dementia Mother 72        Cause of death     Diabetes Father      Respiratory Father         COPD     Cardiovascular Father         CHF     History   Drug Use No         Objective     Pulse 93   Temp 97.6  F (36.4  C)  "(Tympanic)   Resp 18   Ht 1.905 m (6' 3\")   Wt (!) 158.8 kg (350 lb)   SpO2 96%   BMI 43.75 kg/m        Physical Exam    GENERAL APPEARANCE: healthy, alert and no distress     EYES: EOMI,  PERRL     HENT: ear canals and TM's normal and nose and mouth without ulcers or lesions     NECK: no adenopathy, no asymmetry, masses, or scars and thyroid normal to palpation     RESP: lungs clear to auscultation - no rales, rhonchi or wheezes     CV: regular rates and rhythm, normal S1 S2, no S3 or S4 and no murmur, click or rub     ABDOMEN:  soft, nontender, no HSM or masses and bowel sounds normal     MS: Bilateral knee pain     SKIN: no suspicious lesions or rashes     NEURO: Normal strength and tone, sensory exam grossly normal, mentation intact and speech normal     PSYCH: mentation appears normal. and affect normal/bright     LYMPHATICS: No cervical adenopathy        Recent Labs   Lab Test 01/14/21  0910 07/13/20  1435    137   POTASSIUM 4.0 3.6   CR 0.92 0.93   A1C 8.1* 6.3*          Diagnostics:  Recent Results (from the past 24 hour(s))   CBC with platelets and differential    Collection Time: 03/22/21  2:10 PM   Result Value Ref Range    WBC 8.2 4.0 - 11.0 10e9/L    RBC Count 5.17 4.4 - 5.9 10e12/L    Hemoglobin 15.6 13.3 - 17.7 g/dL    Hematocrit 44.5 40.0 - 53.0 %    MCV 86 78 - 100 fl    MCH 30.2 26.5 - 33.0 pg    MCHC 35.1 31.5 - 36.5 g/dL    RDW 13.4 10.0 - 15.0 %    Platelet Count 311 150 - 450 10e9/L    % Neutrophils 71.1 %    % Lymphocytes 16.8 %    % Monocytes 8.8 %    % Eosinophils 2.7 %    % Basophils 0.6 %    Absolute Neutrophil 5.8 1.6 - 8.3 10e9/L    Absolute Lymphocytes 1.4 0.8 - 5.3 10e9/L    Absolute Monocytes 0.7 0.0 - 1.3 10e9/L    Absolute Eosinophils 0.2 0.0 - 0.7 10e9/L    Absolute Basophils 0.1 0.0 - 0.2 10e9/L    Diff Method Automated Method    Comprehensive metabolic panel (BMP + Alb, Alk Phos, ALT, AST, Total. Bili, TP)    Collection Time: 03/22/21  2:10 PM   Result Value Ref Range "    Sodium 138 133 - 144 mmol/L    Potassium 3.8 3.4 - 5.3 mmol/L    Chloride 105 94 - 109 mmol/L    Carbon Dioxide 25 20 - 32 mmol/L    Anion Gap 8 3 - 14 mmol/L    Glucose 103 (H) 70 - 99 mg/dL    Urea Nitrogen 16 7 - 30 mg/dL    Creatinine 1.04 0.66 - 1.25 mg/dL    GFR Estimate 84 >60 mL/min/[1.73_m2]    GFR Estimate If Black >90 >60 mL/min/[1.73_m2]    Calcium 9.4 8.5 - 10.1 mg/dL    Bilirubin Total 0.5 0.2 - 1.3 mg/dL    Albumin 3.9 3.4 - 5.0 g/dL    Protein Total 7.6 6.8 - 8.8 g/dL    Alkaline Phosphatase 70 40 - 150 U/L     (H) 0 - 70 U/L    AST 99 (H) 0 - 45 U/L   Hemoglobin A1c    Collection Time: 03/22/21  2:10 PM   Result Value Ref Range    Hemoglobin A1C 6.5 (H) 0 - 5.6 %   Estimated Average Glucose    Collection Time: 03/22/21  2:10 PM   Result Value Ref Range    Estimated Average Glucose 140 mg/dL          EKG: appears normal, NSR       Revised Cardiac Risk Index (RCRI):  The patient has the following serious cardiovascular risks for perioperative complications:   - No serious cardiac risks = 0 points       RCRI Interpretation: 0 points: Class I (very low risk - 0.4% complication rate)        Assessment & Plan     The proposed surgical procedure is considered LOW risk.      Preop general physical exam  - EKG 12-lead complete w/read - (Clinic Performed)  - CBC with platelets and differential  - Comprehensive metabolic panel (BMP + Alb, Alk Phos, ALT, AST, Total. Bili, TP)  - Hemoglobin A1c    Acute meniscal tear of left knee, sequela  - As above      Type 2 diabetes mellitus without complication, without long-term current use of insulin (H)  - Hemoglobin A1c         Risks and Recommendations:  The patient has the following additional risks and recommendations for perioperative complications:   - No identified additional risk factors other than previously addressed        RECOMMENDATION:  APPROVAL GIVEN to proceed with proposed procedure, without further diagnostic evaluation.    [id number]}     Signed  Electronically by: Samira Whitney CNP  Copy of this evaluation report is provided to requesting physician.

## 2021-03-16 NOTE — H&P (VIEW-ONLY)
[facility]  [address]  [facility] [address]  Phone: [phone]  Primary Provider: Samira Natarajan  Pre-op Performing Provider: SAMIRA NATARAJAN    :[id number]}  PREOPERATIVE EVALUATION:  Today's date: 3/22/2021      Jason Duncan is a 51 year old male who presents for a preoperative evaluation.      Surgical Information:  Surgery/Procedure: LEFT KNEE ARTHROSCOPY: PARTIAL MEDIAL MENISECTOMY, PARTIAL LATERAL MENISECTOMY  Surgery Location: [facility]  Surgeon: Dr Au  Surgery Date: 3/30/21  Time of Surgery: tbd  Where patient plans to recover: At home with family  Fax number for surgical facility: Note does not need to be faxed, will be available electronically in Epic.      Type of Anesthesia Anticipated: to be determined        Subjective     HPI related to upcoming procedure:  Left knee pain        Examination: MR KNEE LEFT W/O CONTRAST  2/28/2020 8:01 AM     Clinical History: Male, age 50 years,  left knee pain, instability,  osteoarthritis.  Knee wants to give out; Left knee pain, unspecified  chronicity; Primary osteoarthritis of left knee; Knee instability,  left ; one month history of pain following a fall. No previous  surgery.     Comparison: Left knee x-ray 2/7/2020     Technique:  Sagittal proton density fat saturation, T2; axial proton  density without with fat saturation; coronal proton-density fat  saturation T1.     Findings:     Medial Compartment:     Medial meniscus:  Free edge tear involving the body and posterior  horn of the medial meniscus.       Weightbearing articular cartilage: Mild degenerative changes.       Medial collateral ligament: Intact     ACL:  Intact     PCL:  Intact.     Extensor Mechanism:  Intact.     Lateral Compartment:     Lateral meniscus:  Discoid variant with free edge fraying.       Weightbearing articular cartilage:  Moderate degenerative change.        Lateral collateral ligament complex: Intact.       The proximal tibiofibular articulation is  congruent.     Femoral patellar joint:   Alignment:  Lateral subluxation of the patella.      Articular cartilage: Partial and full-thickness fraying involving the  retropatellar and trochlear articular cartilage, most evident at the  patellar apex and lateral facet.       Joint space: Moderate size joint effusion.     Musculature and retinacula: Normal in bulk and contour. Retinacula  intact.     Neurovascular structures:  Subchondral reactive changes of the  patella.      Osseous Structures:  Small focus of marrow edema along the posterior  margin of the lateral tibial plateau.     Other:  Small leaking Baker's cyst.                                                                     IMPRESSION:    Bony contusion along the posterior margin of the lateral tibial  plateau.     Degenerative tear involving the body and posterior horn of the medial  meniscus.     Discoid variant of the lateral meniscus with free edge fraying.     Partial and full-thickness articular cartilage defects of the femoral  patellar joint, most evident at the patellar apex.     Mild to moderate degenerative changes involving the weightbearing to  cartilage of both the medial lateral compartment with moderate size  joint effusion.     TRAVON NELSON MD      Preop Questions 3/22/2021   1. Have you ever had a heart attack or stroke? No   2. Have you ever had surgery on your heart or blood vessels, such as a stent placement, a coronary artery bypass, or surgery on an artery in your head, neck, heart, or legs? No   3. Do you have chest pain with activity? No   4. Do you have a history of  heart failure? No   5. Do you currently have a cold, bronchitis or symptoms of other infection? No   6. Do you have a cough, shortness of breath, or wheezing? No   7. Do you or anyone in your family have previous history of blood clots? No   8. Do you or does anyone in your family have a serious bleeding problem such as prolonged bleeding following surgeries or  cuts? No   9. Have you ever had problems with anemia or been told to take iron pills? No   10. Have you had any abnormal blood loss such as black, tarry or bloody stools? No   11. Have you ever had a blood transfusion? No   12. Are you willing to have a blood transfusion if it is medically needed before, during, or after your surgery? Yes   13. Have you or any of your relatives ever had problems with anesthesia? No   14. Do you have sleep apnea, excessive snoring or daytime drowsiness? YES - RACHELE   14a. Do you have a CPAP machine? Yes   15. Do you have any artifical heart valves or other implanted medical devices like a pacemaker, defibrillator, or continuous glucose monitor? No   16. Do you have artificial joints? No   17. Are you allergic to latex? No         Status of Chronic Conditions:  See problem list for active medical problems.  Problems all longstanding and stable, except as noted/documented.  See ROS for pertinent symptoms related to these conditions.        Review of Systems  CONSTITUTIONAL: NEGATIVE for fever, chills, change in weight  INTEGUMENTARY/SKIN: NEGATIVE for worrisome rashes, moles or lesions  EYES: NEGATIVE for vision changes or irritation  ENT/MOUTH: NEGATIVE for ear, mouth and throat problems  RESP: NEGATIVE for significant cough or SOB  CV: NEGATIVE for chest pain, palpitations or peripheral edema  GI: NEGATIVE for nausea, abdominal pain, heartburn, or change in bowel habits  : NEGATIVE for frequency, dysuria, or hematuria  NEURO: NEGATIVE for weakness, dizziness or paresthesias  ENDOCRINE: NEGATIVE for temperature intolerance, skin/hair changes  HEME: NEGATIVE for bleeding problems  PSYCHIATRIC: NEGATIVE for changes in mood or affect      Patient Active Problem List    Diagnosis Date Noted     Primary osteoarthritis of left knee 02/21/2020     Priority: Medium     Hyperlipidemia LDL goal <100 09/16/2019     Priority: Medium     Primary insomnia 09/16/2019     Priority: Medium     Chronic  gout of multiple sites 09/16/2019     Priority: Medium     Type 2 diabetes mellitus without complication, without long-term current use of insulin (H) 07/10/2018     Priority: Medium     Vitamin D deficiency 04/17/2018     Priority: Medium     RACHELE (obstructive sleep apnea) 05/30/2017     Priority: Medium     Morbid obesity due to excess calories (H) 09/07/2016     Priority: Medium     Encounter for monitoring statin therapy 06/28/2016     Priority: Medium     Advance Care Planning 6/28/2016: ACP Review of Chart / Resources Provided:  Reviewed chart for advance care plan.  Jason Duncan has no plan or code status on file. Discussed available resources and provided with information. Confirmed code status reflects current choices pending further ACP discussions.  Confirmed/documented legally designated decision makers.  Added by Phyllis Olvera             Gastric band slippage 09/01/2015     Priority: Medium     Mixed hearing loss, unilateral 11/20/2013     Priority: Medium     Status post bariatric surgery 09/25/2008     Priority: Medium     Benign essential hypertension 06/09/2008     Priority: Medium      Past Medical History:   Diagnosis Date     Allergic rhinitis      Benign essential hypertension 6/9/2008     Problem list name updated by automated process. Provider to review     Chronic gout of multiple sites 9/16/2019     Cramp of limb 4/17/2018     Hyperlipidemia LDL goal <100 9/16/2019     RACHELE (obstructive sleep apnea)      Primary insomnia 9/16/2019     Primary osteoarthritis of left knee 2/21/2020     Type 2 diabetes mellitus without complication, without long-term current use of insulin (H) 7/10/2018     Vitamin D deficiency 4/17/2018       Past Surgical History:   Procedure Laterality Date     GASTRIC RESTRICTIVE PROCEDURE, OPEN, REMOVE/REPLACE SUBCUTANEOUS PORT  2008     GI SURGERY  2015    removal of lap band      lap band  2008     nasal septoplasty       SEPTOPLASTY      Nasal       Current Outpatient  Medications   Medication Sig Dispense Refill     ALLERGY RELIEF 10 MG tablet TAKE 1 TABLET BY MOUTH DAILY 30 tablet 5     amLODIPine (NORVASC) 10 MG tablet TAKE 1 TABLET BY MOUTH DAILY 90 tablet 1     aspirin 81 MG tablet Take by mouth daily       cetirizine (ZYRTEC) 10 MG tablet TAKE 1 TABLET BY MOUTH EVERY EVENING 90 tablet 0     cyclobenzaprine (FLEXERIL) 10 MG tablet TAKE 1 TABLET BY MOUTH NIGHTLY AS NEEDED FOR MUSCLE SPASMS 90 tablet 3     lisinopril (ZESTRIL) 10 MG tablet TAKE 1 AND 1/2 TABLETS (15MG) BY MOUTH DAILY 90 tablet 1     metFORMIN (GLUCOPHAGE-XR) 500 MG 24 hr tablet TAKE 2 TABLETS BY MOUTH 2 TIMES DAILY WITH MEALS 360 tablet 1     methocarbamol (ROBAXIN) 750 MG tablet TAKE 1 TABLET BY MOUTH 3 TIMES DAILY AS NEEDED FOR MUSCLE SPASMS 90 tablet 1     montelukast (SINGULAIR) 10 MG tablet TAKE 1 TABLET BY MOUTH DAILY AT BEDTIME 90 tablet 0     Multiple Vitamins-Minerals (ZINC PO)        multivitamin, therapeutic with minerals (MULTI-VITAMIN) TABS Take 1 tablet by mouth daily       order for DME c-pap mask and supplies    DX: G47.33, sleep apnea      RACHELE - uses CPAP  Stable, does well with CPAP  Is due for annual supply renewal  Length of need:  Lifetime  Face to face visit - 2/21/2020 1 Device 11     simvastatin (ZOCOR) 10 MG tablet TAKE 1 TABLET BY MOUTH DAILY AT BEDTIME 90 tablet 3     sitagliptin (JANUVIA) 100 MG tablet Take 1 tablet (100 mg) by mouth daily 90 tablet 1     VITAMIN D, CHOLECALCIFEROL, PO Take 5,000 Units by mouth daily          No Known Allergies     Social History     Tobacco Use     Smoking status: Never Smoker     Smokeless tobacco: Never Used   Substance Use Topics     Alcohol use: Yes     Comment: rarely       Family History   Problem Relation Age of Onset     Dementia Mother 72        Cause of death     Diabetes Father      Respiratory Father         COPD     Cardiovascular Father         CHF     History   Drug Use No         Objective     Pulse 93   Temp 97.6  F (36.4  C)  "(Tympanic)   Resp 18   Ht 1.905 m (6' 3\")   Wt (!) 158.8 kg (350 lb)   SpO2 96%   BMI 43.75 kg/m        Physical Exam    GENERAL APPEARANCE: healthy, alert and no distress     EYES: EOMI,  PERRL     HENT: ear canals and TM's normal and nose and mouth without ulcers or lesions     NECK: no adenopathy, no asymmetry, masses, or scars and thyroid normal to palpation     RESP: lungs clear to auscultation - no rales, rhonchi or wheezes     CV: regular rates and rhythm, normal S1 S2, no S3 or S4 and no murmur, click or rub     ABDOMEN:  soft, nontender, no HSM or masses and bowel sounds normal     MS: Bilateral knee pain     SKIN: no suspicious lesions or rashes     NEURO: Normal strength and tone, sensory exam grossly normal, mentation intact and speech normal     PSYCH: mentation appears normal. and affect normal/bright     LYMPHATICS: No cervical adenopathy        Recent Labs   Lab Test 01/14/21  0910 07/13/20  1435    137   POTASSIUM 4.0 3.6   CR 0.92 0.93   A1C 8.1* 6.3*          Diagnostics:  Recent Results (from the past 24 hour(s))   CBC with platelets and differential    Collection Time: 03/22/21  2:10 PM   Result Value Ref Range    WBC 8.2 4.0 - 11.0 10e9/L    RBC Count 5.17 4.4 - 5.9 10e12/L    Hemoglobin 15.6 13.3 - 17.7 g/dL    Hematocrit 44.5 40.0 - 53.0 %    MCV 86 78 - 100 fl    MCH 30.2 26.5 - 33.0 pg    MCHC 35.1 31.5 - 36.5 g/dL    RDW 13.4 10.0 - 15.0 %    Platelet Count 311 150 - 450 10e9/L    % Neutrophils 71.1 %    % Lymphocytes 16.8 %    % Monocytes 8.8 %    % Eosinophils 2.7 %    % Basophils 0.6 %    Absolute Neutrophil 5.8 1.6 - 8.3 10e9/L    Absolute Lymphocytes 1.4 0.8 - 5.3 10e9/L    Absolute Monocytes 0.7 0.0 - 1.3 10e9/L    Absolute Eosinophils 0.2 0.0 - 0.7 10e9/L    Absolute Basophils 0.1 0.0 - 0.2 10e9/L    Diff Method Automated Method    Comprehensive metabolic panel (BMP + Alb, Alk Phos, ALT, AST, Total. Bili, TP)    Collection Time: 03/22/21  2:10 PM   Result Value Ref Range "    Sodium 138 133 - 144 mmol/L    Potassium 3.8 3.4 - 5.3 mmol/L    Chloride 105 94 - 109 mmol/L    Carbon Dioxide 25 20 - 32 mmol/L    Anion Gap 8 3 - 14 mmol/L    Glucose 103 (H) 70 - 99 mg/dL    Urea Nitrogen 16 7 - 30 mg/dL    Creatinine 1.04 0.66 - 1.25 mg/dL    GFR Estimate 84 >60 mL/min/[1.73_m2]    GFR Estimate If Black >90 >60 mL/min/[1.73_m2]    Calcium 9.4 8.5 - 10.1 mg/dL    Bilirubin Total 0.5 0.2 - 1.3 mg/dL    Albumin 3.9 3.4 - 5.0 g/dL    Protein Total 7.6 6.8 - 8.8 g/dL    Alkaline Phosphatase 70 40 - 150 U/L     (H) 0 - 70 U/L    AST 99 (H) 0 - 45 U/L   Hemoglobin A1c    Collection Time: 03/22/21  2:10 PM   Result Value Ref Range    Hemoglobin A1C 6.5 (H) 0 - 5.6 %   Estimated Average Glucose    Collection Time: 03/22/21  2:10 PM   Result Value Ref Range    Estimated Average Glucose 140 mg/dL          EKG: appears normal, NSR       Revised Cardiac Risk Index (RCRI):  The patient has the following serious cardiovascular risks for perioperative complications:   - No serious cardiac risks = 0 points       RCRI Interpretation: 0 points: Class I (very low risk - 0.4% complication rate)        Assessment & Plan     The proposed surgical procedure is considered LOW risk.      Preop general physical exam  - EKG 12-lead complete w/read - (Clinic Performed)  - CBC with platelets and differential  - Comprehensive metabolic panel (BMP + Alb, Alk Phos, ALT, AST, Total. Bili, TP)  - Hemoglobin A1c    Acute meniscal tear of left knee, sequela  - As above      Type 2 diabetes mellitus without complication, without long-term current use of insulin (H)  - Hemoglobin A1c         Risks and Recommendations:  The patient has the following additional risks and recommendations for perioperative complications:   - No identified additional risk factors other than previously addressed        RECOMMENDATION:  APPROVAL GIVEN to proceed with proposed procedure, without further diagnostic evaluation.    [id number]}     Signed  Electronically by: Samira Whitney CNP  Copy of this evaluation report is provided to requesting physician.

## 2021-03-22 ENCOUNTER — OFFICE VISIT (OUTPATIENT)
Dept: FAMILY MEDICINE | Facility: OTHER | Age: 51
End: 2021-03-22
Attending: NURSE PRACTITIONER
Payer: COMMERCIAL

## 2021-03-22 VITALS
BODY MASS INDEX: 39.17 KG/M2 | RESPIRATION RATE: 18 BRPM | HEIGHT: 75 IN | TEMPERATURE: 97.6 F | OXYGEN SATURATION: 96 % | SYSTOLIC BLOOD PRESSURE: 128 MMHG | HEART RATE: 93 BPM | DIASTOLIC BLOOD PRESSURE: 72 MMHG | WEIGHT: 315 LBS

## 2021-03-22 DIAGNOSIS — E11.9 TYPE 2 DIABETES MELLITUS WITHOUT COMPLICATION, WITHOUT LONG-TERM CURRENT USE OF INSULIN (H): ICD-10-CM

## 2021-03-22 DIAGNOSIS — Z01.818 PREOP GENERAL PHYSICAL EXAM: Primary | ICD-10-CM

## 2021-03-22 DIAGNOSIS — S83.207S ACUTE MENISCAL TEAR OF LEFT KNEE, SEQUELA: ICD-10-CM

## 2021-03-22 LAB
ALBUMIN SERPL-MCNC: 3.9 G/DL (ref 3.4–5)
ALP SERPL-CCNC: 70 U/L (ref 40–150)
ALT SERPL W P-5'-P-CCNC: 157 U/L (ref 0–70)
ANION GAP SERPL CALCULATED.3IONS-SCNC: 8 MMOL/L (ref 3–14)
AST SERPL W P-5'-P-CCNC: 99 U/L (ref 0–45)
BASOPHILS # BLD AUTO: 0.1 10E9/L (ref 0–0.2)
BASOPHILS NFR BLD AUTO: 0.6 %
BILIRUB SERPL-MCNC: 0.5 MG/DL (ref 0.2–1.3)
BUN SERPL-MCNC: 16 MG/DL (ref 7–30)
CALCIUM SERPL-MCNC: 9.4 MG/DL (ref 8.5–10.1)
CHLORIDE SERPL-SCNC: 105 MMOL/L (ref 94–109)
CO2 SERPL-SCNC: 25 MMOL/L (ref 20–32)
CREAT SERPL-MCNC: 1.04 MG/DL (ref 0.66–1.25)
DIFFERENTIAL METHOD BLD: NORMAL
EOSINOPHIL # BLD AUTO: 0.2 10E9/L (ref 0–0.7)
EOSINOPHIL NFR BLD AUTO: 2.7 %
ERYTHROCYTE [DISTWIDTH] IN BLOOD BY AUTOMATED COUNT: 13.4 % (ref 10–15)
EST. AVERAGE GLUCOSE BLD GHB EST-MCNC: 140 MG/DL
GFR SERPL CREATININE-BSD FRML MDRD: 84 ML/MIN/{1.73_M2}
GLUCOSE SERPL-MCNC: 103 MG/DL (ref 70–99)
HBA1C MFR BLD: 6.5 % (ref 0–5.6)
HCT VFR BLD AUTO: 44.5 % (ref 40–53)
HGB BLD-MCNC: 15.6 G/DL (ref 13.3–17.7)
LYMPHOCYTES # BLD AUTO: 1.4 10E9/L (ref 0.8–5.3)
LYMPHOCYTES NFR BLD AUTO: 16.8 %
MCH RBC QN AUTO: 30.2 PG (ref 26.5–33)
MCHC RBC AUTO-ENTMCNC: 35.1 G/DL (ref 31.5–36.5)
MCV RBC AUTO: 86 FL (ref 78–100)
MONOCYTES # BLD AUTO: 0.7 10E9/L (ref 0–1.3)
MONOCYTES NFR BLD AUTO: 8.8 %
NEUTROPHILS # BLD AUTO: 5.8 10E9/L (ref 1.6–8.3)
NEUTROPHILS NFR BLD AUTO: 71.1 %
PLATELET # BLD AUTO: 311 10E9/L (ref 150–450)
POTASSIUM SERPL-SCNC: 3.8 MMOL/L (ref 3.4–5.3)
PROT SERPL-MCNC: 7.6 G/DL (ref 6.8–8.8)
RBC # BLD AUTO: 5.17 10E12/L (ref 4.4–5.9)
SODIUM SERPL-SCNC: 138 MMOL/L (ref 133–144)
WBC # BLD AUTO: 8.2 10E9/L (ref 4–11)

## 2021-03-22 PROCEDURE — 99214 OFFICE O/P EST MOD 30 MIN: CPT | Mod: 25 | Performed by: NURSE PRACTITIONER

## 2021-03-22 PROCEDURE — 80053 COMPREHEN METABOLIC PANEL: CPT | Performed by: NURSE PRACTITIONER

## 2021-03-22 PROCEDURE — 85025 COMPLETE CBC W/AUTO DIFF WBC: CPT | Performed by: NURSE PRACTITIONER

## 2021-03-22 PROCEDURE — 93000 ELECTROCARDIOGRAM COMPLETE: CPT | Performed by: INTERNAL MEDICINE

## 2021-03-22 PROCEDURE — 36415 COLL VENOUS BLD VENIPUNCTURE: CPT | Performed by: NURSE PRACTITIONER

## 2021-03-22 PROCEDURE — 83036 HEMOGLOBIN GLYCOSYLATED A1C: CPT | Performed by: NURSE PRACTITIONER

## 2021-03-22 ASSESSMENT — MIFFLIN-ST. JEOR: SCORE: 2538.22

## 2021-03-22 ASSESSMENT — PAIN SCALES - GENERAL: PAINLEVEL: NO PAIN (0)

## 2021-03-22 NOTE — NURSING NOTE
"Chief Complaint   Patient presents with     Pre-Op Exam       Initial /72   Pulse 93   Temp 97.6  F (36.4  C) (Tympanic)   Resp 18   Ht 1.905 m (6' 3\")   Wt (!) 158.8 kg (350 lb)   SpO2 96%   BMI 43.75 kg/m   Estimated body mass index is 43.75 kg/m  as calculated from the following:    Height as of this encounter: 1.905 m (6' 3\").    Weight as of this encounter: 158.8 kg (350 lb).  Medication Reconciliation: complete  Soniya Danielle LPN  "

## 2021-03-22 NOTE — PATIENT INSTRUCTIONS
Assessment & Plan       The proposed surgical procedure is considered LOW risk.      Preop general physical exam  - EKG 12-lead complete w/read - (Clinic Performed)  - CBC with platelets and differential  - Comprehensive metabolic panel (BMP + Alb, Alk Phos, ALT, AST, Total. Bili, TP)  - Hemoglobin A1c    Acute meniscal tear of left knee, sequela  - As above      Type 2 diabetes mellitus without complication, without long-term current use of insulin (H)  - Hemoglobin A1c        [facility]  [phone]

## 2021-03-23 ENCOUNTER — TELEPHONE (OUTPATIENT)
Dept: FAMILY MEDICINE | Facility: OTHER | Age: 51
End: 2021-03-23

## 2021-03-23 ENCOUNTER — ANESTHESIA EVENT (OUTPATIENT)
Dept: SURGERY | Facility: HOSPITAL | Age: 51
End: 2021-03-23
Payer: COMMERCIAL

## 2021-03-23 RX ORDER — FENTANYL CITRATE 50 UG/ML
25-50 INJECTION, SOLUTION INTRAMUSCULAR; INTRAVENOUS
Status: CANCELLED | OUTPATIENT
Start: 2021-03-23

## 2021-03-23 NOTE — ANESTHESIA PREPROCEDURE EVALUATION
Anesthesia Pre-Procedure Evaluation    Patient: Jason Duncan   MRN: [id number] : 1970        Preoperative Diagnosis: Left knee pain, unspecified chronicity [M25.562]   Procedure : Procedure(s):  LEFT KNEE ARTHROSCOPY:PARTIAL MEDIAL MENISECTOMY, PARTIAL LATERAL MENISECTOMY     Past Medical History:   Diagnosis Date     Allergic rhinitis      Benign essential hypertension 2008     Problem list name updated by automated process. Provider to review     Chronic gout of multiple sites 2019     Cramp of limb 2018     Hyperlipidemia LDL goal <100 2019     RACHELE (obstructive sleep apnea)      Primary insomnia 2019     Primary osteoarthritis of left knee 2020     Type 2 diabetes mellitus without complication, without long-term current use of insulin (H) 7/10/2018     Vitamin D deficiency 2018      Past Surgical History:   Procedure Laterality Date     GASTRIC RESTRICTIVE PROCEDURE, OPEN, REMOVE/REPLACE SUBCUTANEOUS PORT       GI SURGERY      removal of lap band      lap band       nasal septoplasty       SEPTOPLASTY      Nasal      No Known Allergies   Social History     Tobacco Use     Smoking status: Never Smoker     Smokeless tobacco: Never Used   Substance Use Topics     Alcohol use: Yes     Comment: rarely      Wt Readings from Last 1 Encounters:   21 (!) 158.8 kg (350 lb)        Anesthesia Evaluation   Pt has had prior anesthetic. Type: General.    No history of anesthetic complications       ROS/MED HX  ENT/Pulmonary: Comment: S/p septoplasty    (+) sleep apnea, uses CPAP, allergic rhinitis,     Neurologic: Comment: Hearing loss      Cardiovascular:     (+) Dyslipidemia hypertension-----    METS/Exercise Tolerance: >4 METS    Hematologic:  - neg hematologic  ROS     Musculoskeletal:   (+) arthritis (oa, gout),     GI/Hepatic: Comment: S/p lap band      Renal/Genitourinary:  - neg Renal ROS     Endo: Comment: Vitamin D deficiency    (+) type II DM, Last HgA1c:  8.1, date: 1/14/2021, Not using insulin, Obesity,     Psychiatric/Substance Use:     (+) psychiatric history other (comment) (insomnia)     Infectious Disease:  - neg infectious disease ROS     Malignancy:  - neg malignancy ROS     Other:  - neg other ROS          Physical Exam    Airway        Mallampati: III   TM distance: > 3 FB   Neck ROM: full   Mouth opening: < 3 cm    Respiratory Devices and Support         Dental  no notable dental history         Cardiovascular   cardiovascular exam normal       Rhythm and rate: regular and normal     Pulmonary   pulmonary exam normal        breath sounds clear to auscultation           OUTSIDE LABS:  CBC:   Lab Results   Component Value Date    WBC 8.2 03/22/2021    WBC 9.1 10/30/2017    HGB 15.6 03/22/2021    HGB 15.8 10/30/2017    HCT 44.5 03/22/2021    HCT 44.8 10/30/2017     03/22/2021     10/30/2017     BMP:   Lab Results   Component Value Date     03/22/2021     01/14/2021    POTASSIUM 3.8 03/22/2021    POTASSIUM 4.0 01/14/2021    CHLORIDE 105 03/22/2021    CHLORIDE 107 01/14/2021    CO2 25 03/22/2021    CO2 26 01/14/2021    BUN 16 03/22/2021    BUN 13 01/14/2021    CR 1.04 03/22/2021    CR 0.92 01/14/2021     (H) 03/22/2021     (H) 01/14/2021     COAGS: No results found for: PTT, INR, FIBR  POC: No results found for: BGM, HCG, HCGS  HEPATIC:   Lab Results   Component Value Date    ALBUMIN 3.9 03/22/2021    PROTTOTAL 7.6 03/22/2021     (H) 03/22/2021    AST 99 (H) 03/22/2021    ALKPHOS 70 03/22/2021    BILITOTAL 0.5 03/22/2021     OTHER:   Lab Results   Component Value Date    A1C 6.5 (H) 03/22/2021    BRENDON 9.4 03/22/2021    MAG 2.1 07/03/2020    TSH 1.52 01/14/2021    SED 12 12/02/2013       Anesthesia Plan    ASA Status:  3   NPO Status:  NPO Appropriate (0830 pills with water)    Anesthesia Type: General.     - Airway: LMA   Induction: Intravenous.   Maintenance: Balanced.        Consents    Anesthesia Plan(s) and  associated risks, benefits, and realistic alternatives discussed. Questions answered and patient/representative(s) expressed understanding.     - Discussed with:  Patient         Postoperative Care       PONV prophylaxis: Ondansetron (or other 5HT-3)     Comments:    H&P 3/22  Discussed risks and benefits with patient for general anesthesia including sore throat, nausea, vomiting, aspiration, dental damage, loss of airway, CV complications, stroke, MI, death. Pt wishes to proceed.             RODRIGUEZ Smith CRNA

## 2021-03-23 NOTE — TELEPHONE ENCOUNTER
Patient forgot to  the Hibiclens when he had his pre op appointment yesterday.  He is coming to the clinic to pick it up.

## 2021-03-26 ENCOUNTER — OFFICE VISIT (OUTPATIENT)
Dept: FAMILY MEDICINE | Facility: OTHER | Age: 51
End: 2021-03-26
Attending: NURSE PRACTITIONER
Payer: COMMERCIAL

## 2021-03-26 DIAGNOSIS — Z20.822 COVID-19 RULED OUT: Primary | ICD-10-CM

## 2021-03-26 LAB
SARS-COV-2 RNA RESP QL NAA+PROBE: NORMAL
SPECIMEN SOURCE: NORMAL

## 2021-03-26 PROCEDURE — U0003 INFECTIOUS AGENT DETECTION BY NUCLEIC ACID (DNA OR RNA); SEVERE ACUTE RESPIRATORY SYNDROME CORONAVIRUS 2 (SARS-COV-2) (CORONAVIRUS DISEASE [COVID-19]), AMPLIFIED PROBE TECHNIQUE, MAKING USE OF HIGH THROUGHPUT TECHNOLOGIES AS DESCRIBED BY CMS-2020-01-R: HCPCS | Performed by: NURSE PRACTITIONER

## 2021-03-26 PROCEDURE — 99207 PR NO CHARGE NURSE ONLY: CPT

## 2021-03-26 PROCEDURE — U0005 INFEC AGEN DETEC AMPLI PROBE: HCPCS | Performed by: NURSE PRACTITIONER

## 2021-03-27 LAB
LABORATORY COMMENT REPORT: NORMAL
SARS-COV-2 RNA RESP QL NAA+PROBE: NEGATIVE
SPECIMEN SOURCE: NORMAL

## 2021-03-29 ENCOUNTER — TELEPHONE (OUTPATIENT)
Dept: FAMILY MEDICINE | Facility: OTHER | Age: 51
End: 2021-03-29

## 2021-03-29 NOTE — TELEPHONE ENCOUNTER
Patient calling and states he received call from [place]. Nurse was trying to call to give results of patient's covid test. Patient states he did not receive voice mail with this message. Notified patient his covid results were negative. Patient verbalized understanding.

## 2021-03-30 ENCOUNTER — ANESTHESIA (OUTPATIENT)
Dept: SURGERY | Facility: HOSPITAL | Age: 51
End: 2021-03-30
Payer: COMMERCIAL

## 2021-03-30 ENCOUNTER — HOSPITAL ENCOUNTER (OUTPATIENT)
Facility: HOSPITAL | Age: 51
Discharge: HOME | End: 2021-03-30
Attending: ORTHOPAEDIC SURGERY | Admitting: ORTHOPAEDIC SURGERY
Payer: COMMERCIAL

## 2021-03-30 VITALS
TEMPERATURE: 97.3 F | HEART RATE: 81 BPM | WEIGHT: 315 LBS | OXYGEN SATURATION: 93 % | RESPIRATION RATE: 18 BRPM | SYSTOLIC BLOOD PRESSURE: 143 MMHG | BODY MASS INDEX: 40.43 KG/M2 | HEIGHT: 74 IN | DIASTOLIC BLOOD PRESSURE: 90 MMHG

## 2021-03-30 DIAGNOSIS — Z98.890 STATUS POST ARTHROSCOPY OF LEFT KNEE: Primary | ICD-10-CM

## 2021-03-30 PROCEDURE — 258N000003 HC RX IP 258 OP 636: Performed by: ORTHOPAEDIC SURGERY

## 2021-03-30 PROCEDURE — 710N000012 HC RECOVERY PHASE 2, PER MINUTE: Performed by: ORTHOPAEDIC SURGERY

## 2021-03-30 PROCEDURE — 250N000009 HC RX 250: Performed by: ORTHOPAEDIC SURGERY

## 2021-03-30 PROCEDURE — 29881 ARTHRS KNE SRG MNISECTMY M/L: CPT | Performed by: NURSE ANESTHETIST, CERTIFIED REGISTERED

## 2021-03-30 PROCEDURE — 999N000141 HC STATISTIC PRE-PROCEDURE NURSING ASSESSMENT: Performed by: ORTHOPAEDIC SURGERY

## 2021-03-30 PROCEDURE — 258N000003 HC RX IP 258 OP 636: Performed by: NURSE ANESTHETIST, CERTIFIED REGISTERED

## 2021-03-30 PROCEDURE — 250N000011 HC RX IP 250 OP 636: Performed by: NURSE ANESTHETIST, CERTIFIED REGISTERED

## 2021-03-30 PROCEDURE — 250N000025 HC SEVOFLURANE, PER MIN: Performed by: NURSE ANESTHETIST, CERTIFIED REGISTERED

## 2021-03-30 PROCEDURE — 250N000011 HC RX IP 250 OP 636: Performed by: ORTHOPAEDIC SURGERY

## 2021-03-30 PROCEDURE — 250N000009 HC RX 250: Performed by: NURSE ANESTHETIST, CERTIFIED REGISTERED

## 2021-03-30 PROCEDURE — 370N000017 HC ANESTHESIA TECHNICAL FEE, PER MIN: Performed by: ORTHOPAEDIC SURGERY

## 2021-03-30 PROCEDURE — 272N000001 HC OR GENERAL SUPPLY STERILE: Performed by: ORTHOPAEDIC SURGERY

## 2021-03-30 PROCEDURE — 360N000076 HC SURGERY LEVEL 3, PER MIN: Performed by: ORTHOPAEDIC SURGERY

## 2021-03-30 PROCEDURE — 250N000025 HC SEVOFLURANE, PER MIN: Performed by: ORTHOPAEDIC SURGERY

## 2021-03-30 PROCEDURE — 710N000010 HC RECOVERY PHASE 1, LEVEL 2, PER MIN: Performed by: ORTHOPAEDIC SURGERY

## 2021-03-30 RX ORDER — NALOXONE HYDROCHLORIDE 0.4 MG/ML
0.2 INJECTION, SOLUTION INTRAMUSCULAR; INTRAVENOUS; SUBCUTANEOUS
Status: DISCONTINUED | OUTPATIENT
Start: 2021-03-30 | End: 2021-03-30 | Stop reason: HOSPADM

## 2021-03-30 RX ORDER — BUPIVACAINE HYDROCHLORIDE AND EPINEPHRINE 2.5; 5 MG/ML; UG/ML
INJECTION, SOLUTION INFILTRATION; PERINEURAL PRN
Status: DISCONTINUED | OUTPATIENT
Start: 2021-03-30 | End: 2021-03-30 | Stop reason: HOSPADM

## 2021-03-30 RX ORDER — HYDRALAZINE HYDROCHLORIDE 20 MG/ML
2.5-5 INJECTION INTRAMUSCULAR; INTRAVENOUS EVERY 10 MIN PRN
Status: DISCONTINUED | OUTPATIENT
Start: 2021-03-30 | End: 2021-03-30 | Stop reason: HOSPADM

## 2021-03-30 RX ORDER — PROPOFOL 10 MG/ML
INJECTION, EMULSION INTRAVENOUS PRN
Status: DISCONTINUED | OUTPATIENT
Start: 2021-03-30 | End: 2021-03-30

## 2021-03-30 RX ORDER — OXYCODONE HYDROCHLORIDE 5 MG/1
5 TABLET ORAL
Status: CANCELLED | OUTPATIENT
Start: 2021-03-30

## 2021-03-30 RX ORDER — SODIUM CHLORIDE, SODIUM LACTATE, POTASSIUM CHLORIDE, CALCIUM CHLORIDE 600; 310; 30; 20 MG/100ML; MG/100ML; MG/100ML; MG/100ML
INJECTION, SOLUTION INTRAVENOUS CONTINUOUS
Status: DISCONTINUED | OUTPATIENT
Start: 2021-03-30 | End: 2021-03-30 | Stop reason: HOSPADM

## 2021-03-30 RX ORDER — HYDROMORPHONE HYDROCHLORIDE 1 MG/ML
.3-.5 INJECTION, SOLUTION INTRAMUSCULAR; INTRAVENOUS; SUBCUTANEOUS
Status: DISCONTINUED | OUTPATIENT
Start: 2021-03-30 | End: 2021-03-30 | Stop reason: HOSPADM

## 2021-03-30 RX ORDER — TRIAMCINOLONE ACETONIDE 40 MG/ML
INJECTION, SUSPENSION INTRA-ARTICULAR; INTRAMUSCULAR PRN
Status: DISCONTINUED | OUTPATIENT
Start: 2021-03-30 | End: 2021-03-30 | Stop reason: HOSPADM

## 2021-03-30 RX ORDER — ONDANSETRON 2 MG/ML
INJECTION INTRAMUSCULAR; INTRAVENOUS PRN
Status: DISCONTINUED | OUTPATIENT
Start: 2021-03-30 | End: 2021-03-30

## 2021-03-30 RX ORDER — MEPERIDINE HYDROCHLORIDE 25 MG/ML
12.5 INJECTION INTRAMUSCULAR; INTRAVENOUS; SUBCUTANEOUS
Status: DISCONTINUED | OUTPATIENT
Start: 2021-03-30 | End: 2021-03-30 | Stop reason: HOSPADM

## 2021-03-30 RX ORDER — TRIAMCINOLONE ACETONIDE 40 MG/ML
INJECTION, SUSPENSION INTRA-ARTICULAR; INTRAMUSCULAR
Status: DISCONTINUED
Start: 2021-03-30 | End: 2021-03-30 | Stop reason: HOSPADM

## 2021-03-30 RX ORDER — ONDANSETRON 4 MG/1
4 TABLET, ORALLY DISINTEGRATING ORAL EVERY 30 MIN PRN
Status: DISCONTINUED | OUTPATIENT
Start: 2021-03-30 | End: 2021-03-30 | Stop reason: HOSPADM

## 2021-03-30 RX ORDER — LABETALOL 20 MG/4 ML (5 MG/ML) INTRAVENOUS SYRINGE
10
Status: DISCONTINUED | OUTPATIENT
Start: 2021-03-30 | End: 2021-03-30 | Stop reason: HOSPADM

## 2021-03-30 RX ORDER — LIDOCAINE HYDROCHLORIDE 20 MG/ML
INJECTION, SOLUTION INFILTRATION; PERINEURAL PRN
Status: DISCONTINUED | OUTPATIENT
Start: 2021-03-30 | End: 2021-03-30

## 2021-03-30 RX ORDER — LIDOCAINE 40 MG/G
CREAM TOPICAL
Status: DISCONTINUED | OUTPATIENT
Start: 2021-03-30 | End: 2021-03-30 | Stop reason: HOSPADM

## 2021-03-30 RX ORDER — NALOXONE HYDROCHLORIDE 0.4 MG/ML
0.4 INJECTION, SOLUTION INTRAMUSCULAR; INTRAVENOUS; SUBCUTANEOUS
Status: DISCONTINUED | OUTPATIENT
Start: 2021-03-30 | End: 2021-03-30 | Stop reason: HOSPADM

## 2021-03-30 RX ORDER — KETAMINE HYDROCHLORIDE 10 MG/ML
INJECTION INTRAMUSCULAR; INTRAVENOUS PRN
Status: DISCONTINUED | OUTPATIENT
Start: 2021-03-30 | End: 2021-03-30

## 2021-03-30 RX ORDER — FENTANYL CITRATE 50 UG/ML
INJECTION, SOLUTION INTRAMUSCULAR; INTRAVENOUS PRN
Status: DISCONTINUED | OUTPATIENT
Start: 2021-03-30 | End: 2021-03-30

## 2021-03-30 RX ORDER — OXYCODONE HYDROCHLORIDE 5 MG/1
5-10 TABLET ORAL EVERY 4 HOURS PRN
Qty: 10 TABLET | Refills: 0 | Status: SHIPPED | OUTPATIENT
Start: 2021-03-30 | End: 2021-06-25

## 2021-03-30 RX ORDER — FENTANYL CITRATE 50 UG/ML
25-50 INJECTION, SOLUTION INTRAMUSCULAR; INTRAVENOUS EVERY 5 MIN PRN
Status: DISCONTINUED | OUTPATIENT
Start: 2021-03-30 | End: 2021-03-30 | Stop reason: HOSPADM

## 2021-03-30 RX ORDER — ONDANSETRON 2 MG/ML
4 INJECTION INTRAMUSCULAR; INTRAVENOUS EVERY 30 MIN PRN
Status: DISCONTINUED | OUTPATIENT
Start: 2021-03-30 | End: 2021-03-30 | Stop reason: HOSPADM

## 2021-03-30 RX ORDER — CEFAZOLIN SODIUM IN 0.9 % NACL 3 G/100 ML
3 INTRAVENOUS SOLUTION, PIGGYBACK (ML) INTRAVENOUS
Status: COMPLETED | OUTPATIENT
Start: 2021-03-30 | End: 2021-03-30

## 2021-03-30 RX ADMIN — CEFAZOLIN SODIUM 3 G: 10 INJECTION, POWDER, FOR SOLUTION INTRAVENOUS at 12:48

## 2021-03-30 RX ADMIN — SODIUM CHLORIDE, POTASSIUM CHLORIDE, SODIUM LACTATE AND CALCIUM CHLORIDE: 600; 310; 30; 20 INJECTION, SOLUTION INTRAVENOUS at 12:11

## 2021-03-30 RX ADMIN — PROPOFOL 300 MG: 10 INJECTION, EMULSION INTRAVENOUS at 12:44

## 2021-03-30 RX ADMIN — FENTANYL CITRATE 100 MCG: 50 INJECTION, SOLUTION INTRAMUSCULAR; INTRAVENOUS at 12:44

## 2021-03-30 RX ADMIN — PROPOFOL 80 MG: 10 INJECTION, EMULSION INTRAVENOUS at 12:57

## 2021-03-30 RX ADMIN — LIDOCAINE HYDROCHLORIDE 40 MG: 20 INJECTION, SOLUTION INFILTRATION; PERINEURAL at 12:44

## 2021-03-30 RX ADMIN — ONDANSETRON 4 MG: 2 INJECTION INTRAMUSCULAR; INTRAVENOUS at 13:07

## 2021-03-30 RX ADMIN — KETAMINE HYDROCHLORIDE 30 MG: 10 INJECTION, SOLUTION INTRAMUSCULAR; INTRAVENOUS at 12:59

## 2021-03-30 ASSESSMENT — MIFFLIN-ST. JEOR: SCORE: 2511.45

## 2021-03-30 NOTE — BRIEF OP NOTE
[facility]    Brief Operative Note    Pre-operative diagnosis: Left knee pain, unspecified chronicity [M25.562]  Post-operative diagnosis Same as pre-operative diagnosis    Procedure: Procedure(s):  LEFT KNEE ARTHROSCOPY:PARTIAL MEDIAL MENISECTOMY, PARTIAL LATERAL MENISECTOMY  Surgeon: Surgeon(s) and Role:     * Donny Au MD - Primary     * Guy Segura PA-C - Assisting  Anesthesia: General   Estimated blood loss: Minimal  Drains: None  Specimens: * No specimens in log *  Findings:   None.  Complications: None.  Implants: * No implants in log *      [id number]

## 2021-03-30 NOTE — OR NURSING
Crutches fit to patient and instruction given on crutch training with partial weight bearing.  He demonstrated good understanding.

## 2021-03-30 NOTE — ANESTHESIA POSTPROCEDURE EVALUATION
Patient: Jason Duncan    Procedure(s):  LEFT KNEE ARTHROSCOPY:PARTIAL MEDIAL MENISECTOMY, PARTIAL LATERAL MENISECTOMY    Diagnosis:Left knee pain, unspecified chronicity [M25.562]  Diagnosis Additional Information: No value filed.    Anesthesia Type:  General    Note:  Disposition: Outpatient   Postop Pain Control: Uneventful            Sign Out: Well controlled pain   PONV: No   Neuro/Psych: Uneventful            Sign Out: Acceptable/Baseline neuro status   Airway/Respiratory: Uneventful            Sign Out: Acceptable/Baseline resp. status   CV/Hemodynamics: Uneventful            Sign Out: Acceptable CV status   Other NRE: NONE   DID A NON-ROUTINE EVENT OCCUR? No         Last vitals:  Vitals:    03/30/21 1430 03/30/21 1445 03/30/21 1500   BP: 140/80 140/80 143/90   Pulse: 76 71 81   Resp: 16 18 18   Temp:   97.3  F (36.3  C)   SpO2: 96% 93% 93%       Last vitals prior to Anesthesia Care Transfer:  CRNA VITALS  3/30/2021 1253 - 3/30/2021 1353      3/30/2021             Resp Rate (observed):  (!) 1    Resp Rate (set):  8          Electronically Signed By: RODRIGUEZ Jacob CRNA  March 30, 2021  4:09 PM

## 2021-03-30 NOTE — OR NURSING
Patient and responsible adult given discharge instructions with no questions regarding instructions. Janee score 19/20. Denies pain.  Discharged from unit via wheelchair. Patient discharged to home with wife. Tolerating PO intake. Dressing CDI.

## 2021-03-30 NOTE — ANESTHESIA CARE TRANSFER NOTE
Patient: Jason Duncan    Procedure(s):  LEFT KNEE ARTHROSCOPY:PARTIAL MEDIAL MENISECTOMY, PARTIAL LATERAL MENISECTOMY    Diagnosis: Left knee pain, unspecified chronicity [M25.562]  Diagnosis Additional Information: No value filed.    Anesthesia Type:   General     Note:    Oropharynx: oropharynx clear of all foreign objects and spontaneously breathing  Level of Consciousness: awake  Oxygen Supplementation: nasal cannula  Level of Supplemental Oxygen (L/min / FiO2): 3  Independent Airway: airway patency satisfactory and stable  Dentition: dentition unchanged  Vital Signs Stable: post-procedure vital signs reviewed and stable  Report to RN Given: handoff report given  Patient transferred to: PACU    Handoff Report: Identifed the Patient, Identified the Reponsible Provider, Reviewed the pertinent medical history, Discussed the surgical course, Reviewed Intra-OP anesthesia mangement and issues during anesthesia, Set expectations for post-procedure period and Allowed opportunity for questions and acknowledgement of understanding      Vitals: (Last set prior to Anesthesia Care Transfer)  CRNA VITALS  3/30/2021 1253 - 3/30/2021 1331      3/30/2021             Resp Rate (observed):  (!) 1    Resp Rate (set):  8        Electronically Signed By: RODRIGUEZ Salazar CRNA  March 30, 2021  1:31 PM

## 2021-03-30 NOTE — OP NOTE
Procedure Date: 03/30/2021      PREOPERATIVE DIAGNOSES:   1.  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   2.  Left knee grade 3 and 4 chondromalacia of lateral femoral condyle.   3.  Grade 3 chondromalacia of medial femoral condyle.   4.  Degenerative medial and lateral meniscus tears.      POSTOPERATIVE DIAGNOSES:   1.  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   2.  Left knee grade 3 and 4 chondromalacia of lateral femoral condyle.   3.  Grade 3 chondromalacia of medial femoral condyle.   4.  Degenerative medial and lateral meniscus tears.      PROCEDURES:   1.  Left knee arthroscopy with partial medial and lateral meniscectomy.   2.  Left knee arthroscopy and chondroplasty of the patellofemoral joint and lateral compartment.      SURGEON:  Donny Au MD      ASSISTANT:  Silverio Segura PA-C.  A skilled first assistant was needed for patient positioning, retraction and carrying out the procedure in a safe and effective manner.      ANESTHESIA:   1.  General by LMA.   2.  Local injection.      FINDINGS:   1.  Advanced arthritic change throughout the anterior mediolateral compartment.   2.  Satisfactory debridement.      IMPLANTS:  None.      SPECIMENS:  None.      DRAINS:  None.      COMPLICATIONS:  None.      ESTIMATED BLOOD LOSS:  5 mL      TOURNIQUET TIME:  15 minutes.      INDICATIONS:  The patient is a 51-year-old male with progressive left knee pain and problems with mobility.  He is having mechanical symptoms.  He had an MRI showing meniscal tearing and chondromalacia.  He wanted to proceed with debridement to try to buy him some time before replacing in the future.  The procedure, risks, benefits, consent was obtained.      DESCRIPTION OF PROCEDURE:  The patient was seen in the preoperative area, surgical site was marked.  Questions were answered.  He was taken to the operating room, placed under general anesthesia.  His left leg was placed in an arthroscopic  leg gant and was prepped and draped in sterile fashion with ChloraPrep.  A timeout was called to identify the correct patient, correct surgical site.  The limb was elevated and exsanguinated and tourniquet inflated to 300 mmHg.        I established the medial and lateral portals.  Diagnostic scope revealed advanced grade 3 and 4 chondromalacia of the patellofemoral joint.  I did an anterior synovectomy and resected the plica.  ACL, PCL were intact.  There was a large intercondylar notch osteophyte.  The medial and lateral compartments were then visualized.  He had advanced chondromalacia of the lateral compartment and specifically the lateral femoral condyle.  Chondroplasty was completed.  A small peripheral tear of the lateral meniscus was noted and this was debrided as well.  I then entered the medial compartment and t there was grade 3 chondromalacia of the medial femoral condyle.  A small peripheral medial meniscus tear was debrided with a shaver.        I then removed the scope instruments.  His knee was injected with cortisone to prevent an arthritic flare.  The wounds were closed.  Sterile dressings were applied.  Tourniquet was deflated.  He was awakened, extubated and transferred to PACU in stable condition.      POSTOPERATIVE PLAN:  Weightbear as tolerated, range of motion as tolerated to his left knee.  Follow-up in a week for a wound check and suture removal.  Recommend future weight loss for him.         KRISTOFER CHAVES MD             D: 2021   T: 2021   MT: BRIGIDA      Name:     LEATHA OSPINA   MRN:      [id number]        Account:        [id number]   :      1970           Procedure Date: 2021      Document: [id number]

## 2021-04-02 DIAGNOSIS — I10 BENIGN ESSENTIAL HYPERTENSION: ICD-10-CM

## 2021-04-05 RX ORDER — LISINOPRIL 10 MG/1
TABLET ORAL
Qty: 90 TABLET | Refills: 0 | Status: SHIPPED | OUTPATIENT
Start: 2021-04-05 | End: 2021-07-29

## 2021-04-05 NOTE — TELEPHONE ENCOUNTER
LISINOPRIL      Last Written Prescription Date:  9-  Last Fill Quantity: 90,   # refills: 1  Last Office Visit: 3-  Future Office visit:       Routing refill request to provider for review/approval because:

## 2021-04-13 ENCOUNTER — TRANSFERRED RECORDS (OUTPATIENT)
Dept: HEALTH INFORMATION MANAGEMENT | Facility: CLINIC | Age: 51
End: 2021-04-13

## 2021-04-15 DIAGNOSIS — J30.2 CHRONIC SEASONAL ALLERGIC RHINITIS: ICD-10-CM

## 2021-04-15 RX ORDER — MONTELUKAST SODIUM 10 MG/1
TABLET ORAL
Qty: 90 TABLET | Refills: 1 | Status: SHIPPED | OUTPATIENT
Start: 2021-04-15 | End: 2022-01-10

## 2021-05-13 DIAGNOSIS — M25.511 ACUTE PAIN OF RIGHT SHOULDER: ICD-10-CM

## 2021-05-14 RX ORDER — CYCLOBENZAPRINE HCL 10 MG
TABLET ORAL
Qty: 90 TABLET | Refills: 0 | Status: SHIPPED | OUTPATIENT
Start: 2021-05-14 | End: 2021-08-16

## 2021-05-14 NOTE — TELEPHONE ENCOUNTER
flexeril      Last Written Prescription Date:  9/16/19  Last Fill Quantity: 90,   # refills: 3  Last Office Visit: 3/22/21  Future Office visit:    Next 5 appointments (look out 90 days)    Jul 12, 2021  8:15 AM  (Arrive by 8:00 AM)  SHORT with Samira Whitney CNP  [facility] ([facility] ) [address]  [place] [address]  [phone]

## 2021-06-01 DIAGNOSIS — J30.2 SEASONAL ALLERGIC RHINITIS, UNSPECIFIED TRIGGER: ICD-10-CM

## 2021-06-01 RX ORDER — LORATADINE 10 MG/1
TABLET ORAL
Qty: 30 TABLET | Refills: 0 | Status: SHIPPED | OUTPATIENT
Start: 2021-06-01 | End: 2021-08-16

## 2021-06-01 NOTE — TELEPHONE ENCOUNTER
Allergy relief 10 mg      Last Written Prescription Date:  7/17/20  Last Fill Quantity: 30,   # refills: 5  Last Office Visit: 3/26/21  Future Office visit:    Next 5 appointments (look out 90 days)    Jul 12, 2021  8:15 AM  (Arrive by 8:00 AM)  SHORT with Samira Whitney CNP  [facility] ([facility] ) [address]  [facility] [address]  [phone]           Routing refill request to provider for review/approval because:  Drug not on the [facility], [facility] or [facility] refill protocol or controlled substance

## 2021-07-29 DIAGNOSIS — I10 BENIGN ESSENTIAL HYPERTENSION: ICD-10-CM

## 2021-07-29 RX ORDER — LISINOPRIL 10 MG/1
TABLET ORAL
Qty: 90 TABLET | Refills: 0 | Status: SHIPPED | OUTPATIENT
Start: 2021-07-29 | End: 2022-01-10

## 2021-07-29 NOTE — TELEPHONE ENCOUNTER
Zestril 10mg      Last Written Prescription Date:  4/5/21  Last Fill Quantity: 90,   # refills: 0  Last Office Visit: 7/12/21  Future Office visit:       Routing refill request to provider for review/approval because:

## 2021-08-16 DIAGNOSIS — M25.511 ACUTE PAIN OF RIGHT SHOULDER: ICD-10-CM

## 2021-08-16 DIAGNOSIS — J30.2 SEASONAL ALLERGIC RHINITIS, UNSPECIFIED TRIGGER: ICD-10-CM

## 2021-08-16 RX ORDER — LORATADINE 10 MG/1
TABLET ORAL
Qty: 30 TABLET | Refills: 0 | Status: SHIPPED | OUTPATIENT
Start: 2021-08-16 | End: 2021-09-30

## 2021-08-16 RX ORDER — CYCLOBENZAPRINE HCL 10 MG
TABLET ORAL
Qty: 90 TABLET | Refills: 0 | Status: SHIPPED | OUTPATIENT
Start: 2021-08-16 | End: 2021-11-26

## 2021-09-17 DIAGNOSIS — E78.5 HYPERLIPIDEMIA LDL GOAL <100: ICD-10-CM

## 2021-09-17 RX ORDER — SIMVASTATIN 10 MG
TABLET ORAL
Qty: 90 TABLET | Refills: 0 | Status: SHIPPED | OUTPATIENT
Start: 2021-09-17 | End: 2022-01-10

## 2021-09-25 ENCOUNTER — HEALTH MAINTENANCE LETTER (OUTPATIENT)
Age: 51
End: 2021-09-25

## 2021-09-29 DIAGNOSIS — J30.2 SEASONAL ALLERGIC RHINITIS, UNSPECIFIED TRIGGER: ICD-10-CM

## 2021-09-29 DIAGNOSIS — E11.9 TYPE 2 DIABETES MELLITUS WITHOUT COMPLICATION, WITHOUT LONG-TERM CURRENT USE OF INSULIN (H): ICD-10-CM

## 2021-09-30 RX ORDER — LORATADINE 10 MG/1
TABLET ORAL
Qty: 30 TABLET | Refills: 0 | Status: SHIPPED | OUTPATIENT
Start: 2021-09-30 | End: 2021-11-26

## 2021-09-30 RX ORDER — SITAGLIPTIN 100 MG/1
TABLET, FILM COATED ORAL
Qty: 90 TABLET | Refills: 0 | Status: SHIPPED | OUTPATIENT
Start: 2021-09-30 | End: 2022-03-07

## 2021-09-30 NOTE — TELEPHONE ENCOUNTER
Claritin       Last Written Prescription Date:  8/16/2021  Last Fill Quantity: 30,   # refills: 0    Januvia       Last Written Prescription Date:  1/15/2021  Last Fill Quantity: 90,   # refills: 1  Last Office Visit: 7/12/2021  Future Office visit:

## 2021-11-26 DIAGNOSIS — M25.511 ACUTE PAIN OF RIGHT SHOULDER: ICD-10-CM

## 2021-11-26 DIAGNOSIS — I10 BENIGN ESSENTIAL HYPERTENSION: ICD-10-CM

## 2021-11-26 DIAGNOSIS — J30.2 SEASONAL ALLERGIC RHINITIS, UNSPECIFIED TRIGGER: ICD-10-CM

## 2021-11-26 RX ORDER — LORATADINE 10 MG/1
TABLET ORAL
Qty: 30 TABLET | Refills: 0 | Status: SHIPPED | OUTPATIENT
Start: 2021-11-26 | End: 2022-01-10

## 2021-11-26 RX ORDER — CYCLOBENZAPRINE HCL 10 MG
TABLET ORAL
Qty: 90 TABLET | Refills: 0 | Status: SHIPPED | OUTPATIENT
Start: 2021-11-26 | End: 2022-03-07

## 2021-11-26 RX ORDER — AMLODIPINE BESYLATE 10 MG/1
TABLET ORAL
Qty: 90 TABLET | Refills: 0 | Status: SHIPPED | OUTPATIENT
Start: 2021-11-26 | End: 2022-04-25

## 2022-01-10 DIAGNOSIS — I10 BENIGN ESSENTIAL HYPERTENSION: ICD-10-CM

## 2022-01-10 DIAGNOSIS — J30.2 CHRONIC SEASONAL ALLERGIC RHINITIS: ICD-10-CM

## 2022-01-10 DIAGNOSIS — E78.5 HYPERLIPIDEMIA LDL GOAL <100: ICD-10-CM

## 2022-01-10 DIAGNOSIS — E11.9 TYPE 2 DIABETES MELLITUS WITHOUT COMPLICATION, WITHOUT LONG-TERM CURRENT USE OF INSULIN (H): ICD-10-CM

## 2022-01-10 DIAGNOSIS — F51.01 PRIMARY INSOMNIA: ICD-10-CM

## 2022-01-10 DIAGNOSIS — J30.2 SEASONAL ALLERGIC RHINITIS, UNSPECIFIED TRIGGER: ICD-10-CM

## 2022-01-10 RX ORDER — TRAZODONE HYDROCHLORIDE 50 MG/1
TABLET, FILM COATED ORAL
Qty: 60 TABLET | Refills: 0 | Status: SHIPPED | OUTPATIENT
Start: 2022-01-10 | End: 2022-05-05

## 2022-01-10 RX ORDER — LORATADINE 10 MG/1
TABLET ORAL
Qty: 90 TABLET | Refills: 1 | Status: SHIPPED | OUTPATIENT
Start: 2022-01-10 | End: 2023-01-30

## 2022-01-10 RX ORDER — METFORMIN HCL 500 MG
TABLET, EXTENDED RELEASE 24 HR ORAL
Qty: 360 TABLET | Refills: 0 | Status: SHIPPED | OUTPATIENT
Start: 2022-01-10 | End: 2022-05-06

## 2022-01-10 RX ORDER — MONTELUKAST SODIUM 10 MG/1
TABLET ORAL
Qty: 90 TABLET | Refills: 0 | Status: SHIPPED | OUTPATIENT
Start: 2022-01-10 | End: 2022-05-06

## 2022-01-10 RX ORDER — LISINOPRIL 10 MG/1
TABLET ORAL
Qty: 90 TABLET | Refills: 0 | Status: SHIPPED | OUTPATIENT
Start: 2022-01-10 | End: 2022-05-06

## 2022-01-10 RX ORDER — SIMVASTATIN 10 MG
TABLET ORAL
Qty: 90 TABLET | Refills: 0 | Status: SHIPPED | OUTPATIENT
Start: 2022-01-10 | End: 2022-05-06

## 2022-01-13 ENCOUNTER — IMMUNIZATION (OUTPATIENT)
Dept: FAMILY MEDICINE | Facility: OTHER | Age: 52
End: 2022-01-13
Attending: NURSE PRACTITIONER
Payer: COMMERCIAL

## 2022-01-13 PROCEDURE — 91300 PR COVID VAC PFIZER DIL RECON 30 MCG/0.3 ML IM: CPT

## 2022-01-13 PROCEDURE — 0004A PR COVID VAC PFIZER DIL RECON 30 MCG/0.3 ML IM: CPT

## 2022-01-15 ENCOUNTER — HEALTH MAINTENANCE LETTER (OUTPATIENT)
Age: 52
End: 2022-01-15

## 2022-03-08 ENCOUNTER — TRANSFERRED RECORDS (OUTPATIENT)
Dept: HEALTH INFORMATION MANAGEMENT | Facility: HOSPITAL | Age: 52
End: 2022-03-08
Payer: COMMERCIAL

## 2022-03-08 LAB — RETINOPATHY: NEGATIVE

## 2022-03-11 ENCOUNTER — HOSPITAL ENCOUNTER (EMERGENCY)
Facility: HOSPITAL | Age: 52
Discharge: HOME | End: 2022-03-11
Attending: NURSE PRACTITIONER | Admitting: NURSE PRACTITIONER
Payer: COMMERCIAL

## 2022-03-11 ENCOUNTER — APPOINTMENT (OUTPATIENT)
Dept: GENERAL RADIOLOGY | Facility: HOSPITAL | Age: 52
End: 2022-03-11
Attending: NURSE PRACTITIONER
Payer: COMMERCIAL

## 2022-03-11 VITALS
SYSTOLIC BLOOD PRESSURE: 161 MMHG | RESPIRATION RATE: 18 BRPM | OXYGEN SATURATION: 94 % | DIASTOLIC BLOOD PRESSURE: 93 MMHG | TEMPERATURE: 97.9 F | HEART RATE: 84 BPM

## 2022-03-11 DIAGNOSIS — R07.82 INTERCOSTAL PAIN: ICD-10-CM

## 2022-03-11 LAB
ALBUMIN SERPL-MCNC: 3.7 G/DL (ref 3.4–5)
ALBUMIN UR-MCNC: 10 MG/DL
ALP SERPL-CCNC: 88 U/L (ref 40–150)
ALT SERPL W P-5'-P-CCNC: 120 U/L (ref 0–70)
ANION GAP SERPL CALCULATED.3IONS-SCNC: 6 MMOL/L (ref 3–14)
APPEARANCE UR: CLEAR
AST SERPL W P-5'-P-CCNC: 54 U/L (ref 0–45)
BASOPHILS # BLD AUTO: 0 10E3/UL (ref 0–0.2)
BASOPHILS NFR BLD AUTO: 1 %
BILIRUB SERPL-MCNC: 0.5 MG/DL (ref 0.2–1.3)
BILIRUB UR QL STRIP: NEGATIVE
BUN SERPL-MCNC: 13 MG/DL (ref 7–30)
CALCIUM SERPL-MCNC: 9.2 MG/DL (ref 8.5–10.1)
CHLORIDE BLD-SCNC: 100 MMOL/L (ref 94–109)
CO2 SERPL-SCNC: 26 MMOL/L (ref 20–32)
COLOR UR AUTO: ABNORMAL
CREAT SERPL-MCNC: 1.02 MG/DL (ref 0.66–1.25)
CRP SERPL-MCNC: 11.3 MG/L (ref 0–8)
EOSINOPHIL # BLD AUTO: 0.2 10E3/UL (ref 0–0.7)
EOSINOPHIL NFR BLD AUTO: 3 %
ERYTHROCYTE [DISTWIDTH] IN BLOOD BY AUTOMATED COUNT: 13 % (ref 10–15)
GFR SERPL CREATININE-BSD FRML MDRD: 90 ML/MIN/1.73M2
GLUCOSE BLD-MCNC: 404 MG/DL (ref 70–99)
GLUCOSE UR STRIP-MCNC: >1000 MG/DL
HCT VFR BLD AUTO: 45.7 % (ref 40–53)
HGB BLD-MCNC: 15.6 G/DL (ref 13.3–17.7)
HGB UR QL STRIP: NEGATIVE
IMM GRANULOCYTES # BLD: 0 10E3/UL
IMM GRANULOCYTES NFR BLD: 0 %
KETONES UR STRIP-MCNC: NEGATIVE MG/DL
LEUKOCYTE ESTERASE UR QL STRIP: NEGATIVE
LIPASE SERPL-CCNC: 224 U/L (ref 73–393)
LYMPHOCYTES # BLD AUTO: 1.2 10E3/UL (ref 0.8–5.3)
LYMPHOCYTES NFR BLD AUTO: 22 %
MCH RBC QN AUTO: 29.7 PG (ref 26.5–33)
MCHC RBC AUTO-ENTMCNC: 34.1 G/DL (ref 31.5–36.5)
MCV RBC AUTO: 87 FL (ref 78–100)
MONOCYTES # BLD AUTO: 0.6 10E3/UL (ref 0–1.3)
MONOCYTES NFR BLD AUTO: 10 %
MUCOUS THREADS #/AREA URNS LPF: PRESENT /LPF
NEUTROPHILS # BLD AUTO: 3.4 10E3/UL (ref 1.6–8.3)
NEUTROPHILS NFR BLD AUTO: 64 %
NITRATE UR QL: NEGATIVE
NRBC # BLD AUTO: 0 10E3/UL
NRBC BLD AUTO-RTO: 0 /100
PH UR STRIP: 5.5 [PH] (ref 4.7–8)
PLATELET # BLD AUTO: 277 10E3/UL (ref 150–450)
POTASSIUM BLD-SCNC: 3.7 MMOL/L (ref 3.4–5.3)
PROT SERPL-MCNC: 7.2 G/DL (ref 6.8–8.8)
RBC # BLD AUTO: 5.26 10E6/UL (ref 4.4–5.9)
RBC URINE: 0 /HPF
SODIUM SERPL-SCNC: 132 MMOL/L (ref 133–144)
SP GR UR STRIP: 1.04 (ref 1–1.03)
SQUAMOUS EPITHELIAL: 1 /HPF
UROBILINOGEN UR STRIP-MCNC: NORMAL MG/DL
WBC # BLD AUTO: 5.3 10E3/UL (ref 4–11)
WBC URINE: 1 /HPF

## 2022-03-11 PROCEDURE — 85025 COMPLETE CBC W/AUTO DIFF WBC: CPT | Performed by: NURSE PRACTITIONER

## 2022-03-11 PROCEDURE — 36415 COLL VENOUS BLD VENIPUNCTURE: CPT | Performed by: NURSE PRACTITIONER

## 2022-03-11 PROCEDURE — 81001 URINALYSIS AUTO W/SCOPE: CPT | Performed by: NURSE PRACTITIONER

## 2022-03-11 PROCEDURE — 99284 EMERGENCY DEPT VISIT MOD MDM: CPT | Mod: 25

## 2022-03-11 PROCEDURE — 99284 EMERGENCY DEPT VISIT MOD MDM: CPT | Performed by: NURSE PRACTITIONER

## 2022-03-11 PROCEDURE — 80053 COMPREHEN METABOLIC PANEL: CPT | Performed by: NURSE PRACTITIONER

## 2022-03-11 PROCEDURE — 86140 C-REACTIVE PROTEIN: CPT | Performed by: NURSE PRACTITIONER

## 2022-03-11 PROCEDURE — 71046 X-RAY EXAM CHEST 2 VIEWS: CPT

## 2022-03-11 PROCEDURE — 83690 ASSAY OF LIPASE: CPT | Performed by: NURSE PRACTITIONER

## 2022-03-11 NOTE — DISCHARGE INSTRUCTIONS
Thank you for choosing [facility] for your healthcare needs today.  For your intercostal pain, please use Tylenol or ibuprofen for discomfort, you may apply ice every 2-3 hours for no longer than 20 minutes.  If your symptoms do not improve in 2 weeks, please follow-up with your primary care provider.  If you develop worsening symptoms or any new concerning symptoms please return to ER.  Thank you

## 2022-03-11 NOTE — ED PROVIDER NOTES
History     Chief Complaint   Patient presents with     Back Pain     HPI   History of presenting illness given by patient.    Jason Duncan is a 52 year old male who presents with left lower rib pain only with cough or sneezing.  His pain started yesterday evening after he coughed really hard.  He does not have pain with ambulating or performing daily tasks only when he coughs or sneezes.  At the time he coughs, he rates his pain 6/10, currently he has 0 pain.  He only has pain if he coughs, sneezes,  or if he moves awkwardly.  He has not taken anything for pain.    Allergies:  No Known Allergies    Problem List:    Patient Active Problem List    Diagnosis Date Noted     Primary osteoarthritis of left knee 02/21/2020     Priority: Medium     Hyperlipidemia LDL goal <100 09/16/2019     Priority: Medium     Primary insomnia 09/16/2019     Priority: Medium     Chronic gout of multiple sites 09/16/2019     Priority: Medium     Type 2 diabetes mellitus without complication, without long-term current use of insulin (H) 07/10/2018     Priority: Medium     Vitamin D deficiency 04/17/2018     Priority: Medium     RACHELE (obstructive sleep apnea) 05/30/2017     Priority: Medium     Morbid obesity due to excess calories (H) 09/07/2016     Priority: Medium     Encounter for monitoring statin therapy 06/28/2016     Priority: Medium     Advance Care Planning 6/28/2016: ACP Review of Chart / Resources Provided:  Reviewed chart for advance care plan.  Jason Duncan has no plan or code status on file. Discussed available resources and provided with information. Confirmed code status reflects current choices pending further ACP discussions.  Confirmed/documented legally designated decision makers.  Added by Phyllis Olvera             Gastric band slippage 09/01/2015     Priority: Medium     Mixed hearing loss, unilateral 11/20/2013     Priority: Medium     Status post bariatric surgery 09/25/2008     Priority: Medium     Benign  essential hypertension 06/09/2008     Priority: Medium        Past Medical History:    Past Medical History:   Diagnosis Date     Allergic rhinitis      Benign essential hypertension 6/9/2008     Chronic gout of multiple sites 9/16/2019     Cramp of limb 4/17/2018     Hyperlipidemia LDL goal <100 9/16/2019     RACHELE (obstructive sleep apnea)      Primary insomnia 9/16/2019     Primary osteoarthritis of left knee 2/21/2020     Type 2 diabetes mellitus without complication, without long-term current use of insulin (H) 7/10/2018     Vitamin D deficiency 4/17/2018       Past Surgical History:    Past Surgical History:   Procedure Laterality Date     ARTHROSCOPY KNEE Left 3/30/2021    Procedure: LEFT KNEE ARTHROSCOPY:PARTIAL MEDIAL MENISECTOMY, PARTIAL LATERAL MENISECTOMY;  Surgeon: Donny Au MD;  Location: [facility]     GASTRIC RESTRICTIVE PROCEDURE, OPEN, REMOVE/REPLACE SUBCUTANEOUS PORT  2008     GI SURGERY  2015    removal of lap band      lap band  2008     nasal septoplasty       SEPTOPLASTY      Nasal       Family History:    Family History   Problem Relation Age of Onset     Dementia Mother 72        Cause of death     Diabetes Father      Respiratory Father         COPD     Cardiovascular Father         CHF       Social History:  Marital Status:   [2]  Social History     Tobacco Use     Smoking status: Never Smoker     Smokeless tobacco: Never Used   Substance Use Topics     Alcohol use: Yes     Comment: rarely     Drug use: No        Medications:    amLODIPine (NORVASC) 10 MG tablet  aspirin 81 MG tablet  cetirizine (ZYRTEC) 10 MG tablet  cyclobenzaprine (FLEXERIL) 10 MG tablet  JANUVIA 100 MG tablet  lisinopril (ZESTRIL) 10 MG tablet  loratadine (CLARITIN) 10 MG tablet  metFORMIN (GLUCOPHAGE-XR) 500 MG 24 hr tablet  methocarbamol (ROBAXIN) 750 MG tablet  montelukast (SINGULAIR) 10 MG tablet  Multiple Vitamins-Minerals (ZINC PO)  multivitamin, therapeutic with minerals (MULTI-VITAMIN) TABS  order for  DME  simvastatin (ZOCOR) 10 MG tablet  traZODone (DESYREL) 50 MG tablet  VITAMIN D, CHOLECALCIFEROL, PO          Review of Systems   Constitutional: Negative.    HENT: Negative.    Eyes: Negative.    Respiratory: Negative.    Cardiovascular: Negative.         Left lower quadrant rib pain   Gastrointestinal: Negative.    Endocrine: Negative.    Genitourinary: Negative.    Musculoskeletal: Negative.    Skin: Negative.    Allergic/Immunologic: Negative.    Neurological: Negative.    Hematological: Negative.    Psychiatric/Behavioral: Negative.        Physical Exam   BP: (!) 181/120 (states ran out of BP pills but was able to restart them.)  Pulse: 85  Temp: 97.5  F (36.4  C)  Resp: 16  SpO2: 97 %      Physical Exam  Vitals and nursing note reviewed.   Constitutional:       Appearance: Normal appearance. He is normal weight.   HENT:      Head: Normocephalic and atraumatic.      Nose: Nose normal.      Mouth/Throat:      Mouth: Mucous membranes are moist.      Pharynx: Oropharynx is clear.   Eyes:      Extraocular Movements: Extraocular movements intact.      Conjunctiva/sclera: Conjunctivae normal.      Pupils: Pupils are equal, round, and reactive to light.   Cardiovascular:      Rate and Rhythm: Normal rate and regular rhythm.      Pulses: Normal pulses.      Heart sounds: Normal heart sounds.   Pulmonary:      Effort: Pulmonary effort is normal.      Breath sounds: Normal breath sounds.   Abdominal:      General: Abdomen is flat. Bowel sounds are normal.      Palpations: Abdomen is soft.   Musculoskeletal:         General: Normal range of motion.      Cervical back: Normal range of motion and neck supple.   Skin:     General: Skin is warm and dry.   Neurological:      General: No focal deficit present.      Mental Status: He is alert and oriented to person, place, and time.   Psychiatric:         Mood and Affect: Mood normal.         Behavior: Behavior normal.         Thought Content: Thought content normal.          "Judgment: Judgment normal.         ED Course              ED Course as of 03/14/22 0030   Fri Mar 11, 2022   1407 PROCEDURE:  XR CHEST 2 VW     FINDINGS:   The cardiac silhouette is normal in size. The pulmonary vasculature is  normal.  The lungs are clear. No pleural effusion or pneumothorax.                                                                      IMPRESSION:  No acute cardiopulmonary disease.    1409 Upon reviewing patient's blood pressures with him, he tells me he has been out of all of his medications including his diabetic medication and blood pressure medication for 5 days so he has not taken anything.  He did get a refill of all his medications today and will be starting them today.              No results found for this or any previous visit (from the past 24 hour(s)).    Medications - No data to display    Assessments & Plan (with Medical Decision Making)   Findings as above.  52-year-old male presents with left lower quadrant rib pain only present with coughing, sneezing, or \"awkwardly\" movements.  On assessment he does not have pain with firm palpation to any of the chest region or back region.  Lungs are clear throughout bilaterally.  Abdomen is negative of pain with palpation, normal bowel sounds.     Chest x-ray 2 views: No acute cardiopulmonary disease.      I discussed with patient lab and imaging results all negative for abnormal findings.  We discussed the discharge plan and treatment for intercostal pain as his pain is only present if he coughs or sneezes.  Patient will use NSAIDs as needed and he will ice the area every 2-3 hours for no longer than 20 minutes.  Patient instructed if he develops worsening symptoms or new concerning symptoms to return to ER.    I have reviewed the nursing notes.    I have reviewed the findings, diagnosis, plan and need for follow up with the patient.      Discharge Medication List as of 3/11/2022  2:20 PM          Final diagnoses:   Intercostal pain "       3/11/2022   [facility]     Anabelle Rainey APRN CNP  03/14/22 0031

## 2022-03-11 NOTE — ED TRIAGE NOTES
Pt states he coughed last night and got sudden left flank pain. States area hurts when moving or coughing. States no fevers or body aches. Denies any change in urination.

## 2022-03-14 ASSESSMENT — ENCOUNTER SYMPTOMS
RESPIRATORY NEGATIVE: 1
ENDOCRINE NEGATIVE: 1
GASTROINTESTINAL NEGATIVE: 1
PSYCHIATRIC NEGATIVE: 1
CONSTITUTIONAL NEGATIVE: 1
MUSCULOSKELETAL NEGATIVE: 1
EYES NEGATIVE: 1
CARDIOVASCULAR NEGATIVE: 1
HEMATOLOGIC/LYMPHATIC NEGATIVE: 1
NEUROLOGICAL NEGATIVE: 1
ALLERGIC/IMMUNOLOGIC NEGATIVE: 1

## 2022-04-25 DIAGNOSIS — J30.2 CHRONIC SEASONAL ALLERGIC RHINITIS: ICD-10-CM

## 2022-04-27 RX ORDER — CETIRIZINE HYDROCHLORIDE 10 MG/1
TABLET ORAL
Qty: 90 TABLET | Refills: 0 | Status: SHIPPED | OUTPATIENT
Start: 2022-04-27 | End: 2022-08-23

## 2022-05-04 NOTE — PROGRESS NOTES
Assessment & Plan        Benign essential hypertension  - UA with Microscopic reflex to Culture - [facility]  - Urine Microscopic  - amLODIPine (NORVASC) 10 MG tablet; Take 1 tablet (10 mg) by mouth daily  - lisinopril (ZESTRIL) 10 MG tablet; TAKE 1 AND 1/2 TABLETS (15MG) BY MOUTH DAILY    Type 2 diabetes mellitus without complication, without long-term current use of insulin (H)  - Hemoglobin A1c  - Albumin Random Urine Quantitative with Creat Ratio  - sitagliptin (JANUVIA) 100 MG tablet; Take 1 tablet (100 mg) by mouth daily  - metFORMIN (GLUCOPHAGE XR) 500 MG 24 hr tablet; TAKE 2 TABLETS BY MOUTH 2 TIMES DAILY WITH MEALS    Hyperlipidemia LDL goal <100  - Comprehensive metabolic panel  - Lipid Profile (Chol, Trig, HDL, LDL calc)  - TSH with free T4 reflex  - simvastatin (ZOCOR) 10 MG tablet; TAKE 1 TABLET BY MOUTH DAILY AT BEDTIME    Special screening for malignant neoplasms, colon  - COLOGUARD([employer])    RACHELE (obstructive sleep apnea)  - Miscellaneous Order for DME - ONLY FOR DME    Screening for prostate cancer  - PSA, screen; Future    Pain of right shoulder  - cyclobenzaprine (FLEXERIL) 10 MG tablet; TAKE 1 TABLET BY MOUTH NIGHTLY AS NEEDED FOR MUSCLE SPASMS    Arthritis  - methocarbamol (ROBAXIN) 750 MG tablet; TAKE 1 TABLET BY MOUTH 3 TIMES DAILY AS NEEDED FOR MUSCLE SPASMS    Chronic seasonal allergic rhinitis  - montelukast (SINGULAIR) 10 MG tablet; TAKE 1 TABLET BY MOUTH DAILY AT BEDTIME    Return in about 6 months (around 11/6/2022).      Samira Whitney, LAILA  [facility]        Juan is a 52 year old who presents for the following health issues         Diabetes Follow-up    How often are you checking your blood sugar? Not at all    What concerns do you have today about your diabetes? None     Do you have any of these symptoms? (Select all that apply)  No numbness or tingling in feet.  No redness, sores or blisters on feet.  No complaints of excessive thirst.  No reports of  blurry vision.  No significant changes to weight.      Hyperlipidemia Follow-Up    Are you regularly taking any medication or supplement to lower your cholesterol?   Yes- Zocor    Are you having muscle aches or other side effects that you think could be caused by your cholesterol lowering medication?  No      Hypertension Follow-up    Do you check your blood pressure regularly outside of the clinic? No     Are you following a low salt diet? Yes    Are your blood pressures ever more than 140 on the top number (systolic) OR more   than 90 on the bottom number (diastolic), for example 140/90? Yes       RACHELE  On Cpap  Stable, doing well  Needs supplies  Length of need - lifetime  Face to face visit - today      BP Readings from Last 2 Encounters:   05/06/22 130/80   03/11/22 161/93     Hemoglobin A1C POCT (%)   Date Value   03/22/2021 6.5 (H)   01/14/2021 8.1 (H)     LDL Cholesterol Calculated (mg/dL)   Date Value   01/14/2021 91   07/13/2020 89           Patient Active Problem List   Diagnosis     Benign essential hypertension     Mixed hearing loss, unilateral     Encounter for monitoring statin therapy     Morbid obesity due to excess calories (H)     RACHELE (obstructive sleep apnea)     Vitamin D deficiency     Type 2 diabetes mellitus without complication, without long-term current use of insulin (H)     Gastric band slippage     Status post bariatric surgery     Hyperlipidemia LDL goal <100     Primary insomnia     Chronic gout of multiple sites     Primary osteoarthritis of left knee     Past Surgical History:   Procedure Laterality Date     ARTHROSCOPY KNEE Left 3/30/2021    Procedure: LEFT KNEE ARTHROSCOPY:PARTIAL MEDIAL MENISECTOMY, PARTIAL LATERAL MENISECTOMY;  Surgeon: Donny Au MD;  Location: [facility]     GASTRIC RESTRICTIVE PROCEDURE, OPEN, REMOVE/REPLACE SUBCUTANEOUS PORT  2008     GI SURGERY  2015    removal of lap band      lap band  2008     nasal septoplasty       SEPTOPLASTY      Nasal       Social  History     Tobacco Use     Smoking status: Never Smoker     Smokeless tobacco: Never Used   Substance Use Topics     Alcohol use: Yes     Comment: rarely     Family History   Problem Relation Age of Onset     Dementia Mother 72        Cause of death     Diabetes Father      Respiratory Father         COPD     Cardiovascular Father         CHF           Current Outpatient Medications   Medication Sig Dispense Refill     amLODIPine (NORVASC) 10 MG tablet Take 1 tablet (10 mg) by mouth daily 90 tablet 1     aspirin 81 MG tablet Take by mouth daily       cetirizine (ZYRTEC) 10 MG tablet TAKE 1 TABLET BY MOUTH EVERY EVENING 90 tablet 0     cyclobenzaprine (FLEXERIL) 10 MG tablet TAKE 1 TABLET BY MOUTH NIGHTLY AS NEEDED FOR MUSCLE SPASMS 90 tablet 3     lisinopril (ZESTRIL) 10 MG tablet TAKE 1 AND 1/2 TABLETS (15MG) BY MOUTH DAILY 135 tablet 1     loratadine (CLARITIN) 10 MG tablet TAKE 1 TABLET BY MOUTH DAILY 90 tablet 1     metFORMIN (GLUCOPHAGE XR) 500 MG 24 hr tablet TAKE 2 TABLETS BY MOUTH 2 TIMES DAILY WITH MEALS 360 tablet 1     methocarbamol (ROBAXIN) 750 MG tablet TAKE 1 TABLET BY MOUTH 3 TIMES DAILY AS NEEDED FOR MUSCLE SPASMS 360 tablet 1     montelukast (SINGULAIR) 10 MG tablet TAKE 1 TABLET BY MOUTH DAILY AT BEDTIME 90 tablet 1     Multiple Vitamins-Minerals (ZINC PO)        multivitamin w/minerals (THERA-VIT-M) tablet Take 1 tablet by mouth daily       order for DME c-pap mask and supplies    DX: G47.33, sleep apnea      RACHELE - uses CPAP  Stable, does well with CPAP  Is due for annual supply renewal  Length of need:  Lifetime  Face to face visit - 2/21/2020 1 Device 11     simvastatin (ZOCOR) 10 MG tablet TAKE 1 TABLET BY MOUTH DAILY AT BEDTIME 90 tablet 1     sitagliptin (JANUVIA) 100 MG tablet Take 1 tablet (100 mg) by mouth daily 90 tablet 1     VITAMIN D, CHOLECALCIFEROL, PO Take 5,000 Units by mouth daily          No Known Allergies       Recent Labs   Lab Test 03/11/22  1234 03/22/21  1410  01/14/21  0910 07/13/20  1435 07/03/20  1126 10/31/19  0931   A1C  --  6.5* 8.1* 6.3*  --  6.5*   LDL  --   --  91 89  --  92   HDL  --   --  40 39*  --  39*   TRIG  --   --  148 219*  --  182*   * 157* 134* 100*  --   --    CR 1.02 1.04 0.92 0.93   < >  --    GFRESTIMATED 90 84 >90 >90   < >  --    GFRESTBLACK  --  >90 >90 >90   < >  --    POTASSIUM 3.7 3.8 4.0 3.6   < >  --    TSH  --   --  1.52 1.07  --  1.10    < > = values in this interval not displayed.          BP Readings from Last 3 Encounters:   05/06/22 130/80   03/11/22 161/93   03/30/21 143/90    Wt Readings from Last 3 Encounters:   05/06/22 (!) 153.3 kg (338 lb)   03/30/21 (!) 157.7 kg (347 lb 9.6 oz)   03/22/21 (!) 158.8 kg (350 lb)                Review of Systems   Constitutional, HEENT, cardiovascular, pulmonary, GI, , musculoskeletal, neuro, skin, endocrine and psych systems are negative, except as otherwise noted.        Objective    /80 (BP Location: Left arm, Patient Position: Chair, Cuff Size: Adult Large)   Pulse 85   Temp 96.9  F (36.1  C) (Tympanic)   Wt (!) 153.3 kg (338 lb)   SpO2 96%   BMI 43.40 kg/m    Body mass index is 43.4 kg/m .       Physical Exam   GENERAL: healthy, alert and no distress  EYES: Eyes grossly normal to inspection, PERRL and conjunctivae and sclerae normal  HENT: ear canals and TM's normal, nose and mouth without ulcers or lesions  NECK: no adenopathy, no asymmetry, masses, or scars and thyroid normal to palpation  RESP: lungs clear to auscultation - no rales, rhonchi or wheezes  CV: regular rate and rhythm, normal S1 S2, no S3 or S4, no murmur, click or rub, no peripheral edema and peripheral pulses strong  ABDOMEN: soft, nontender, no hepatosplenomegaly, no masses and bowel sounds normal  MS: no gross musculoskeletal defects noted, no edema  SKIN: no suspicious lesions or rashes  PSYCH: mentation appears normal, affect normal/bright  Diabetic foot exam: normal DP and PT pulses, no trophic  changes or ulcerative lesions and normal sensory exam

## 2022-05-05 DIAGNOSIS — F51.01 PRIMARY INSOMNIA: ICD-10-CM

## 2022-05-05 RX ORDER — TRAZODONE HYDROCHLORIDE 50 MG/1
TABLET, FILM COATED ORAL
Qty: 60 TABLET | Refills: 3 | Status: SHIPPED | OUTPATIENT
Start: 2022-05-05 | End: 2022-05-06

## 2022-05-05 NOTE — TELEPHONE ENCOUNTER
Trazodone       Last Written Prescription Date:  1/10/22  Last Fill Quantity: 60,   # refills: 0  Last Office Visit: 3-22-21  Future Office visit:    Next 5 appointments (look out 90 days)    May 06, 2022  8:15 AM  (Arrive by 8:00 AM)  SHORT with Samira Whitney CNP  [facility] ([facility] ) [address]  [facility] [address]  [phone]

## 2022-05-06 ENCOUNTER — OFFICE VISIT (OUTPATIENT)
Dept: FAMILY MEDICINE | Facility: OTHER | Age: 52
End: 2022-05-06
Attending: NURSE PRACTITIONER
Payer: COMMERCIAL

## 2022-05-06 VITALS
OXYGEN SATURATION: 96 % | WEIGHT: 315 LBS | DIASTOLIC BLOOD PRESSURE: 80 MMHG | SYSTOLIC BLOOD PRESSURE: 130 MMHG | HEART RATE: 85 BPM | BODY MASS INDEX: 43.4 KG/M2 | TEMPERATURE: 96.9 F

## 2022-05-06 DIAGNOSIS — Z12.11 SPECIAL SCREENING FOR MALIGNANT NEOPLASMS, COLON: ICD-10-CM

## 2022-05-06 DIAGNOSIS — E78.5 HYPERLIPIDEMIA LDL GOAL <100: ICD-10-CM

## 2022-05-06 DIAGNOSIS — J30.2 CHRONIC SEASONAL ALLERGIC RHINITIS: ICD-10-CM

## 2022-05-06 DIAGNOSIS — Z12.5 SCREENING FOR PROSTATE CANCER: ICD-10-CM

## 2022-05-06 DIAGNOSIS — E11.9 TYPE 2 DIABETES MELLITUS WITHOUT COMPLICATION, WITHOUT LONG-TERM CURRENT USE OF INSULIN (H): ICD-10-CM

## 2022-05-06 DIAGNOSIS — M19.90 ARTHRITIS: ICD-10-CM

## 2022-05-06 DIAGNOSIS — I10 BENIGN ESSENTIAL HYPERTENSION: Primary | ICD-10-CM

## 2022-05-06 DIAGNOSIS — M25.511 ACUTE PAIN OF RIGHT SHOULDER: ICD-10-CM

## 2022-05-06 DIAGNOSIS — G47.33 OSA (OBSTRUCTIVE SLEEP APNEA): ICD-10-CM

## 2022-05-06 LAB
ALBUMIN SERPL-MCNC: 3.6 G/DL (ref 3.4–5)
ALBUMIN UR-MCNC: 30 MG/DL
ALP SERPL-CCNC: 89 U/L (ref 40–150)
ALT SERPL W P-5'-P-CCNC: 139 U/L (ref 0–70)
ANION GAP SERPL CALCULATED.3IONS-SCNC: 9 MMOL/L (ref 3–14)
APPEARANCE UR: CLEAR
AST SERPL W P-5'-P-CCNC: 73 U/L (ref 0–45)
BACTERIA #/AREA URNS HPF: ABNORMAL /HPF
BILIRUB SERPL-MCNC: 0.9 MG/DL (ref 0.2–1.3)
BILIRUB UR QL STRIP: NEGATIVE
BUN SERPL-MCNC: 14 MG/DL (ref 7–30)
CALCIUM SERPL-MCNC: 9.1 MG/DL (ref 8.5–10.1)
CHLORIDE BLD-SCNC: 101 MMOL/L (ref 94–109)
CHOLEST SERPL-MCNC: 200 MG/DL
CO2 SERPL-SCNC: 23 MMOL/L (ref 20–32)
COLOR UR AUTO: YELLOW
CREAT SERPL-MCNC: 0.88 MG/DL (ref 0.66–1.25)
CREAT UR-MCNC: 103 MG/DL
EST. AVERAGE GLUCOSE BLD GHB EST-MCNC: 252 MG/DL
FASTING STATUS PATIENT QL REPORTED: YES
GFR SERPL CREATININE-BSD FRML MDRD: >90 ML/MIN/1.73M2
GLUCOSE BLD-MCNC: 352 MG/DL (ref 70–99)
GLUCOSE UR STRIP-MCNC: >=1000 MG/DL
HBA1C MFR BLD: 10.4 % (ref 0–5.6)
HDLC SERPL-MCNC: 29 MG/DL
HGB UR QL STRIP: ABNORMAL
HOLD SPECIMEN: NORMAL
KETONES UR STRIP-MCNC: NEGATIVE MG/DL
LDLC SERPL CALC-MCNC: 113 MG/DL
LEUKOCYTE ESTERASE UR QL STRIP: NEGATIVE
MICROALBUMIN UR-MCNC: 252 MG/L
MICROALBUMIN/CREAT UR: 244.66 MG/G CR (ref 0–17)
NITRATE UR QL: NEGATIVE
NONHDLC SERPL-MCNC: 171 MG/DL
PH UR STRIP: 6 [PH] (ref 5–7)
POTASSIUM BLD-SCNC: 4 MMOL/L (ref 3.4–5.3)
PROT SERPL-MCNC: 7.6 G/DL (ref 6.8–8.8)
PSA SERPL-MCNC: 0.52 UG/L (ref 0–4)
RBC #/AREA URNS AUTO: ABNORMAL /HPF
SODIUM SERPL-SCNC: 133 MMOL/L (ref 133–144)
SP GR UR STRIP: 1.02 (ref 1–1.03)
SQUAMOUS #/AREA URNS AUTO: ABNORMAL /LPF
TRIGL SERPL-MCNC: 290 MG/DL
TSH SERPL DL<=0.005 MIU/L-ACNC: 1.2 MU/L (ref 0.4–4)
UROBILINOGEN UR STRIP-ACNC: 0.2 E.U./DL
WBC #/AREA URNS AUTO: ABNORMAL /HPF

## 2022-05-06 PROCEDURE — 99214 OFFICE O/P EST MOD 30 MIN: CPT | Performed by: NURSE PRACTITIONER

## 2022-05-06 PROCEDURE — 84443 ASSAY THYROID STIM HORMONE: CPT | Performed by: NURSE PRACTITIONER

## 2022-05-06 PROCEDURE — 80061 LIPID PANEL: CPT | Performed by: NURSE PRACTITIONER

## 2022-05-06 PROCEDURE — 81001 URINALYSIS AUTO W/SCOPE: CPT | Performed by: NURSE PRACTITIONER

## 2022-05-06 PROCEDURE — 80053 COMPREHEN METABOLIC PANEL: CPT | Performed by: NURSE PRACTITIONER

## 2022-05-06 PROCEDURE — G0103 PSA SCREENING: HCPCS | Performed by: NURSE PRACTITIONER

## 2022-05-06 PROCEDURE — 82043 UR ALBUMIN QUANTITATIVE: CPT | Performed by: NURSE PRACTITIONER

## 2022-05-06 PROCEDURE — 36415 COLL VENOUS BLD VENIPUNCTURE: CPT | Performed by: NURSE PRACTITIONER

## 2022-05-06 PROCEDURE — 83036 HEMOGLOBIN GLYCOSYLATED A1C: CPT | Performed by: NURSE PRACTITIONER

## 2022-05-06 RX ORDER — SIMVASTATIN 40 MG
TABLET ORAL
Qty: 90 TABLET | Refills: 1 | Status: SHIPPED | OUTPATIENT
Start: 2022-05-06 | End: 2022-10-07

## 2022-05-06 RX ORDER — LISINOPRIL 10 MG/1
TABLET ORAL
Qty: 135 TABLET | Refills: 1 | Status: SHIPPED | OUTPATIENT
Start: 2022-05-06 | End: 2022-10-07

## 2022-05-06 RX ORDER — CYCLOBENZAPRINE HCL 10 MG
TABLET ORAL
Qty: 90 TABLET | Refills: 3 | Status: SHIPPED | OUTPATIENT
Start: 2022-05-06 | End: 2022-10-07

## 2022-05-06 RX ORDER — MONTELUKAST SODIUM 10 MG/1
TABLET ORAL
Qty: 90 TABLET | Refills: 1 | Status: SHIPPED | OUTPATIENT
Start: 2022-05-06 | End: 2022-10-07

## 2022-05-06 RX ORDER — AMLODIPINE BESYLATE 10 MG/1
10 TABLET ORAL DAILY
Qty: 90 TABLET | Refills: 1 | Status: SHIPPED | OUTPATIENT
Start: 2022-05-06 | End: 2022-10-07

## 2022-05-06 RX ORDER — SIMVASTATIN 10 MG
TABLET ORAL
Qty: 90 TABLET | Refills: 1 | Status: SHIPPED | OUTPATIENT
Start: 2022-05-06 | End: 2022-05-06

## 2022-05-06 RX ORDER — METHOCARBAMOL 750 MG/1
TABLET, FILM COATED ORAL
Qty: 360 TABLET | Refills: 1 | Status: SHIPPED | OUTPATIENT
Start: 2022-05-06 | End: 2023-02-27

## 2022-05-06 RX ORDER — METFORMIN HCL 500 MG
TABLET, EXTENDED RELEASE 24 HR ORAL
Qty: 360 TABLET | Refills: 1 | Status: SHIPPED | OUTPATIENT
Start: 2022-05-06 | End: 2022-10-07

## 2022-05-06 ASSESSMENT — ANXIETY QUESTIONNAIRES
5. BEING SO RESTLESS THAT IT IS HARD TO SIT STILL: NOT AT ALL
4. TROUBLE RELAXING: NOT AT ALL
3. WORRYING TOO MUCH ABOUT DIFFERENT THINGS: NOT AT ALL
1. FEELING NERVOUS, ANXIOUS, OR ON EDGE: NOT AT ALL
6. BECOMING EASILY ANNOYED OR IRRITABLE: NOT AT ALL
GAD7 TOTAL SCORE: 0
7. FEELING AFRAID AS IF SOMETHING AWFUL MIGHT HAPPEN: NOT AT ALL
2. NOT BEING ABLE TO STOP OR CONTROL WORRYING: NOT AT ALL

## 2022-05-06 ASSESSMENT — PAIN SCALES - GENERAL: PAINLEVEL: NO PAIN (0)

## 2022-05-06 ASSESSMENT — PATIENT HEALTH QUESTIONNAIRE - PHQ9: SUM OF ALL RESPONSES TO PHQ QUESTIONS 1-9: 0

## 2022-05-06 NOTE — NURSING NOTE
"Chief Complaint   Patient presents with     Hypertension     Diabetes     Lipids       Initial /86 (BP Location: Left arm, Patient Position: Chair, Cuff Size: Adult Large)   Pulse 85   Temp 96.9  F (36.1  C) (Tympanic)   Wt (!) 153.3 kg (338 lb)   SpO2 96%   BMI 43.40 kg/m   Estimated body mass index is 43.4 kg/m  as calculated from the following:    Height as of 3/30/21: 1.88 m (6' 2\").    Weight as of this encounter: 153.3 kg (338 lb).  Medication Reconciliation: complete  NELSON STEVENSON LPN  "

## 2022-05-06 NOTE — PATIENT INSTRUCTIONS
Assessment & Plan        Benign essential hypertension  - UA with Microscopic reflex to Culture - [place] [facility]  - Urine Microscopic  - amLODIPine (NORVASC) 10 MG tablet; Take 1 tablet (10 mg) by mouth daily  - lisinopril (ZESTRIL) 10 MG tablet; TAKE 1 AND 1/2 TABLETS (15MG) BY MOUTH DAILY    Type 2 diabetes mellitus without complication, without long-term current use of insulin (H)  - Hemoglobin A1c  - Albumin Random Urine Quantitative with Creat Ratio  - sitagliptin (JANUVIA) 100 MG tablet; Take 1 tablet (100 mg) by mouth daily  - metFORMIN (GLUCOPHAGE XR) 500 MG 24 hr tablet; TAKE 2 TABLETS BY MOUTH 2 TIMES DAILY WITH MEALS    Hyperlipidemia LDL goal <100  - Comprehensive metabolic panel  - Lipid Profile (Chol, Trig, HDL, LDL calc)  - TSH with free T4 reflex  - simvastatin (ZOCOR) 10 MG tablet; TAKE 1 TABLET BY MOUTH DAILY AT BEDTIME    Special screening for malignant neoplasms, colon  - COLOGUARD([employer])    RACHELE (obstructive sleep apnea)  - Miscellaneous Order for DME - ONLY FOR DME    Screening for prostate cancer  - PSA, screen; Future    Pain of right shoulder  - cyclobenzaprine (FLEXERIL) 10 MG tablet; TAKE 1 TABLET BY MOUTH NIGHTLY AS NEEDED FOR MUSCLE SPASMS    Arthritis  - methocarbamol (ROBAXIN) 750 MG tablet; TAKE 1 TABLET BY MOUTH 3 TIMES DAILY AS NEEDED FOR MUSCLE SPASMS    Chronic seasonal allergic rhinitis  - montelukast (SINGULAIR) 10 MG tablet; TAKE 1 TABLET BY MOUTH DAILY AT BEDTIME      Return in about 6 months (around 11/6/2022).      Samira Whitney CNP  [facility] [place] [facility]

## 2022-05-07 ENCOUNTER — HEALTH MAINTENANCE LETTER (OUTPATIENT)
Age: 52
End: 2022-05-07

## 2022-05-07 ASSESSMENT — ANXIETY QUESTIONNAIRES: GAD7 TOTAL SCORE: 0

## 2022-05-11 DIAGNOSIS — E11.9 TYPE 2 DIABETES MELLITUS WITHOUT COMPLICATION, WITHOUT LONG-TERM CURRENT USE OF INSULIN (H): Primary | ICD-10-CM

## 2022-05-25 ENCOUNTER — HOSPITAL ENCOUNTER (OUTPATIENT)
Dept: EDUCATION SERVICES | Facility: HOSPITAL | Age: 52
Discharge: HOME | End: 2022-05-25
Attending: NURSE PRACTITIONER | Admitting: NURSE PRACTITIONER
Payer: COMMERCIAL

## 2022-05-25 VITALS
BODY MASS INDEX: 40.43 KG/M2 | HEIGHT: 74 IN | DIASTOLIC BLOOD PRESSURE: 76 MMHG | RESPIRATION RATE: 16 BRPM | SYSTOLIC BLOOD PRESSURE: 132 MMHG | WEIGHT: 315 LBS | HEART RATE: 96 BPM | OXYGEN SATURATION: 98 %

## 2022-05-25 DIAGNOSIS — E11.65 TYPE 2 DIABETES MELLITUS WITH HYPERGLYCEMIA, WITHOUT LONG-TERM CURRENT USE OF INSULIN (H): Primary | ICD-10-CM

## 2022-05-25 DIAGNOSIS — E11.9 TYPE 2 DIABETES MELLITUS WITHOUT COMPLICATION, WITHOUT LONG-TERM CURRENT USE OF INSULIN (H): Primary | ICD-10-CM

## 2022-05-25 PROCEDURE — G0108 DIAB MANAGE TRN  PER INDIV: HCPCS | Performed by: DIETITIAN, REGISTERED

## 2022-05-25 RX ORDER — SEMAGLUTIDE 1.34 MG/ML
INJECTION, SOLUTION SUBCUTANEOUS
Qty: 1.5 ML | Refills: 1 | Status: SHIPPED | OUTPATIENT
Start: 2022-05-25 | End: 2022-10-07

## 2022-05-25 ASSESSMENT — PAIN SCALES - GENERAL: PAINLEVEL: NO PAIN (0)

## 2022-05-25 NOTE — PROGRESS NOTES
"Diabetes Self-Management Education & Support    Presents for: Individual review    SUBJECTIVE/OBJECTIVE:  Presents for: Individual review  Accompanied by: Self  Diabetes education in the past 24mo: No  Focus of Visit: Monitoring, Taking Medication, Healthy Eating, Assistance w/ making life changes  Diabetes type: Type 2  Date of diagnosis: 4/2018  Disease course: Getting harder to manage  How confident are you filling out medical forms by yourself:: Extremely  Diabetes management related comments/concerns: No previous education - wants to learn.  Transportation concerns: No  Difficulty affording diabetes medication?: No  Other concerns:: None  Cultural Influences/Ethnic Background:  Not  or     Diabetes Symptoms & Complications:  Fatigue: No  Neuropathy: No  Polydipsia: No  Polyphagia: No  Polyuria: No  Visual change: No  Slow healing wounds: No  Symptom course: Stable  Weight trend: Stable  Complications assessed today?: Yes  CVA: No  Heart disease: No  Nephropathy: No  Retinopathy: No    Patient Problem List and Family Medical History reviewed for relevant medical history, current medical status, and diabetes risk factors.    Vitals:  /76   Pulse 96   Resp 16   Ht 1.88 m (6' 2\")   Wt (!) 153.8 kg (339 lb)   SpO2 98%   BMI 43.53 kg/m    Estimated body mass index is 43.53 kg/m  as calculated from the following:    Height as of this encounter: 1.88 m (6' 2\").    Weight as of this encounter: 153.8 kg (339 lb).   Last 3 BP:   BP Readings from Last 3 Encounters:   05/25/22 132/76   05/06/22 130/80   03/11/22 161/93       History   Smoking Status     Never Smoker   Smokeless Tobacco     Never Used       Labs:  Lab Results   Component Value Date    A1C 10.4 05/06/2022    A1C 6.5 03/22/2021     Lab Results   Component Value Date     05/06/2022     03/22/2021     Lab Results   Component Value Date     05/06/2022    LDL 91 01/14/2021     HDL Cholesterol   Date Value Ref Range " Status   01/14/2021 40 >39 mg/dL Final     Direct Measure HDL   Date Value Ref Range Status   05/06/2022 29 (L) >=40 mg/dL Final   ]  GFR Estimate   Date Value Ref Range Status   05/06/2022 >90 >60 mL/min/1.73m2 Final     Comment:     Effective December 21, 2021 eGFRcr in adults is calculated using the 2021 CKD-EPI creatinine equation which includes age and gender (Berta et al., NEJ, DOI: 10.1056/HCTSdx9675525)   03/22/2021 84 >60 mL/min/[1.73_m2] Final     Comment:     Non  GFR Calc  Starting 12/18/2018, serum creatinine based estimated GFR (eGFR) will be   calculated using the Chronic Kidney Disease Epidemiology Collaboration   (CKD-EPI) equation.       GFR Estimate If Black   Date Value Ref Range Status   03/22/2021 >90 >60 mL/min/[1.73_m2] Final     Comment:      GFR Calc  Starting 12/18/2018, serum creatinine based estimated GFR (eGFR) will be   calculated using the Chronic Kidney Disease Epidemiology Collaboration   (CKD-EPI) equation.       Lab Results   Component Value Date    CR 0.88 05/06/2022    CR 1.04 03/22/2021     No results found for: MICROALBUMIN    Healthy Eating:  Healthy Eating Assessed Today: Yes  Cultural/Pent[employer]alism diet restrictions?: No  Meal planning/habits: None  Meals include: Breakfast, Lunch, Dinner  Breakfast: None  Lunch: 2 sandwiches, chips, fruit cup or fresh fruit, Reeses Peanut Butter cup (2 pack)  -  these items are spread throughout the day while he is working  Dinner: meat, potatoes, veggies - eating more salad with supper recently  Snacks: 1 package cheese/crackers, fruit  Beverages: Water, Juice, Soda (3 bottles apple juice, 1 coke every other day)  Has patient met with a dietitian in the past?: No    Being Active:  Being Active Assessed Today: Yes  Exercise:: Yes (Job is active)  Barrier to exercise: Time    Monitoring:  Monitoring Assessed Today: Yes  Did patient bring glucose meter to appointment? : No (Has old one - never used it)    Taking  Medications:  Diabetes Medication(s)     Biguanides       metFORMIN (GLUCOPHAGE XR) 500 MG 24 hr tablet    TAKE 2 TABLETS BY MOUTH 2 TIMES DAILY WITH MEALS    Dipeptidyl Peptidase-4 (DPP-4) Inhibitors       sitagliptin (JANUVIA) 100 MG tablet    Take 1 tablet (100 mg) by mouth daily        Taking Medication Assessed Today: Yes  Current Treatments: Oral Medication (taken by mouth) (Januvia 100 mg daily, Metformin XR 1000 mg bid)  Problems taking diabetes medications regularly?: No (Pt states prior to A1c check he was out of his diabetes medications x 2 weeks.)  Diabetes medication side effects?: No    Problem Solving:  Problem Solving Assessed Today: Yes  Is the patient at risk for hypoglycemia?: Yes  Hypoglycemia Frequency: Never    Reducing Risks:  Diabetes Risks: Family History, Age over 45 years, Hyperlipidemia  CAD Risks: Family history, Male sex  Has dilated eye exam at least once a year?: Yes  Sees dentist every 6 months?: No  Feet checked by healthcare provider in the last year?: Yes    Healthy Coping:  Healthy Coping Assessed Today: Yes  Emotional response to diabetes: Ready to learn, Concern for health and well-being  Informal Support system:: Spouse  Stage of change: PREPARATION (Decided to change - considering how)  Patient Activation Measure Survey Score:  TESSA Score (Last Two) 7/10/2018   TESSA Raw Score 30   Activation Score 56   TESSA Level 3     Diabetes knowledge and skills assessment:   Patient is knowledgeable in diabetes management concepts related to: No previous education.      Patient needs further education on the following diabetes management concepts: Healthy Eating, Being Active and Monitoring, Taking medication, Problem solving, Reducing Risks, Healthy coping.     Based on learning assessment above, most appropriate setting for further diabetes education would be: Individual setting.      INTERVENTIONS:    Education provided today on:  AADE Self-Care Behaviors:  Healthy Eating: consistency in  amount, composition, and timing of food intake and weight reduction, plate method  Being Active:  Glucose lowering effect of exercise  Monitoring:  Pt desires Freestyle Kellie - discussed use, testing times, target levels.  Glucose check with meter in office today was 373 - 1.5 hours after breakfast sandwich and 12 oz Coke.  Medications:  Discussed change from Januvia to GLP-1 to offer weight loss benefit.  Review Ozempic - mechanism of action, contraindications, side effects dosing, injection technique  Reducing Risks:  A1c goal    Opportunities for ongoing education and support in diabetes-self management were discussed.    Pt verbalized understanding of concepts discussed and recommendations provided today.       Education Materials Provided:  Type 2 Diabetes Basics  Freestyle Kellie 2 information  Ozempic Savings Card     ASSESSMENT:  Recent A1c above target at 10.4%.  Pt states he was not taking his diabetes medications for two weeks prior to check - doubt this is complete reason for increase in A1c.   Pt does desire weight loss.  Had lap band in the past but was removed.  Considering gastric sleeve - may begin process this fall.  Willing to test but wants Freestyle Kellie.  Pt listened and participated well in session.      Patient's most recent   Lab Results   Component Value Date    A1C 10.4 05/06/2022    A1C 6.5 03/22/2021    is not meeting goal of <7.0    PLAN  Work on limiting foods high in carbohydrates in diet.  Find alternate to apple juice.  Begin using Freestyle Kellie 2 for monitoring once obtained.  Call for help with set up if needed.  We can begin data sharing once set up.  Message sent to provider to continue Metformin XR at current dose, stop Januvia and begin Ozempic 0.25 mg weekly and titrate dose as needed for glucose control and weight loss benefit.    See Patient Instructions for co-developed, patient-stated behavior change goals.  AVS printed and provided to patient today.  Will call pt in  one week to make sure he was able to obtain Freestyle Kellie 2 and Ozempic.  Follow up in six weeks.     Time Spent: 60 minutes  Encounter Type: Individual    Any diabetes medication dose changes were made via the CDE Protocol and Collaborative Practice Agreement with the patient's referring provider. A copy of this encounter was shared with the provider.

## 2022-05-25 NOTE — LETTER
"    5/25/2022        RE: Jason Duncan  [address] [facility] [address]        Diabetes Self-Management Education & Support    Presents for: Individual review    SUBJECTIVE/OBJECTIVE:  Presents for: Individual review  Accompanied by: Self  Diabetes education in the past 24mo: No  Focus of Visit: Monitoring, Taking Medication, Healthy Eating, Assistance w/ making life changes  Diabetes type: Type 2  Date of diagnosis: 4/2018  Disease course: Getting harder to manage  How confident are you filling out medical forms by yourself:: Extremely  Diabetes management related comments/concerns: No previous education - wants to learn.  Transportation concerns: No  Difficulty affording diabetes medication?: No  Other concerns:: None  Cultural Influences/Ethnic Background:  Not  or     Diabetes Symptoms & Complications:  Fatigue: No  Neuropathy: No  Polydipsia: No  Polyphagia: No  Polyuria: No  Visual change: No  Slow healing wounds: No  Symptom course: Stable  Weight trend: Stable  Complications assessed today?: Yes  CVA: No  Heart disease: No  Nephropathy: No  Retinopathy: No    Patient Problem List and Family Medical History reviewed for relevant medical history, current medical status, and diabetes risk factors.    Vitals:  /76   Pulse 96   Resp 16   Ht 1.88 m (6' 2\")   Wt (!) 153.8 kg (339 lb)   SpO2 98%   BMI 43.53 kg/m    Estimated body mass index is 43.53 kg/m  as calculated from the following:    Height as of this encounter: 1.88 m (6' 2\").    Weight as of this encounter: 153.8 kg (339 lb).   Last 3 BP:   BP Readings from Last 3 Encounters:   05/25/22 132/76   05/06/22 130/80   03/11/22 161/93       History   Smoking Status     Never Smoker   Smokeless Tobacco     Never Used       Labs:  Lab Results   Component Value Date    A1C 10.4 05/06/2022    A1C 6.5 03/22/2021     Lab Results   Component Value Date     05/06/2022     03/22/2021     Lab Results   Component Value Date    "  05/06/2022    LDL 91 01/14/2021     HDL Cholesterol   Date Value Ref Range Status   01/14/2021 40 >39 mg/dL Final     Direct Measure HDL   Date Value Ref Range Status   05/06/2022 29 (L) >=40 mg/dL Final   ]  GFR Estimate   Date Value Ref Range Status   05/06/2022 >90 >60 mL/min/1.73m2 Final     Comment:     Effective December 21, 2021 eGFRcr in adults is calculated using the 2021 CKD-EPI creatinine equation which includes age and gender (Berta et al., NEJ, DOI: 10.1056/MBZOgs6142490)   03/22/2021 84 >60 mL/min/[1.73_m2] Final     Comment:     Non  GFR Calc  Starting 12/18/2018, serum creatinine based estimated GFR (eGFR) will be   calculated using the Chronic Kidney Disease Epidemiology Collaboration   (CKD-EPI) equation.       GFR Estimate If Black   Date Value Ref Range Status   03/22/2021 >90 >60 mL/min/[1.73_m2] Final     Comment:      GFR Calc  Starting 12/18/2018, serum creatinine based estimated GFR (eGFR) will be   calculated using the Chronic Kidney Disease Epidemiology Collaboration   (CKD-EPI) equation.       Lab Results   Component Value Date    CR 0.88 05/06/2022    CR 1.04 03/22/2021     No results found for: MICROALBUMIN    Healthy Eating:  Healthy Eating Assessed Today: Yes  Cultural/Latter-day diet restrictions?: No  Meal planning/habits: None  Meals include: Breakfast, Lunch, Dinner  Breakfast: None  Lunch: 2 sandwiches, chips, fruit cup or fresh fruit, Reeses Peanut Butter cup (2 pack)  -  these items are spread throughout the day while he is working  Dinner: meat, potatoes, veggies - eating more salad with supper recently  Snacks: 1 package cheese/crackers, fruit  Beverages: Water, Juice, Soda (3 bottles apple juice, 1 coke every other day)  Has patient met with a dietitian in the past?: No    Being Active:  Being Active Assessed Today: Yes  Exercise:: Yes (Job is active)  Barrier to exercise: Time    Monitoring:  Monitoring Assessed Today: Yes  Did  patient bring glucose meter to appointment? : No (Has old one - never used it)    Taking Medications:  Diabetes Medication(s)     Biguanides       metFORMIN (GLUCOPHAGE XR) 500 MG 24 hr tablet    TAKE 2 TABLETS BY MOUTH 2 TIMES DAILY WITH MEALS    Dipeptidyl Peptidase-4 (DPP-4) Inhibitors       sitagliptin (JANUVIA) 100 MG tablet    Take 1 tablet (100 mg) by mouth daily        Taking Medication Assessed Today: Yes  Current Treatments: Oral Medication (taken by mouth) (Januvia 100 mg daily, Metformin XR 1000 mg bid)  Problems taking diabetes medications regularly?: No (Pt states prior to A1c check he was out of his diabetes medications x 2 weeks.)  Diabetes medication side effects?: No    Problem Solving:  Problem Solving Assessed Today: Yes  Is the patient at risk for hypoglycemia?: Yes  Hypoglycemia Frequency: Never    Reducing Risks:  Diabetes Risks: Family History, Age over 45 years, Hyperlipidemia  CAD Risks: Family history, Male sex  Has dilated eye exam at least once a year?: Yes  Sees dentist every 6 months?: No  Feet checked by healthcare provider in the last year?: Yes    Healthy Coping:  Healthy Coping Assessed Today: Yes  Emotional response to diabetes: Ready to learn, Concern for health and well-being  Informal Support system:: Spouse  Stage of change: PREPARATION (Decided to change - considering how)  Patient Activation Measure Survey Score:  TESSA Score (Last Two) 7/10/2018   TESSA Raw Score 30   Activation Score 56   TESSA Level 3     Diabetes knowledge and skills assessment:   Patient is knowledgeable in diabetes management concepts related to: No previous education.      Patient needs further education on the following diabetes management concepts: Healthy Eating, Being Active and Monitoring, Taking medication, Problem solving, Reducing Risks, Healthy coping.     Based on learning assessment above, most appropriate setting for further diabetes education would be: Individual  setting.      INTERVENTIONS:    Education provided today on:  AADE Self-Care Behaviors:  Healthy Eating: consistency in amount, composition, and timing of food intake and weight reduction, plate method  Being Active:  Glucose lowering effect of exercise  Monitoring:  Pt desires Freestyle Kellie - discussed use, testing times, target levels.  Glucose check with meter in office today was 373 - 1.5 hours after breakfast sandwich and 12 oz Coke.  Medications:  Discussed change from Januvia to GLP-1 to offer weight loss benefit.  Review Ozempic - mechanism of action, contraindications, side effects dosing, injection technique  Reducing Risks:  A1c goal    Opportunities for ongoing education and support in diabetes-self management were discussed.    Pt verbalized understanding of concepts discussed and recommendations provided today.       Education Materials Provided:  Type 2 Diabetes Basics  Freestyle Kellie 2 information  Ozempic Savings Card     ASSESSMENT:  Recent A1c above target at 10.4%.  Pt states he was not taking his diabetes medications for two weeks prior to check - doubt this is complete reason for increase in A1c.   Pt does desire weight loss.  Had lap band in the past but was removed.  Considering gastric sleeve - may begin process this fall.  Willing to test but wants Freestyle Kellie.  Pt listened and participated well in session.      Patient's most recent   Lab Results   Component Value Date    A1C 10.4 05/06/2022    A1C 6.5 03/22/2021    is not meeting goal of <7.0    PLAN  Work on limiting foods high in carbohydrates in diet.  Find alternate to apple juice.  Begin using Freestyle Kellie 2 for monitoring once obtained.  Call for help with set up if needed.  We can begin data sharing once set up.  Message sent to provider to continue Metformin XR at current dose, stop Januvia and begin Ozempic 0.25 mg weekly and titrate dose as needed for glucose control and weight loss benefit.    See Patient Instructions  for co-developed, patient-stated behavior change goals.  AVS printed and provided to patient today.  Will call pt in one week to make sure he was able to obtain Freestyle Kellie 2 and Ozempic.  Follow up in six weeks.     Time Spent: 60 minutes  Encounter Type: Individual    Any diabetes medication dose changes were made via the CDE Protocol and Collaborative Practice Agreement with the patient's referring provider. A copy of this encounter was shared with the provider.          Sincerely,        Viktoria Tran RD

## 2022-05-25 NOTE — PATIENT INSTRUCTIONS
-Work on limiting foods high in carbohydrates in diet.    -Be as active as able.  Exercise helps lower glucose levels.  -Begin using Freestyle Kellie 2 if covered.  Good times to scan glucose levels are fasting, before meals, 2 hours after meals.  -Target glucose levels are fasting and before meals , 2 hours after meals < 180.  -Keep taking current dose of Metformin.  -Stop Januvia.   -You will begin Ozempic 0.25 mg weekly.  Take this dose x 4 weeks and if no side effects then increase to 0.50 mg weekly.  -Recent A1c was 10.4%.  Goal is < 7.0%.   -Call if you have side effects from Ozempic.   -I will call you in a week to see if you were able to obtain Ozempic and Freestyle.  We can send email then for sharing data if you are set up.  -Follow up in six weeks.  -FRIDA Cordero, [facility] [phone].

## 2022-05-25 NOTE — TELEPHONE ENCOUNTER
Pt was here today for diabetes visit.  Okay to discontinue Januvia and begin Ozempic 0.25 mg weekly and titrate dose for glucose control and weight loss benefit?  If so, please sign pended order.    Thanks!

## 2022-06-01 ENCOUNTER — TRANSFERRED RECORDS (OUTPATIENT)
Dept: MULTI SPECIALTY CLINIC | Facility: CLINIC | Age: 52
End: 2022-06-01

## 2022-06-01 LAB — RETINOPATHY: NORMAL

## 2022-06-08 LAB — NONINV COLON CA DNA+OCC BLD SCRN STL QL: NEGATIVE

## 2022-08-23 DIAGNOSIS — J30.2 CHRONIC SEASONAL ALLERGIC RHINITIS: ICD-10-CM

## 2022-08-23 RX ORDER — CETIRIZINE HYDROCHLORIDE 10 MG/1
TABLET ORAL
Qty: 90 TABLET | Refills: 0 | Status: SHIPPED | OUTPATIENT
Start: 2022-08-23 | End: 2022-11-23

## 2022-08-23 NOTE — TELEPHONE ENCOUNTER
zrytec      Last Written Prescription Date:  4/27/22  Last Fill Quantity: 90,   # refills: 0  Last Office Visit: 4/12/22  Future Office visit:

## 2022-10-06 NOTE — PROGRESS NOTES
Assessment & Plan        Type 2 diabetes mellitus without complication, without long-term current use of insulin (H)  - sitagliptin (JANUVIA) 100 MG tablet; Take 1 tablet (100 mg) by mouth daily  - semaglutide (OZEMPIC, 0.25 OR 0.5 MG/DOSE,) 2 MG/1.5ML SOPN pen; Inject 0.25 mg weekly x 4 weeks.  If no side effects after 4 weeks then inject 0.50 mg weekly.  - metFORMIN (GLUCOPHAGE XR) 500 MG 24 hr tablet; TAKE 2 TABLETS BY MOUTH 2 TIMES DAILY WITH MEALS  - Hemoglobin A1c  - Comprehensive metabolic panel  - Albumin Random Urine Quantitative with Creat Ratio  - UA reflex to Microscopic      Hyperlipidemia LDL goal <100  - simvastatin (ZOCOR) 40 MG tablet; TAKE 1 TABLET BY MOUTH DAILY AT BEDTIME  - Lipid Profile (Chol, Trig, HDL, LDL calc)  - Comprehensive metabolic panel      Benign essential hypertension  - lisinopril (ZESTRIL) 10 MG tablet; TAKE 1 AND 1/2 TABLETS (15MG) BY MOUTH DAILY  - amLODIPine (NORVASC) 10 MG tablet; Take 1 tablet (10 mg) by mouth daily  - Comprehensive metabolic panel  - TSH with free T4 reflex      Idiopathic gout, unspecified site  - Follow-up as needed      Vitamin D deficiency  - Vitamin D level  - D3 2000 U OTC daily      RACHELE (obstructive sleep apnea)  - Miscellaneous Order for DME - ONLY FOR DME      Chronic seasonal allergic rhinitis  - montelukast (SINGULAIR) 10 MG tablet; TAKE 1 TABLET BY MOUTH DAILY AT BEDTIME  - Zyrtec or other OTC daily      Arthritis  - Ibuprofen as needed  - cyclobenzaprine (FLEXERIL) 10 MG tablet; TAKE 1 TABLET BY MOUTH NIGHTLY AS NEEDED FOR MUSCLE SPASMS      Acute pain of right knee  - Ice  - Elevate as able  - Ibuprofen as needed  - XR Knee Right 3 Views (Clinic Performed); Future  - Orthopedic  Referral; Future    Immunization due  - ZOSTER VACCINE RECOMBINANT ADJUVANTED IM NJX          40  minutes spent on the date of the encounter doing chart review, review of outside records, review of test results, interpretation of tests, patient visit and  "documentation          BMI:   Estimated body mass index is 43.27 kg/m  as calculated from the following:    Height as of 5/25/22: 1.88 m (6' 2\").    Weight as of this encounter: 152.9 kg (337 lb).   Weight management plan: Discussed healthy diet and exercise guidelines        Return in about 6 months (around 4/7/2023).      Samira Whitney CNP  [facility] [place] - ABIOLA Martinez is a 52 year old, presenting for the following health issues:  Chronic Disease Management      RACHELE  He uses CPAP  Machine is 10 years old  Due for supplies and possibly a new machine  Face to face visit today  Stable  Length of need, lifetime        Diabetes Follow-up    How often are you checking your blood sugar? Not at all    What concerns do you have today about your diabetes? None     Do you have any of these symptoms? (Select all that apply)  No numbness or tingling in feet.  No redness, sores or blisters on feet.  No complaints of excessive thirst.  No reports of blurry vision.  No significant changes to weight.  Not checking sugars      Hyperlipidemia Follow-Up    Are you regularly taking any medication or supplement to lower your cholesterol?   Yes- simvastatin 40 mg daily    Are you having muscle aches or other side effects that you think could be caused by your cholesterol lowering medication?  No      Hypertension Follow-up    Do you check your blood pressure regularly outside of the clinic? No     Are you following a low salt diet? Yes    Are your blood pressures ever more than 140 on the top number (systolic) OR more   than 90 on the bottom number (diastolic), for example 140/90? Yes       Allergic Rhinitis  - Stable on OTC medication and Singulair      BP Readings from Last 2 Encounters:   10/07/22 135/82   05/25/22 132/76         Hemoglobin A1C (%)   Date Value   05/06/2022 10.4 (H)   03/22/2021 6.5 (H)   01/14/2021 8.1 (H)     LDL Cholesterol Calculated (mg/dL)   Date Value   05/06/2022 113 (H)   01/14/2021 91 "   07/13/2020 89           Musculoskeletal problem/pain  When did you first notice your pain? - Acute Pain   Have you seen anyone else for your pain? No  Where in your body do you have pain?   Onset/Duration: 2 weeks  Description  Location: knee - right  Joint Swelling: No  Redness: No  Pain: YES  Warmth: No  Intensity:  Mild to severe  Progression of Symptoms:  same  Accompanying signs and symptoms:   Fevers: No  Numbness/tingling/weakness: No  History  Trauma to the area: No  Recent illness:  No  Previous similar problem: yes  Previous evaluation:  No  Precipitating or alleviating factors:  Aggravating factors include: standing, walking, climbing stairs and overuse  Therapies tried and outcome: rest/inactivity, heat and ice        Patient Active Problem List   Diagnosis     Benign essential hypertension     Mixed hearing loss, unilateral     Encounter for monitoring statin therapy     Morbid obesity due to excess calories (H)     RACHELE (obstructive sleep apnea)     Vitamin D deficiency     Type 2 diabetes mellitus without complication, without long-term current use of insulin (H)     Gastric band slippage     Status post bariatric surgery     Hyperlipidemia LDL goal <100     Primary insomnia     Chronic gout of multiple sites     Primary osteoarthritis of left knee     Past Surgical History:   Procedure Laterality Date     ARTHROSCOPY KNEE Left 3/30/2021    Procedure: LEFT KNEE ARTHROSCOPY:PARTIAL MEDIAL MENISECTOMY, PARTIAL LATERAL MENISECTOMY;  Surgeon: Donny Au MD;  Location: [facility]     GASTRIC RESTRICTIVE PROCEDURE, OPEN, REMOVE/REPLACE SUBCUTANEOUS PORT  2008     GI SURGERY  2015    removal of lap band      lap band  2008     nasal septoplasty       SEPTOPLASTY      Nasal       Social History     Tobacco Use     Smoking status: Never Smoker     Smokeless tobacco: Never Used   Substance Use Topics     Alcohol use: Yes     Comment: rarely     Family History   Problem Relation Age of Onset     Dementia Mother  72        Cause of death     Diabetes Father      Respiratory Father         COPD     Cardiovascular Father         CHF           Current Outpatient Medications   Medication Sig Dispense Refill     amLODIPine (NORVASC) 10 MG tablet Take 1 tablet (10 mg) by mouth daily 90 tablet 1     aspirin 81 MG tablet Take by mouth daily       cetirizine (ZYRTEC) 10 MG tablet TAKE 1 TABLET BY MOUTH EVERY EVENING 90 tablet 0     cyclobenzaprine (FLEXERIL) 10 MG tablet TAKE 1 TABLET BY MOUTH NIGHTLY AS NEEDED FOR MUSCLE SPASMS 90 tablet 3     lisinopril (ZESTRIL) 10 MG tablet TAKE 1 AND 1/2 TABLETS (15MG) BY MOUTH DAILY 135 tablet 1     loratadine (CLARITIN) 10 MG tablet TAKE 1 TABLET BY MOUTH DAILY 90 tablet 1     metFORMIN (GLUCOPHAGE XR) 500 MG 24 hr tablet TAKE 2 TABLETS BY MOUTH 2 TIMES DAILY WITH MEALS 360 tablet 1     methocarbamol (ROBAXIN) 750 MG tablet TAKE 1 TABLET BY MOUTH 3 TIMES DAILY AS NEEDED FOR MUSCLE SPASMS 360 tablet 1     montelukast (SINGULAIR) 10 MG tablet TAKE 1 TABLET BY MOUTH DAILY AT BEDTIME 90 tablet 3     Multiple Vitamins-Minerals (ZINC PO)        multivitamin w/minerals (THERA-VIT-M) tablet Take 1 tablet by mouth daily       order for DME c-pap mask and supplies    DX: G47.33, sleep apnea      RACHELE - uses CPAP  Stable, does well with CPAP  Is due for annual supply renewal  Length of need:  Lifetime  Face to face visit - 2/21/2020 1 Device 11     semaglutide (OZEMPIC, 0.25 OR 0.5 MG/DOSE,) 2 MG/1.5ML SOPN pen Inject 0.25 mg weekly x 4 weeks.  If no side effects after 4 weeks then inject 0.50 mg weekly. 1.5 mL 1     simvastatin (ZOCOR) 40 MG tablet TAKE 1 TABLET BY MOUTH DAILY AT BEDTIME 90 tablet 1     sitagliptin (JANUVIA) 100 MG tablet Take 1 tablet (100 mg) by mouth daily 90 tablet 1     VITAMIN D, CHOLECALCIFEROL, PO Take 5,000 Units by mouth daily            No Known Allergies         Recent Labs   Lab Test 05/06/22  0816 03/11/22  1234 03/22/21  1410 01/14/21  0910 07/13/20  1435   A1C 10.4*  --   6.5* 8.1* 6.3*   *  --   --  91 89   HDL 29*  --   --  40 39*   TRIG 290*  --   --  148 219*   * 120* 157* 134* 100*   CR 0.88 1.02 1.04 0.92 0.93   GFRESTIMATED >90 90 84 >90 >90   GFRESTBLACK  --   --  >90 >90 >90   POTASSIUM 4.0 3.7 3.8 4.0 3.6   TSH 1.20  --   --  1.52 1.07        BP Readings from Last 3 Encounters:   10/07/22 135/82   05/25/22 132/76   05/06/22 130/80    Wt Readings from Last 3 Encounters:   10/07/22 (!) 152.9 kg (337 lb)   05/25/22 (!) 153.8 kg (339 lb)   05/06/22 (!) 153.3 kg (338 lb)              Review of Systems   Constitutional, HEENT, cardiovascular, pulmonary, GI, , musculoskeletal, neuro, skin, endocrine and psych systems are negative, except as otherwise noted.          Objective    /82 (BP Location: Left arm, Patient Position: Sitting, Cuff Size: Adult Large)   Pulse 67   Temp 96.8  F (36  C) (Tympanic)   Resp 20   Wt (!) 152.9 kg (337 lb)   SpO2 98%   BMI 43.27 kg/m    Body mass index is 43.27 kg/m .           Physical Exam   GENERAL: healthy, alert and no distress  EYES: Eyes grossly normal to inspection, PERRL and conjunctivae and sclerae normal  HENT: ear canals and TM's normal, nose and mouth without ulcers or lesions  NECK: no adenopathy, no asymmetry, masses, or scars and thyroid normal to palpation  RESP: lungs clear to auscultation - no rales, rhonchi or wheezes  CV: regular rate and rhythm, normal S1 S2, no S3 or S4, no murmur, click or rub, no peripheral edema and peripheral pulses strong  MS: no gross musculoskeletal defects noted, no edema  SKIN: no suspicious lesions or rashes  PSYCH: mentation appears normal, affect normal/bright  Diabetic foot exam: normal DP and PT pulses, no trophic changes or ulcerative lesions and normal sensory exam      Results for orders placed or performed in visit on 10/07/22   XR Knee Right 3 Views (Clinic Performed)     Status: None    Narrative    PROCEDURE:  XR KNEE RIGHT 3 VIEWS    HISTORY: Right knee  pain.    COMPARISON:  2/13/2017    TECHNIQUE:  3 views right knee.    FINDINGS:  There is progressive tricompartmental osteoarthritis of the  right knee, moderate in the patellofemoral and medial compartments. No  acute fracture is identified.       Impression    IMPRESSION: Progressive osteoarthritis of the right knee.      REGINA BUSTOS MD         SYSTEM ID:  [id number]   Results for orders placed or performed in visit on 10/07/22   Lipid Profile (Chol, Trig, HDL, LDL calc)     Status: None   Result Value Ref Range    Cholesterol 142 <200 mg/dL    Triglycerides 125 <150 mg/dL    Direct Measure HDL 42 >=40 mg/dL    LDL Cholesterol Calculated 75 <=100 mg/dL    Non HDL Cholesterol 100 <130 mg/dL    Patient Fasting > 8hrs? Yes     Narrative    Cholesterol  Desirable:  <200 mg/dL    Triglycerides  Normal:  Less than 150 mg/dL  Borderline High:  150-199 mg/dL  High:  200-499 mg/dL  Very High:  Greater than or equal to 500 mg/dL    Direct Measure HDL  Female:  Greater than or equal to 50 mg/dL   Male:  Greater than or equal to 40 mg/dL    LDL Cholesterol  Desirable:  <100mg/dL  Above Desirable:  100-129 mg/dL   Borderline High:  130-159 mg/dL   High:  160-189 mg/dL   Very High:  >= 190 mg/dL    Non HDL Cholesterol  Desirable:  130 mg/dL  Above Desirable:  130-159 mg/dL  Borderline High:  160-189 mg/dL  High:  190-219 mg/dL  Very High:  Greater than or equal to 220 mg/dL   Hemoglobin A1c     Status: Abnormal   Result Value Ref Range    Estimated Average Glucose 171 mg/dL    Hemoglobin A1C 7.6 (H) 0.0 - 5.6 %   Comprehensive metabolic panel     Status: Abnormal   Result Value Ref Range    Sodium 138 133 - 144 mmol/L    Potassium 3.9 3.4 - 5.3 mmol/L    Chloride 105 94 - 109 mmol/L    Carbon Dioxide (CO2) 27 20 - 32 mmol/L    Anion Gap 6 3 - 14 mmol/L    Urea Nitrogen 15 7 - 30 mg/dL    Creatinine 1.01 0.66 - 1.25 mg/dL    Calcium 9.5 8.5 - 10.1 mg/dL    Glucose 130 (H) 70 - 99 mg/dL    Alkaline Phosphatase 62 40 - 150  U/L    AST 80 (H) 0 - 45 U/L     (H) 0 - 70 U/L    Protein Total 7.9 6.8 - 8.8 g/dL    Albumin 3.9 3.4 - 5.0 g/dL    Bilirubin Total 0.8 0.2 - 1.3 mg/dL    GFR Estimate >90 >60 mL/min/1.73m2   Albumin Random Urine Quantitative with Creat Ratio     Status: Abnormal   Result Value Ref Range    Creatinine Urine mg/dL 104 mg/dL    Albumin Urine mg/L 22 mg/L    Albumin Urine mg/g Cr 21.15 (H) 0.00 - 17.00 mg/g Cr   UA reflex to Microscopic     Status: Normal   Result Value Ref Range    Color Urine Yellow Colorless, Straw, Light Yellow, Yellow    Appearance Urine Clear Clear    Glucose Urine Negative Negative mg/dL    Bilirubin Urine Negative Negative    Ketones Urine Negative Negative mg/dL    Specific Gravity Urine 1.020 1.003 - 1.035    Blood Urine Negative Negative    pH Urine 6.0 5.0 - 7.0    Protein Albumin Urine Negative Negative mg/dL    Urobilinogen Urine 1.0 0.2, 1.0 E.U./dL    Nitrite Urine Negative Negative    Leukocyte Esterase Urine Negative Negative    Narrative    Microscopic not indicated   TSH with free T4 reflex     Status: Normal   Result Value Ref Range    TSH 1.34 0.40 - 4.00 mU/L           PROCEDURE:  XR KNEE RIGHT 3 VIEWS     HISTORY: Right knee pain.     COMPARISON:  2/13/2017     TECHNIQUE:  3 views right knee.     FINDINGS:  There is progressive tricompartmental osteoarthritis of the  right knee, moderate in the patellofemoral and medial compartments. No  acute fracture is identified.                                                                       IMPRESSION: Progressive osteoarthritis of the right knee.       REGINA BUSTOS MD

## 2022-10-07 ENCOUNTER — ANCILLARY PROCEDURE (OUTPATIENT)
Dept: GENERAL RADIOLOGY | Facility: OTHER | Age: 52
End: 2022-10-07
Attending: NURSE PRACTITIONER
Payer: COMMERCIAL

## 2022-10-07 ENCOUNTER — OFFICE VISIT (OUTPATIENT)
Dept: FAMILY MEDICINE | Facility: OTHER | Age: 52
End: 2022-10-07
Attending: NURSE PRACTITIONER
Payer: COMMERCIAL

## 2022-10-07 VITALS
SYSTOLIC BLOOD PRESSURE: 135 MMHG | BODY MASS INDEX: 43.27 KG/M2 | TEMPERATURE: 96.8 F | RESPIRATION RATE: 20 BRPM | DIASTOLIC BLOOD PRESSURE: 82 MMHG | OXYGEN SATURATION: 98 % | WEIGHT: 315 LBS | HEART RATE: 67 BPM

## 2022-10-07 DIAGNOSIS — J30.2 CHRONIC SEASONAL ALLERGIC RHINITIS: ICD-10-CM

## 2022-10-07 DIAGNOSIS — M10.00 ACUTE IDIOPATHIC GOUT, UNSPECIFIED SITE: ICD-10-CM

## 2022-10-07 DIAGNOSIS — G47.33 OSA (OBSTRUCTIVE SLEEP APNEA): ICD-10-CM

## 2022-10-07 DIAGNOSIS — M25.561 RIGHT KNEE PAIN: ICD-10-CM

## 2022-10-07 DIAGNOSIS — M25.561 ACUTE PAIN OF RIGHT KNEE: ICD-10-CM

## 2022-10-07 DIAGNOSIS — E55.9 VITAMIN D DEFICIENCY: ICD-10-CM

## 2022-10-07 DIAGNOSIS — I10 BENIGN ESSENTIAL HYPERTENSION: ICD-10-CM

## 2022-10-07 DIAGNOSIS — E11.9 TYPE 2 DIABETES MELLITUS WITHOUT COMPLICATION, WITHOUT LONG-TERM CURRENT USE OF INSULIN (H): Primary | ICD-10-CM

## 2022-10-07 DIAGNOSIS — Z23 IMMUNIZATION DUE: ICD-10-CM

## 2022-10-07 DIAGNOSIS — E78.5 HYPERLIPIDEMIA LDL GOAL <100: ICD-10-CM

## 2022-10-07 DIAGNOSIS — M19.90 ARTHRITIS: ICD-10-CM

## 2022-10-07 LAB
ALBUMIN SERPL-MCNC: 3.9 G/DL (ref 3.4–5)
ALBUMIN UR-MCNC: NEGATIVE MG/DL
ALP SERPL-CCNC: 62 U/L (ref 40–150)
ALT SERPL W P-5'-P-CCNC: 128 U/L (ref 0–70)
ANION GAP SERPL CALCULATED.3IONS-SCNC: 6 MMOL/L (ref 3–14)
APPEARANCE UR: CLEAR
AST SERPL W P-5'-P-CCNC: 80 U/L (ref 0–45)
BILIRUB SERPL-MCNC: 0.8 MG/DL (ref 0.2–1.3)
BILIRUB UR QL STRIP: NEGATIVE
BUN SERPL-MCNC: 15 MG/DL (ref 7–30)
CALCIUM SERPL-MCNC: 9.5 MG/DL (ref 8.5–10.1)
CHLORIDE BLD-SCNC: 105 MMOL/L (ref 94–109)
CHOLEST SERPL-MCNC: 142 MG/DL
CO2 SERPL-SCNC: 27 MMOL/L (ref 20–32)
COLOR UR AUTO: YELLOW
CREAT SERPL-MCNC: 1.01 MG/DL (ref 0.66–1.25)
CREAT UR-MCNC: 104 MG/DL
EST. AVERAGE GLUCOSE BLD GHB EST-MCNC: 171 MG/DL
FASTING STATUS PATIENT QL REPORTED: YES
GFR SERPL CREATININE-BSD FRML MDRD: >90 ML/MIN/1.73M2
GLUCOSE BLD-MCNC: 130 MG/DL (ref 70–99)
GLUCOSE UR STRIP-MCNC: NEGATIVE MG/DL
HBA1C MFR BLD: 7.6 % (ref 0–5.6)
HDLC SERPL-MCNC: 42 MG/DL
HGB UR QL STRIP: NEGATIVE
KETONES UR STRIP-MCNC: NEGATIVE MG/DL
LDLC SERPL CALC-MCNC: 75 MG/DL
LEUKOCYTE ESTERASE UR QL STRIP: NEGATIVE
MICROALBUMIN UR-MCNC: 22 MG/L
MICROALBUMIN/CREAT UR: 21.15 MG/G CR (ref 0–17)
NITRATE UR QL: NEGATIVE
NONHDLC SERPL-MCNC: 100 MG/DL
PH UR STRIP: 6 [PH] (ref 5–7)
POTASSIUM BLD-SCNC: 3.9 MMOL/L (ref 3.4–5.3)
PROT SERPL-MCNC: 7.9 G/DL (ref 6.8–8.8)
SODIUM SERPL-SCNC: 138 MMOL/L (ref 133–144)
SP GR UR STRIP: 1.02 (ref 1–1.03)
TRIGL SERPL-MCNC: 125 MG/DL
TSH SERPL DL<=0.005 MIU/L-ACNC: 1.34 MU/L (ref 0.4–4)
UROBILINOGEN UR STRIP-ACNC: 1 E.U./DL

## 2022-10-07 PROCEDURE — 84443 ASSAY THYROID STIM HORMONE: CPT | Performed by: NURSE PRACTITIONER

## 2022-10-07 PROCEDURE — 81003 URINALYSIS AUTO W/O SCOPE: CPT | Performed by: NURSE PRACTITIONER

## 2022-10-07 PROCEDURE — 80053 COMPREHEN METABOLIC PANEL: CPT | Performed by: NURSE PRACTITIONER

## 2022-10-07 PROCEDURE — 73562 X-RAY EXAM OF KNEE 3: CPT | Mod: TC | Performed by: RADIOLOGY

## 2022-10-07 PROCEDURE — 83036 HEMOGLOBIN GLYCOSYLATED A1C: CPT | Performed by: NURSE PRACTITIONER

## 2022-10-07 PROCEDURE — 80061 LIPID PANEL: CPT | Performed by: NURSE PRACTITIONER

## 2022-10-07 PROCEDURE — 82306 VITAMIN D 25 HYDROXY: CPT | Performed by: NURSE PRACTITIONER

## 2022-10-07 PROCEDURE — 90750 HZV VACC RECOMBINANT IM: CPT | Performed by: NURSE PRACTITIONER

## 2022-10-07 PROCEDURE — 99215 OFFICE O/P EST HI 40 MIN: CPT | Mod: 25 | Performed by: NURSE PRACTITIONER

## 2022-10-07 PROCEDURE — 90471 IMMUNIZATION ADMIN: CPT | Performed by: NURSE PRACTITIONER

## 2022-10-07 PROCEDURE — 82043 UR ALBUMIN QUANTITATIVE: CPT | Performed by: NURSE PRACTITIONER

## 2022-10-07 PROCEDURE — 36415 COLL VENOUS BLD VENIPUNCTURE: CPT | Performed by: NURSE PRACTITIONER

## 2022-10-07 RX ORDER — CYCLOBENZAPRINE HCL 10 MG
TABLET ORAL
Qty: 90 TABLET | Refills: 3 | Status: SHIPPED | OUTPATIENT
Start: 2022-10-07 | End: 2023-02-27

## 2022-10-07 RX ORDER — METFORMIN HCL 500 MG
TABLET, EXTENDED RELEASE 24 HR ORAL
Qty: 360 TABLET | Refills: 1 | Status: SHIPPED | OUTPATIENT
Start: 2022-10-07 | End: 2023-02-27

## 2022-10-07 RX ORDER — MONTELUKAST SODIUM 10 MG/1
TABLET ORAL
Qty: 90 TABLET | Refills: 3 | Status: SHIPPED | OUTPATIENT
Start: 2022-10-07 | End: 2023-02-27

## 2022-10-07 RX ORDER — AMLODIPINE BESYLATE 10 MG/1
10 TABLET ORAL DAILY
Qty: 90 TABLET | Refills: 1 | Status: SHIPPED | OUTPATIENT
Start: 2022-10-07 | End: 2023-02-27

## 2022-10-07 RX ORDER — LISINOPRIL 10 MG/1
TABLET ORAL
Qty: 135 TABLET | Refills: 1 | Status: SHIPPED | OUTPATIENT
Start: 2022-10-07 | End: 2023-02-27

## 2022-10-07 RX ORDER — SIMVASTATIN 40 MG
TABLET ORAL
Qty: 90 TABLET | Refills: 1 | Status: SHIPPED | OUTPATIENT
Start: 2022-10-07 | End: 2023-02-27

## 2022-10-07 RX ORDER — SEMAGLUTIDE 1.34 MG/ML
INJECTION, SOLUTION SUBCUTANEOUS
Qty: 1.5 ML | Refills: 1 | Status: SHIPPED | OUTPATIENT
Start: 2022-10-07 | End: 2022-10-26

## 2022-10-07 ASSESSMENT — PATIENT HEALTH QUESTIONNAIRE - PHQ9
5. POOR APPETITE OR OVEREATING: NOT AT ALL
SUM OF ALL RESPONSES TO PHQ QUESTIONS 1-9: 0

## 2022-10-07 ASSESSMENT — ANXIETY QUESTIONNAIRES
3. WORRYING TOO MUCH ABOUT DIFFERENT THINGS: NOT AT ALL
5. BEING SO RESTLESS THAT IT IS HARD TO SIT STILL: NOT AT ALL
2. NOT BEING ABLE TO STOP OR CONTROL WORRYING: NOT AT ALL
1. FEELING NERVOUS, ANXIOUS, OR ON EDGE: NOT AT ALL
7. FEELING AFRAID AS IF SOMETHING AWFUL MIGHT HAPPEN: NOT AT ALL
GAD7 TOTAL SCORE: 0
IF YOU CHECKED OFF ANY PROBLEMS ON THIS QUESTIONNAIRE, HOW DIFFICULT HAVE THESE PROBLEMS MADE IT FOR YOU TO DO YOUR WORK, TAKE CARE OF THINGS AT HOME, OR GET ALONG WITH OTHER PEOPLE: NOT DIFFICULT AT ALL
6. BECOMING EASILY ANNOYED OR IRRITABLE: NOT AT ALL
GAD7 TOTAL SCORE: 0

## 2022-10-07 ASSESSMENT — PAIN SCALES - GENERAL: PAINLEVEL: MILD PAIN (3)

## 2022-10-07 NOTE — PATIENT INSTRUCTIONS
"  Assessment & Plan        Type 2 diabetes mellitus without complication, without long-term current use of insulin (H)  - sitagliptin (JANUVIA) 100 MG tablet; Take 1 tablet (100 mg) by mouth daily  - semaglutide (OZEMPIC, 0.25 OR 0.5 MG/DOSE,) 2 MG/1.5ML SOPN pen; Inject 0.25 mg weekly x 4 weeks.  If no side effects after 4 weeks then inject 0.50 mg weekly.  - metFORMIN (GLUCOPHAGE XR) 500 MG 24 hr tablet; TAKE 2 TABLETS BY MOUTH 2 TIMES DAILY WITH MEALS  - Hemoglobin A1c  - Comprehensive metabolic panel  - Albumin Random Urine Quantitative with Creat Ratio  - UA reflex to Microscopic      Hyperlipidemia LDL goal <100  - simvastatin (ZOCOR) 40 MG tablet; TAKE 1 TABLET BY MOUTH DAILY AT BEDTIME  - Lipid Profile (Chol, Trig, HDL, LDL calc)  - Comprehensive metabolic panel      Benign essential hypertension  - lisinopril (ZESTRIL) 10 MG tablet; TAKE 1 AND 1/2 TABLETS (15MG) BY MOUTH DAILY  - amLODIPine (NORVASC) 10 MG tablet; Take 1 tablet (10 mg) by mouth daily  - Comprehensive metabolic panel  - TSH with free T4 reflex      Idiopathic gout, unspecified site  - Follow-up as needed      Vitamin D deficiency  - Vitamin D level  - D3 2000 U OTC daily      RACHELE (obstructive sleep apnea)  - Miscellaneous Order for DME - ONLY FOR DME      Chronic seasonal allergic rhinitis  - montelukast (SINGULAIR) 10 MG tablet; TAKE 1 TABLET BY MOUTH DAILY AT BEDTIME  - Zyrtec or other OTC daily      Arthritis  - Ibuprofen as needed  - cyclobenzaprine (FLEXERIL) 10 MG tablet; TAKE 1 TABLET BY MOUTH NIGHTLY AS NEEDED FOR MUSCLE SPASMS      Acute pain of right knee  - Ice  - Elevate as able  - Ibuprofen as needed  - XR Knee Right 3 Views (Clinic Performed); Future  - Orthopedic  Referral; Future    Immunization due  - ZOSTER VACCINE RECOMBINANT ADJUVANTED IM NJX        BMI:   Estimated body mass index is 43.27 kg/m  as calculated from the following:    Height as of 5/25/22: 1.88 m (6' 2\").    Weight as of this encounter: 152.9 kg " (337 lb).   Weight management plan: Discussed healthy diet and exercise guidelines        Return in about 6 months (around 4/7/2023).      Samira Whitney CNP  [facility]

## 2022-10-07 NOTE — NURSING NOTE
"Chief Complaint   Patient presents with     Chronic Disease Management       Initial /88 (BP Location: Left arm, Patient Position: Sitting, Cuff Size: Adult Large)   Pulse 67   Temp 96.8  F (36  C) (Tympanic)   Resp 20   Wt (!) 152.9 kg (337 lb)   SpO2 98%   BMI 43.27 kg/m   Estimated body mass index is 43.27 kg/m  as calculated from the following:    Height as of 5/25/22: 1.88 m (6' 2\").    Weight as of this encounter: 152.9 kg (337 lb).  Medication Reconciliation: complete  Dori Liu LPN  "

## 2022-10-07 NOTE — RESULT ENCOUNTER NOTE
A1C is much improved was 10.4 in May, now is 7.6.  Continue plan of care, and continue work on diet, activity.    Lipids look really good compared to last check,    Labs are stable    Samira QUEZADA  [phone]

## 2022-10-09 LAB — DEPRECATED CALCIDIOL+CALCIFEROL SERPL-MC: 50 UG/L (ref 20–75)

## 2022-10-10 DIAGNOSIS — G47.33 OBSTRUCTIVE SLEEP APNEA (ADULT) (PEDIATRIC): Primary | ICD-10-CM

## 2022-10-18 ENCOUNTER — TELEPHONE (OUTPATIENT)
Dept: FAMILY MEDICINE | Facility: OTHER | Age: 52
End: 2022-10-18

## 2022-10-18 DIAGNOSIS — M10.00 IDIOPATHIC GOUT, UNSPECIFIED CHRONICITY, UNSPECIFIED SITE: Primary | ICD-10-CM

## 2022-10-18 RX ORDER — INDOMETHACIN 25 MG/1
25 CAPSULE ORAL 3 TIMES DAILY PRN
Qty: 30 CAPSULE | Refills: 0 | Status: SHIPPED | OUTPATIENT
Start: 2022-10-18 | End: 2023-02-27

## 2022-10-18 NOTE — TELEPHONE ENCOUNTER
Patient called and is having a gout flare up. Requesting medication be sent to pharmacy. Please advise

## 2022-10-26 ENCOUNTER — OFFICE VISIT (OUTPATIENT)
Dept: ORTHOPEDICS | Facility: OTHER | Age: 52
End: 2022-10-26
Attending: NURSE PRACTITIONER
Payer: COMMERCIAL

## 2022-10-26 VITALS
HEIGHT: 75 IN | OXYGEN SATURATION: 96 % | TEMPERATURE: 97.4 F | HEART RATE: 79 BPM | SYSTOLIC BLOOD PRESSURE: 136 MMHG | BODY MASS INDEX: 39.17 KG/M2 | DIASTOLIC BLOOD PRESSURE: 78 MMHG | WEIGHT: 315 LBS

## 2022-10-26 DIAGNOSIS — M25.561 ACUTE PAIN OF RIGHT KNEE: ICD-10-CM

## 2022-10-26 PROCEDURE — 99213 OFFICE O/P EST LOW 20 MIN: CPT | Performed by: SPECIALIST

## 2022-10-26 ASSESSMENT — PAIN SCALES - GENERAL: PAINLEVEL: SEVERE PAIN (6)

## 2022-10-26 NOTE — PROGRESS NOTES
Service Date: 10/26/2022    Mr. Duncan is a 52-year-old male seen today for evaluation of right knee pain.  He has had right knee pain for probably a year or more.  Over the last few months, he has had a chief complaint of right knee pain.  He also complains of intermittently a sensation of locking in his knee, where he cannot get his knee straight and he tries to get it straight, it is painful, he wiggles his knee and something lets loose and then he is able to do it.  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  It gets aggravated by activity, relieved somewhat with rest.  He has 6/10 pain, describes it as a sharp pain over the medial and lateral aspects of his knee.  He notices some crepitation and some swelling as well.    PHYSICAL EXAMINATION:    GENERAL:  On today's exam, the patient is awake, alert, oriented, no acute distress.  Overall, general mood, affect and appearance are normal.  VITAL SIGNS:  Temperature 97.4, weight 337 pounds, blood pressure 136/78.  EXTREMITIES:  On evaluation of the right knee, patient has range of motion 0 to about 115 degrees of flexion.  He can do a straight leg raise against resistance without difficulty.  He has mild swelling and mild effusion.  He has a stable ligamentous exam.  He is tender medially over the joint line and with Lorraine's test he has some pain medially.  He has some crepitation with flexion and extension.  He is neurovascularly intact distally.  He has no pain with hip range of motion.     IMAGING:  X-rays taken include an AP, lateral and sunrise view of the knee.  X-rays show mild-to-moderate degenerative changes in the knee.    ASSESSMENT AND PLAN:  The patient may just have some degenerative changes in his knee.  He has a history of osteoarthritis in his opposite knee.  He does have mechanical symptoms though and I think that we should rule out a medial meniscal tear as possibly contributing to the  mechanical symptoms in his knee.  If the MRI does not show an obvious medial meniscal tear or any other surgical pathology, we will probably have him come back and do an injection with Kenalog and lidocaine.  I will contact him with the results of the MRI.    Avinash Hampton MD        D: 10/26/2022   T: 10/26/2022   MT: [place]    Name:     LEATHA OSPINA  MRN:      [id number]        Account:      [id number]   :      1970           Service Date: 10/26/2022       Document: [id number]

## 2022-10-26 NOTE — NURSING NOTE
"Chief Complaint   Patient presents with     Consult     Acute Pain of Right Knee       Initial /78 (Cuff Size: Adult Large)   Pulse 79   Temp 97.4  F (36.3  C) (Tympanic)   Ht 1.905 m (6' 3\")   Wt (!) 152.9 kg (337 lb)   SpO2 96%   BMI 42.12 kg/m   Estimated body mass index is 42.12 kg/m  as calculated from the following:    Height as of this encounter: 1.905 m (6' 3\").    Weight as of this encounter: 152.9 kg (337 lb).  Medication Reconciliation: complete  Tiffanie Moreland LPN    "

## 2022-10-27 ENCOUNTER — HOSPITAL ENCOUNTER (OUTPATIENT)
Dept: MRI IMAGING | Facility: HOSPITAL | Age: 52
Discharge: HOME | End: 2022-10-27
Attending: SPECIALIST | Admitting: SPECIALIST
Payer: COMMERCIAL

## 2022-10-27 DIAGNOSIS — M25.561 ACUTE PAIN OF RIGHT KNEE: ICD-10-CM

## 2022-10-27 PROCEDURE — 73721 MRI JNT OF LWR EXTRE W/O DYE: CPT | Mod: RT

## 2022-11-07 ENCOUNTER — TRANSFERRED RECORDS (OUTPATIENT)
Dept: HEALTH INFORMATION MANAGEMENT | Facility: CLINIC | Age: 52
End: 2022-11-07

## 2022-11-18 ENCOUNTER — PREP FOR PROCEDURE (OUTPATIENT)
Dept: MEDSURG UNIT | Facility: HOSPITAL | Age: 52
End: 2022-11-18

## 2022-11-18 DIAGNOSIS — S83.241A TEAR OF MEDIAL MENISCUS OF RIGHT KNEE, UNSPECIFIED TEAR TYPE, UNSPECIFIED WHETHER OLD OR CURRENT TEAR, INITIAL ENCOUNTER: Primary | ICD-10-CM

## 2022-11-18 RX ORDER — CEFAZOLIN SODIUM IN 0.9 % NACL 3 G/100 ML
3 INTRAVENOUS SOLUTION, PIGGYBACK (ML) INTRAVENOUS SEE ADMIN INSTRUCTIONS
Status: CANCELLED | OUTPATIENT
Start: 2022-11-18

## 2022-11-18 RX ORDER — CEFAZOLIN SODIUM IN 0.9 % NACL 3 G/100 ML
3 INTRAVENOUS SOLUTION, PIGGYBACK (ML) INTRAVENOUS
Status: CANCELLED | OUTPATIENT
Start: 2022-11-18

## 2022-11-22 DIAGNOSIS — J30.2 CHRONIC SEASONAL ALLERGIC RHINITIS: ICD-10-CM

## 2022-11-23 RX ORDER — CETIRIZINE HYDROCHLORIDE 10 MG/1
TABLET ORAL
Qty: 90 TABLET | Refills: 1 | Status: SHIPPED | OUTPATIENT
Start: 2022-11-23 | End: 2023-02-27

## 2023-01-25 ENCOUNTER — OFFICE VISIT (OUTPATIENT)
Dept: FAMILY MEDICINE | Facility: OTHER | Age: 53
End: 2023-01-25
Attending: NURSE PRACTITIONER
Payer: COMMERCIAL

## 2023-01-25 VITALS
OXYGEN SATURATION: 96 % | DIASTOLIC BLOOD PRESSURE: 72 MMHG | WEIGHT: 315 LBS | TEMPERATURE: 97 F | BODY MASS INDEX: 42.73 KG/M2 | HEART RATE: 89 BPM | SYSTOLIC BLOOD PRESSURE: 124 MMHG

## 2023-01-25 DIAGNOSIS — S83.241D ACUTE MEDIAL MENISCUS TEAR OF RIGHT KNEE, SUBSEQUENT ENCOUNTER: ICD-10-CM

## 2023-01-25 DIAGNOSIS — E11.9 TYPE 2 DIABETES MELLITUS WITHOUT COMPLICATION, WITHOUT LONG-TERM CURRENT USE OF INSULIN (H): ICD-10-CM

## 2023-01-25 DIAGNOSIS — Z01.818 PRE-OP EXAM: Primary | ICD-10-CM

## 2023-01-25 LAB
BASOPHILS # BLD AUTO: 0.1 10E3/UL (ref 0–0.2)
BASOPHILS NFR BLD AUTO: 1 %
EOSINOPHIL # BLD AUTO: 0.3 10E3/UL (ref 0–0.7)
EOSINOPHIL NFR BLD AUTO: 3 %
ERYTHROCYTE [DISTWIDTH] IN BLOOD BY AUTOMATED COUNT: 12.5 % (ref 10–15)
HCT VFR BLD AUTO: 45.7 % (ref 40–53)
HGB BLD-MCNC: 16.4 G/DL (ref 13.3–17.7)
LYMPHOCYTES # BLD AUTO: 1.7 10E3/UL (ref 0.8–5.3)
LYMPHOCYTES NFR BLD AUTO: 17 %
MCH RBC QN AUTO: 30.9 PG (ref 26.5–33)
MCHC RBC AUTO-ENTMCNC: 35.9 G/DL (ref 31.5–36.5)
MCV RBC AUTO: 86 FL (ref 78–100)
MONOCYTES # BLD AUTO: 0.7 10E3/UL (ref 0–1.3)
MONOCYTES NFR BLD AUTO: 7 %
NEUTROPHILS # BLD AUTO: 7.1 10E3/UL (ref 1.6–8.3)
NEUTROPHILS NFR BLD AUTO: 72 %
PLATELET # BLD AUTO: 311 10E3/UL (ref 150–450)
RBC # BLD AUTO: 5.31 10E6/UL (ref 4.4–5.9)
WBC # BLD AUTO: 9.8 10E3/UL (ref 4–11)

## 2023-01-25 PROCEDURE — 36415 COLL VENOUS BLD VENIPUNCTURE: CPT | Performed by: NURSE PRACTITIONER

## 2023-01-25 PROCEDURE — 83036 HEMOGLOBIN GLYCOSYLATED A1C: CPT | Performed by: NURSE PRACTITIONER

## 2023-01-25 PROCEDURE — 93000 ELECTROCARDIOGRAM COMPLETE: CPT | Performed by: INTERNAL MEDICINE

## 2023-01-25 PROCEDURE — 85025 COMPLETE CBC W/AUTO DIFF WBC: CPT | Performed by: NURSE PRACTITIONER

## 2023-01-25 PROCEDURE — 99214 OFFICE O/P EST MOD 30 MIN: CPT | Performed by: NURSE PRACTITIONER

## 2023-01-25 PROCEDURE — 80053 COMPREHEN METABOLIC PANEL: CPT | Performed by: NURSE PRACTITIONER

## 2023-01-25 ASSESSMENT — ANXIETY QUESTIONNAIRES
IF YOU CHECKED OFF ANY PROBLEMS ON THIS QUESTIONNAIRE, HOW DIFFICULT HAVE THESE PROBLEMS MADE IT FOR YOU TO DO YOUR WORK, TAKE CARE OF THINGS AT HOME, OR GET ALONG WITH OTHER PEOPLE: NOT DIFFICULT AT ALL
5. BEING SO RESTLESS THAT IT IS HARD TO SIT STILL: NOT AT ALL
GAD7 TOTAL SCORE: 0
GAD7 TOTAL SCORE: 0
1. FEELING NERVOUS, ANXIOUS, OR ON EDGE: NOT AT ALL
7. FEELING AFRAID AS IF SOMETHING AWFUL MIGHT HAPPEN: NOT AT ALL
6. BECOMING EASILY ANNOYED OR IRRITABLE: NOT AT ALL
2. NOT BEING ABLE TO STOP OR CONTROL WORRYING: NOT AT ALL
3. WORRYING TOO MUCH ABOUT DIFFERENT THINGS: NOT AT ALL

## 2023-01-25 ASSESSMENT — PAIN SCALES - GENERAL: PAINLEVEL: MILD PAIN (2)

## 2023-01-25 ASSESSMENT — PATIENT HEALTH QUESTIONNAIRE - PHQ9
SUM OF ALL RESPONSES TO PHQ QUESTIONS 1-9: 0
5. POOR APPETITE OR OVEREATING: NOT AT ALL

## 2023-01-25 NOTE — PROGRESS NOTES
[facility]  [address]  [facility] [address]  Phone: [phone]  Primary Provider: Samira Natarajan  Pre-op Performing Provider: SAMIRA NATARAJAN        PREOPERATIVE EVALUATION:  Today's date: 1/25/2023      Jason Duncan is a 52 year old male who presents for a preoperative evaluation.      Surgical Information:  Surgery/Procedure: Debridement, right knee, arthroscopic, partial medial menisectomy  Surgery Location: [facility]  Surgeon: Dr. Hampton  Surgery Date: 02/03/2023  Time of Surgery: TBD  Where patient plans to recover: At home with family  Fax number for surgical facility: Note does not need to be faxed, will be available electronically in Epic.  Type of Anesthesia Anticipated: to be determined        HPI related to upcoming procedure:     Right knee meniscal tear        Exam:MR KNEE RIGHT W/O CONTRAST     History:52 years Male  right knee pain, mechanical symptoms. Locking  sensation and sensation of instability. Symptoms started one and half  months ago. Injury sustained playing basketball.     Comparisons: Plain films 10/7/2022, MRI 2/13/2017.     Technique: Sagittal and PD fat sat, sagittal T2, coronal PD fat-sat,   coronal T1, axial PD fat-sat and axial PD imaging of the right knee  was performed.     Findings:     Fluid: A small joint effusion is present. There is a small popliteal  cyst with 7 mm bilaterally.     Medial Compartment:          Meniscus: There is a radially oriented tear of the posterior  horn extending from the inner margin to the peripheral third. There is  peripheral extrusion of the midportion from the joint line. This tear  has developed since the prior study          Cartilage: There is cartilage thinning and cartilage signal  heterogeneity. Severe degenerative chondromalacia is worsened since  the prior study. There is marginal osteophytic change which has also  worsened.     Lateral Compartment:         Meniscus: The lateral meniscus is intact.          Cartilage: There is cartilage thinning and cartilage signal  heterogeneity. Severity of chondromalacia has worsened since prior  study. There is worsening marginal osteophytic change. A central  osteophyte is now present over the posterior weightbearing aspect of  the femoral condyle, the site of a full-thickness cartilage defect  seen previously.     Patellofemoral Compartment:           There is cartilage thinning and cartilage signal  heterogeneity with marginal osteophytic change. Severity of  osteoarthritic change/chondromalacia has worsened since the prior  study     Ligaments:           The extensor mechanism is intact. The anterior and posterior  cruciate ligaments are intact. The medial collateral ligament and  lateral collateral complex are intact.     Soft tissues:          Unremarkable     Osseous:          No bone marrow signal abnormality is present to suggest  contusion or fracture.                                                                      Impression: Tricompartmental chondromalacia and osteoarthritic change  with interval worsening from the prior study.     Interval development of radially oriented tear of the posterior horn  of the medial meniscus.     ED MEDINA MD               Preop Questions 1/25/2023   1. Have you ever had a heart attack or stroke? No   2. Have you ever had surgery on your heart or blood vessels, such as a stent placement, a coronary artery bypass, or surgery on an artery in your head, neck, heart, or legs? No   3. Do you have chest pain with activity? No   4. Do you have a history of  heart failure? No   5. Do you currently have a cold, bronchitis or symptoms of other infection? No   6. Do you have a cough, shortness of breath, or wheezing? No   7. Do you or anyone in your family have previous history of blood clots? No   8. Do you or does anyone in your family have a serious bleeding problem such as prolonged bleeding following surgeries or cuts? No    9. Have you ever had problems with anemia or been told to take iron pills? No   10. Have you had any abnormal blood loss such as black, tarry or bloody stools? No   11. Have you ever had a blood transfusion? No   12. Are you willing to have a blood transfusion if it is medically needed before, during, or after your surgery? Yes   13. Have you or any of your relatives ever had problems with anesthesia? No   14. Do you have sleep apnea, excessive snoring or daytime drowsiness? YES - sleep apnea; uses CPAP    14a. Do you have a CPAP machine? Yes   15. Do you have any artifical heart valves or other implanted medical devices like a pacemaker, defibrillator, or continuous glucose monitor? No   16. Do you have artificial joints? No   17. Are you allergic to latex? No         Health Care Directive:  Patient does not have a Health Care Directive or Living Will: Patient states has Advance Directive and will bring in a copy to clinic.           Preoperative Review of :   reviewed - no record of controlled substances prescribed.        Status of Chronic Conditions:  See problem list for active medical problems.  Problems all longstanding and stable, except as noted/documented.  See ROS for pertinent symptoms related to these conditions.        Review of Systems  CONSTITUTIONAL: NEGATIVE for fever, chills, change in weight  INTEGUMENTARY/SKIN: NEGATIVE for worrisome rashes, moles or lesions  EYES: NEGATIVE for vision changes or irritation  ENT/MOUTH: NEGATIVE for ear, mouth and throat problems  RESP: NEGATIVE for significant cough or SOB  CV: NEGATIVE for chest pain, palpitations or peripheral edema  GI: NEGATIVE for nausea, abdominal pain, heartburn, or change in bowel habits  : NEGATIVE for frequency, dysuria, or hematuria  MUSCULOSKELETAL: NEGATIVE for significant arthralgias or myalgia  NEURO: NEGATIVE for weakness, dizziness or paresthesias  ENDOCRINE: NEGATIVE for temperature intolerance, skin/hair  changes  HEME: NEGATIVE for bleeding problems  PSYCHIATRIC: NEGATIVE for changes in mood or affect         Patient Active Problem List    Diagnosis Date Noted     Primary osteoarthritis of left knee 02/21/2020     Priority: Medium     Hyperlipidemia LDL goal <100 09/16/2019     Priority: Medium     Primary insomnia 09/16/2019     Priority: Medium     Chronic gout of multiple sites 09/16/2019     Priority: Medium     Type 2 diabetes mellitus without complication, without long-term current use of insulin (H) 07/10/2018     Priority: Medium     Vitamin D deficiency 04/17/2018     Priority: Medium     RACHELE (obstructive sleep apnea) 05/30/2017     Priority: Medium     Morbid obesity due to excess calories (H) 09/07/2016     Priority: Medium     Encounter for monitoring statin therapy 06/28/2016     Priority: Medium     Advance Care Planning 6/28/2016: ACP Review of Chart / Resources Provided:  Reviewed chart for advance care plan.  Jason Duncan has no plan or code status on file. Discussed available resources and provided with information. Confirmed code status reflects current choices pending further ACP discussions.  Confirmed/documented legally designated decision makers.  Added by Phyllis Olvera             Gastric band slippage 09/01/2015     Priority: Medium     Mixed hearing loss, unilateral 11/20/2013     Priority: Medium     Status post bariatric surgery 09/25/2008     Priority: Medium     Benign essential hypertension 06/09/2008     Priority: Medium            Past Medical History:   Diagnosis Date     Allergic rhinitis      Benign essential hypertension 6/9/2008     Problem list name updated by automated process. Provider to review     Chronic gout of multiple sites 9/16/2019     Cramp of limb 4/17/2018     Hyperlipidemia LDL goal <100 9/16/2019     RACHLEE (obstructive sleep apnea)      Primary insomnia 9/16/2019     Primary osteoarthritis of left knee 2/21/2020     Type 2 diabetes mellitus without complication,  without long-term current use of insulin (H) 7/10/2018     Vitamin D deficiency 4/17/2018       Past Surgical History:   Procedure Laterality Date     ARTHROSCOPY KNEE Left 3/30/2021    Procedure: LEFT KNEE ARTHROSCOPY:PARTIAL MEDIAL MENISECTOMY, PARTIAL LATERAL MENISECTOMY;  Surgeon: Donny Au MD;  Location: [facility] OR     GASTRIC RESTRICTIVE PROCEDURE, OPEN, REMOVE/REPLACE SUBCUTANEOUS PORT  2008     GI SURGERY  2015    removal of lap band      lap band  2008     nasal septoplasty       SEPTOPLASTY      Nasal         Current Outpatient Medications   Medication Sig Dispense Refill     amLODIPine (NORVASC) 10 MG tablet Take 1 tablet (10 mg) by mouth daily 90 tablet 1     aspirin 81 MG tablet Take by mouth daily       cetirizine (ZYRTEC) 10 MG tablet TAKE 1 TABLET BY MOUTH EVERY EVENING 90 tablet 1     cyclobenzaprine (FLEXERIL) 10 MG tablet TAKE 1 TABLET BY MOUTH NIGHTLY AS NEEDED FOR MUSCLE SPASMS 90 tablet 3     indomethacin (INDOCIN) 25 MG capsule Take 1 capsule (25 mg) by mouth 3 times daily as needed for moderate pain 30 capsule 0     lisinopril (ZESTRIL) 10 MG tablet TAKE 1 AND 1/2 TABLETS (15MG) BY MOUTH DAILY 135 tablet 1     loratadine (CLARITIN) 10 MG tablet TAKE 1 TABLET BY MOUTH DAILY 90 tablet 1     metFORMIN (GLUCOPHAGE XR) 500 MG 24 hr tablet TAKE 2 TABLETS BY MOUTH 2 TIMES DAILY WITH MEALS 360 tablet 1     methocarbamol (ROBAXIN) 750 MG tablet TAKE 1 TABLET BY MOUTH 3 TIMES DAILY AS NEEDED FOR MUSCLE SPASMS 360 tablet 1     montelukast (SINGULAIR) 10 MG tablet TAKE 1 TABLET BY MOUTH DAILY AT BEDTIME 90 tablet 3     Multiple Vitamins-Minerals (ZINC PO)        multivitamin w/minerals (THERA-VIT-M) tablet Take 1 tablet by mouth daily       order for DME c-pap mask and supplies    DX: G47.33, sleep apnea      RACHELE - uses CPAP  Stable, does well with CPAP  Is due for annual supply renewal  Length of need:  Lifetime  Face to face visit - 2/21/2020 1 Device 11     simvastatin (ZOCOR) 40 MG tablet TAKE 1  TABLET BY MOUTH DAILY AT BEDTIME 90 tablet 1     sitagliptin (JANUVIA) 100 MG tablet Take 1 tablet (100 mg) by mouth daily 90 tablet 1     VITAMIN D, CHOLECALCIFEROL, PO Take 5,000 Units by mouth daily            No Known Allergies         Social History     Tobacco Use     Smoking status: Never     Smokeless tobacco: Never   Substance Use Topics     Alcohol use: Yes     Comment: rarely         Objective     /72 (BP Location: Left arm, Patient Position: Sitting, Cuff Size: Adult Large)   Pulse 89   Temp 97  F (36.1  C) (Tympanic)   Wt (!) 155.1 kg (341 lb 14.4 oz)   SpO2 96%   BMI 42.73 kg/m          Physical Exam    GENERAL APPEARANCE: healthy, alert and no distress     EYES: EOMI,  PERRL     HENT: ear canals and TM's normal and nose and mouth without ulcers or lesions     NECK: no adenopathy, no asymmetry, masses, or scars and thyroid normal to palpation     RESP: lungs clear to auscultation - no rales, rhonchi or wheezes     CV: regular rates and rhythm, normal S1 S2, no S3 or S4 and no murmur, click or rub     ABDOMEN:  soft, nontender, no HSM or masses and bowel sounds normal     MS: extremities normal- no gross deformities noted, no evidence of inflammation in joints, FROM in all extremities.     SKIN: no suspicious lesions or rashes     NEURO: Normal strength and tone, sensory exam grossly normal, mentation intact and speech normal     PSYCH: mentation appears normal. and affect normal/bright     LYMPHATICS: No cervical adenopathy          Recent Labs   Lab Test 10/07/22  1005 05/06/22  0816 03/11/22  1234 03/11/22  1234 03/22/21  1410   HGB  --   --   --  15.6 15.6   PLT  --   --   --  277 311    133   < > 132* 138   POTASSIUM 3.9 4.0   < > 3.7 3.8   CR 1.01 0.88   < > 1.02 1.04   A1C 7.6* 10.4*  --   --  6.5*    < > = values in this interval not displayed.        Diagnostics:  Recent Results (from the past 48 hour(s))   Comprehensive metabolic panel    Collection Time: 01/25/23  3:21 PM    Result Value Ref Range    Sodium 139 136 - 145 mmol/L    Potassium 3.9 3.4 - 5.3 mmol/L    Chloride 103 98 - 107 mmol/L    Carbon Dioxide (CO2) 21 (L) 22 - 29 mmol/L    Anion Gap 15 7 - 15 mmol/L    Urea Nitrogen 17.9 6.0 - 20.0 mg/dL    Creatinine 1.01 0.67 - 1.17 mg/dL    Calcium 10.0 8.6 - 10.0 mg/dL    Glucose 157 (H) 70 - 99 mg/dL    Alkaline Phosphatase 67 40 - 129 U/L     (H) 10 - 50 U/L     (H) 10 - 50 U/L    Protein Total 7.6 6.4 - 8.3 g/dL    Albumin 4.5 3.5 - 5.2 g/dL    Bilirubin Total 0.4 <=1.2 mg/dL    GFR Estimate >90 >60 mL/min/1.73m2   Hemoglobin A1c    Collection Time: 01/25/23  3:21 PM   Result Value Ref Range    Estimated Average Glucose 209 mg/dL    Hemoglobin A1C 8.9 (H) <5.7 %   CBC with platelets and differential    Collection Time: 01/25/23  3:21 PM   Result Value Ref Range    WBC Count 9.8 4.0 - 11.0 10e3/uL    RBC Count 5.31 4.40 - 5.90 10e6/uL    Hemoglobin 16.4 13.3 - 17.7 g/dL    Hematocrit 45.7 40.0 - 53.0 %    MCV 86 78 - 100 fL    MCH 30.9 26.5 - 33.0 pg    MCHC 35.9 31.5 - 36.5 g/dL    RDW 12.5 10.0 - 15.0 %    Platelet Count 311 150 - 450 10e3/uL    % Neutrophils 72 %    % Lymphocytes 17 %    % Monocytes 7 %    % Eosinophils 3 %    % Basophils 1 %    Absolute Neutrophils 7.1 1.6 - 8.3 10e3/uL    Absolute Lymphocytes 1.7 0.8 - 5.3 10e3/uL    Absolute Monocytes 0.7 0.0 - 1.3 10e3/uL    Absolute Eosinophils 0.3 0.0 - 0.7 10e3/uL    Absolute Basophils 0.1 0.0 - 0.2 10e3/uL            EKG: appears normal, NSR, normal axis, normal intervals, no acute ST/T changes c/w ischemia, no LVH by voltage criteria, unchanged from previous tracings        Revised Cardiac Risk Index (RCRI):  The patient has the following serious cardiovascular risks for perioperative complications:   - No serious cardiac risks = 0 points     RCRI Interpretation: 0 points: Class I (very low risk - 0.4% complication rate)        Assessment & Plan       The proposed surgical procedure is considered LOW  risk.      Pre-op exam  - EKG 12-lead complete w/read - (Clinic Performed)  - Comprehensive metabolic panel  - CBC with platelets and differential  - Hemoglobin A1c           Risks and Recommendations:  The patient has the following additional risks and recommendations for perioperative complications:   - No identified additional risk factors other than previously addressed        Medication Instructions:   - aspirin: Discontinue aspirin 7-10 days prior to procedure to reduce bleeding risk. It should be resumed postoperatively.    - Beta Blockers: Continue taking on the day of surgery.   - Calcium Channel Blockers: May be continued on the day of surgery.   - Statins: Continue taking on the day of surgery.    - metformin: HOLD day of surgery.   - DPP-4 Inhibitor (e.g. sitagliptin [Januvia], linagliptin [Tradjenta]): Continue without modification.         RECOMMENDATION:  APPROVAL GIVEN to proceed with proposed procedure, without further diagnostic evaluation.        Patient is seen in conjunction with NP student.  History is reviewed with patient and pertinent portions of the exam are repeated.  Assessment and plan is reviewed with the patient.    Jonna Diallo, ELAN Student        Signed Electronically by: Samira Whitney CNP  Copy of this evaluation report is provided to requesting physician.

## 2023-01-25 NOTE — PATIENT INSTRUCTIONS
For informational purposes only. Not to replace the advice of your health care provider. Copyright   2003,  [facility]. All rights reserved. Clinically reviewed by Laurence De La Garza MD. [employer] [id number] - REV .  Preparing for Your Surgery  Getting started  A nurse will call you to review your health history and instructions. They will give you an arrival time based on your scheduled surgery time. Please be ready to share:    Your doctor's clinic name and phone number    Your medical, surgical, and anesthesia history    A list of allergies and sensitivities    A list of medicines, including herbal treatments and over-the-counter drugs    Whether the patient has a legal guardian (ask how to send us the papers in advance)  Please tell us if you're pregnant--or if there's any chance you might be pregnant. Some surgeries may injure a fetus (unborn baby), so they require a pregnancy test. Surgeries that are safe for a fetus don't always need a test, and you can choose whether to have one.   If you have a child who's having surgery, please ask for a copy of Preparing for Your Child's Surgery.    Preparing for surgery    Within 10 to 30 days of surgery: Have a pre-op exam (sometimes called an H&P, or History and Physical). This can be done at a clinic or pre-operative center.  ? If you're having a , you may not need this exam. Talk to your care team.    At your pre-op exam, talk to your care team about all medicines you take. If you need to stop any medicines before surgery, ask when to start taking them again.  ? We do this for your safety. Many medicines can make you bleed too much during surgery. Some change how well surgery (anesthesia) drugs work.    Call your insurance company to let them know you're having surgery. (If you don't have insurance, call [phone].)    Call your clinic if there's any change in your health. This includes signs of a cold or flu (sore throat, runny nose,  cough, rash, fever). It also includes a scrape or scratch near the surgery site.    If you have questions on the day of surgery, call your hospital or surgery center.  Eating and drinking guidelines  For your safety: Unless your surgeon tells you otherwise, follow the guidelines below.    Eat and drink as usual until 8 hours before you arrive for surgery. After that, no food or milk.    Drink clear liquids until 2 hours before you arrive. These are liquids you can see through, like water, Gatorade, and Propel Water. They also include plain black coffee and tea (no cream or milk), candy, and breath mints. You can spit out gum when you arrive.    If you drink alcohol: Stop drinking it the night before surgery.    If your care team tells you to take medicine on the morning of surgery, it's okay to take it with a sip of water.  Preventing infection    Shower or bathe the night before and morning of your surgery. Follow the instructions your clinic gave you. (If no instructions, use regular soap.)    Don't shave or clip hair near your surgery site. We'll remove the hair if needed.    Don't smoke or vape the morning of surgery. You may chew nicotine gum up to 2 hours before surgery. A nicotine patch is okay.  ? Note: Some surgeries require you to completely quit smoking and nicotine. Check with your surgeon.    Your care team will make every effort to keep you safe from infection. We will:  ? Clean our hands often with soap and water (or an alcohol-based hand rub).  ? Clean the skin at your surgery site with a special soap that kills germs.  ? Give you a special gown to keep you warm. (Cold raises the risk of infection.)  ? Wear special hair covers, masks, gowns and gloves during surgery.  ? Give antibiotic medicine, if prescribed. Not all surgeries need antibiotics.  What to bring on the day of surgery    Photo ID and insurance card    Copy of your health care directive, if you have one    Glasses and hearing aids (bring  cases)  ? You can't wear contacts during surgery    Inhaler and eye drops, if you use them (tell us about these when you arrive)    CPAP machine or breathing device, if you use them    A few personal items, if spending the night    If you have . . .  ? A pacemaker, ICD (cardiac defibrillator) or other implant: Bring the ID card.  ? An implanted stimulator: Bring the remote control.  ? A legal guardian: Bring a copy of the certified (court-stamped) guardianship papers.  Please remove any jewelry, including body piercings. Leave jewelry and other valuables at home.  If you're going home the day of surgery    You must have a responsible adult drive you home. They should stay with you overnight as well.    If you don't have someone to stay with you, and you aren't safe to go home alone, we may keep you overnight. Insurance often won't pay for this.  After surgery  If it's hard to control your pain or you need more pain medicine, please call your surgeon's office.  Questions?   If you have any questions for your care team, list them here: _________________________________________________________________________________________________________________________________________________________________________ ____________________________________ ____________________________________ ____________________________________

## 2023-01-26 ENCOUNTER — ANESTHESIA EVENT (OUTPATIENT)
Dept: SURGERY | Facility: HOSPITAL | Age: 53
End: 2023-01-26
Payer: COMMERCIAL

## 2023-01-26 LAB
ALBUMIN SERPL BCG-MCNC: 4.5 G/DL (ref 3.5–5.2)
ALP SERPL-CCNC: 67 U/L (ref 40–129)
ALT SERPL W P-5'-P-CCNC: 163 U/L (ref 10–50)
ANION GAP SERPL CALCULATED.3IONS-SCNC: 15 MMOL/L (ref 7–15)
AST SERPL W P-5'-P-CCNC: 113 U/L (ref 10–50)
BILIRUB SERPL-MCNC: 0.4 MG/DL
BUN SERPL-MCNC: 17.9 MG/DL (ref 6–20)
CALCIUM SERPL-MCNC: 10 MG/DL (ref 8.6–10)
CHLORIDE SERPL-SCNC: 103 MMOL/L (ref 98–107)
CREAT SERPL-MCNC: 1.01 MG/DL (ref 0.67–1.17)
DEPRECATED HCO3 PLAS-SCNC: 21 MMOL/L (ref 22–29)
EST. AVERAGE GLUCOSE BLD GHB EST-MCNC: 209 MG/DL
GFR SERPL CREATININE-BSD FRML MDRD: >90 ML/MIN/1.73M2
GLUCOSE SERPL-MCNC: 157 MG/DL (ref 70–99)
HBA1C MFR BLD: 8.9 %
POTASSIUM SERPL-SCNC: 3.9 MMOL/L (ref 3.4–5.3)
PROT SERPL-MCNC: 7.6 G/DL (ref 6.4–8.3)
SODIUM SERPL-SCNC: 139 MMOL/L (ref 136–145)

## 2023-01-26 NOTE — ANESTHESIA PREPROCEDURE EVALUATION
Anesthesia Pre-Procedure Evaluation    Patient: Jason Duncan   MRN: [id number] : 1970        Procedure : Procedure(s):  DEBRIDEMENT,Right  KNEE, ARTHROSCOPIC, partial medial menisectomy          Past Medical History:   Diagnosis Date     Allergic rhinitis      Benign essential hypertension 2008     Problem list name updated by automated process. Provider to review     Chronic gout of multiple sites 2019     Cramp of limb 2018     Hyperlipidemia LDL goal <100 2019     RACHELE (obstructive sleep apnea)      Primary insomnia 2019     Primary osteoarthritis of left knee 2020     Type 2 diabetes mellitus without complication, without long-term current use of insulin (H) 7/10/2018     Vitamin D deficiency 2018      Past Surgical History:   Procedure Laterality Date     ARTHROSCOPY KNEE Left 3/30/2021    Procedure: LEFT KNEE ARTHROSCOPY:PARTIAL MEDIAL MENISECTOMY, PARTIAL LATERAL MENISECTOMY;  Surgeon: Donny Au MD;  Location: [facility]     GASTRIC RESTRICTIVE PROCEDURE, OPEN, REMOVE/REPLACE SUBCUTANEOUS PORT       GI SURGERY      removal of lap band      lap band       nasal septoplasty       SEPTOPLASTY      Nasal      No Known Allergies   Social History     Tobacco Use     Smoking status: Never     Smokeless tobacco: Never   Substance Use Topics     Alcohol use: Yes     Comment: rarely      Wt Readings from Last 1 Encounters:   23 (!) 155.1 kg (341 lb 14.4 oz)        Anesthesia Evaluation   Pt has had prior anesthetic. Type: General.    No history of anesthetic complications       ROS/MED HX  ENT/Pulmonary: Comment: S/p septoplasty    (+) sleep apnea, uses CPAP, allergic rhinitis,     Neurologic: Comment: Hearing loss      Cardiovascular:     (+) Dyslipidemia hypertension-----Previous cardiac testing   Echo: Date: Results:    Stress Test: Date: Results:    ECG Reviewed: Date: hp Results:  Appears normal, NSR, normal axis, normal intervals, no acute ST/T changes  c/w ischemia, no LVH by voltage criteria, unchanged from previous tracings  Cath: Date: Results:      METS/Exercise Tolerance: >4 METS    Hematologic:  - neg hematologic  ROS     Musculoskeletal: Comment: Right knee no pain today. Can get up to 7/10 per patient  (+) arthritis (oa, gout),     GI/Hepatic: Comment: S/p lap band      Renal/Genitourinary:  - neg Renal ROS     Endo: Comment: Vitamin D deficiency    (+) type II DM, Last HgA1c: 8.9, date: 1/26/2023, Not using insulin, Obesity,     Psychiatric/Substance Use:     (+) psychiatric history other (comment) (insomnia)     Infectious Disease:  - neg infectious disease ROS     Malignancy:  - neg malignancy ROS     Other:  - neg other ROS          Physical Exam    Airway        Mallampati: III   TM distance: > 3 FB   Neck ROM: full   Mouth opening: > 3 cm    Respiratory Devices and Support         Dental       (+) Minor Abnormalities - some fillings, tiny chips      Cardiovascular   cardiovascular exam normal       Rhythm and rate: regular and normal     Pulmonary   pulmonary exam normal        breath sounds clear to auscultation           OUTSIDE LABS:  CBC:   Lab Results   Component Value Date    WBC 9.8 01/25/2023    WBC 5.3 03/11/2022    HGB 16.4 01/25/2023    HGB 15.6 03/11/2022    HCT 45.7 01/25/2023    HCT 45.7 03/11/2022     01/25/2023     03/11/2022     BMP:   Lab Results   Component Value Date     10/07/2022     05/06/2022    POTASSIUM 3.9 10/07/2022    POTASSIUM 4.0 05/06/2022    CHLORIDE 105 10/07/2022    CHLORIDE 101 05/06/2022    CO2 27 10/07/2022    CO2 23 05/06/2022    BUN 15 10/07/2022    BUN 14 05/06/2022    CR 1.01 10/07/2022    CR 0.88 05/06/2022     (H) 10/07/2022     (H) 05/06/2022     COAGS: No results found for: PTT, INR, FIBR  POC: No results found for: BGM, HCG, HCGS  HEPATIC:   Lab Results   Component Value Date    ALBUMIN 3.9 10/07/2022    PROTTOTAL 7.9 10/07/2022     (H) 10/07/2022    AST 80  (H) 10/07/2022    ALKPHOS 62 10/07/2022    BILITOTAL 0.8 10/07/2022     OTHER:   Lab Results   Component Value Date    A1C 7.6 (H) 10/07/2022    BRENDON 9.5 10/07/2022    MAG 2.1 07/03/2020    LIPASE 224 03/11/2022    TSH 1.34 10/07/2022    CRP 11.3 (H) 03/11/2022    SED 12 12/02/2013       Anesthesia Plan    ASA Status:  3   NPO Status:  NPO Appropriate    Anesthesia Type: General.     - Airway: LMA   Induction: Intravenous.   Maintenance: Balanced.        Consents    Anesthesia Plan(s) and associated risks, benefits, and realistic alternatives discussed. Questions answered and patient/representative(s) expressed understanding.    - Discussed:     - Discussed with:  Patient         Postoperative Care    Pain management: IV analgesics.   PONV prophylaxis: Dexamethasone or Solumedrol, Ondansetron (or other 5HT-3)     Comments:    Other Comments:  1/25/23     Discussed risks and benefits with patient for general anesthesia including sore throat, nausea, vomiting, aspiration, dental damage, loss of airway, CV complications, stroke, MI, death. Pt wishes to proceed.             RODRIGUEZ Segundo CRNA

## 2023-01-27 DIAGNOSIS — J30.2 SEASONAL ALLERGIC RHINITIS, UNSPECIFIED TRIGGER: ICD-10-CM

## 2023-01-28 ENCOUNTER — HOSPITAL ENCOUNTER (EMERGENCY)
Facility: HOSPITAL | Age: 53
Discharge: HOME | End: 2023-01-28
Attending: NURSE PRACTITIONER | Admitting: NURSE PRACTITIONER
Payer: COMMERCIAL

## 2023-01-28 VITALS
TEMPERATURE: 97.5 F | SYSTOLIC BLOOD PRESSURE: 172 MMHG | HEART RATE: 81 BPM | OXYGEN SATURATION: 97 % | RESPIRATION RATE: 16 BRPM | DIASTOLIC BLOOD PRESSURE: 99 MMHG

## 2023-01-28 DIAGNOSIS — R30.0 DYSURIA: Primary | ICD-10-CM

## 2023-01-28 LAB
ALBUMIN UR-MCNC: 20 MG/DL
APPEARANCE UR: CLEAR
BILIRUB UR QL STRIP: NEGATIVE
COLOR UR AUTO: ABNORMAL
GLUCOSE UR STRIP-MCNC: 300 MG/DL
HGB UR QL STRIP: ABNORMAL
KETONES UR STRIP-MCNC: NEGATIVE MG/DL
LEUKOCYTE ESTERASE UR QL STRIP: NEGATIVE
MUCOUS THREADS #/AREA URNS LPF: PRESENT /LPF
NITRATE UR QL: NEGATIVE
PH UR STRIP: 6 [PH] (ref 4.7–8)
RBC URINE: 3 /HPF
SP GR UR STRIP: 1.02 (ref 1–1.03)
SQUAMOUS EPITHELIAL: 0 /HPF
UROBILINOGEN UR STRIP-MCNC: NORMAL MG/DL
WBC URINE: 3 /HPF

## 2023-01-28 PROCEDURE — 99213 OFFICE O/P EST LOW 20 MIN: CPT | Performed by: NURSE PRACTITIONER

## 2023-01-28 PROCEDURE — G0463 HOSPITAL OUTPT CLINIC VISIT: HCPCS

## 2023-01-28 PROCEDURE — 81001 URINALYSIS AUTO W/SCOPE: CPT | Performed by: NURSE PRACTITIONER

## 2023-01-28 ASSESSMENT — ENCOUNTER SYMPTOMS
VOMITING: 0
FLANK PAIN: 0
PSYCHIATRIC NEGATIVE: 1
ABDOMINAL PAIN: 0
NAUSEA: 0
SHORTNESS OF BREATH: 0
HEMATURIA: 0
CHILLS: 0
FEVER: 0
DYSURIA: 1
DIARRHEA: 0
FREQUENCY: 0

## 2023-01-28 NOTE — ED TRIAGE NOTES
Pt presents with c/o uti sx  Started today.  Painful, frequency, urgency  Denies any blood, chills, fever  No otc meds taken,

## 2023-01-28 NOTE — DISCHARGE INSTRUCTIONS
Push fluids  Follow-up with primary care provider or return to urgent care/ED with any worsening in condition or additional concerns peer

## 2023-01-28 NOTE — ED PROVIDER NOTES
History     Chief Complaint   Patient presents with     Dysuria     HPI  Jason Duncan is a 52 year old male who presents to urgent care today ambulatory with complaints of dysuria which started just prior to arrival.  Denies any abdominal or flank pain.  Denies any frequency or hematuria.  Denies any history of kidney stones.  Denies any risk of STDs.  Denies any genital sores.  Denies any rashes.  Denies any penile pain, discharge or swelling.  Denies any testicular pain or swelling.  Denies any fever, chills, nausea, vomiting, diarrhea, shortness of breath or chest pain.  No other concerns.    Allergies:  No Known Allergies    Problem List:    Patient Active Problem List    Diagnosis Date Noted     Primary osteoarthritis of left knee 02/21/2020     Priority: Medium     Hyperlipidemia LDL goal <100 09/16/2019     Priority: Medium     Primary insomnia 09/16/2019     Priority: Medium     Chronic gout of multiple sites 09/16/2019     Priority: Medium     Type 2 diabetes mellitus without complication, without long-term current use of insulin (H) 07/10/2018     Priority: Medium     Vitamin D deficiency 04/17/2018     Priority: Medium     RACHELE (obstructive sleep apnea) 05/30/2017     Priority: Medium     Morbid obesity due to excess calories (H) 09/07/2016     Priority: Medium     Encounter for monitoring statin therapy 06/28/2016     Priority: Medium     Advance Care Planning 6/28/2016: ACP Review of Chart / Resources Provided:  Reviewed chart for advance care plan.  Jason Duncan has no plan or code status on file. Discussed available resources and provided with information. Confirmed code status reflects current choices pending further ACP discussions.  Confirmed/documented legally designated decision makers.  Added by Phyllis Olvera             Gastric band slippage 09/01/2015     Priority: Medium     Mixed hearing loss, unilateral 11/20/2013     Priority: Medium     Status post bariatric surgery 09/25/2008      Priority: Medium     Benign essential hypertension 06/09/2008     Priority: Medium        Past Medical History:    Past Medical History:   Diagnosis Date     Allergic rhinitis      Benign essential hypertension 6/9/2008     Chronic gout of multiple sites 9/16/2019     Cramp of limb 4/17/2018     Hyperlipidemia LDL goal <100 9/16/2019     RACHELE (obstructive sleep apnea)      Primary insomnia 9/16/2019     Primary osteoarthritis of left knee 2/21/2020     Type 2 diabetes mellitus without complication, without long-term current use of insulin (H) 7/10/2018     Vitamin D deficiency 4/17/2018       Past Surgical History:    Past Surgical History:   Procedure Laterality Date     ARTHROSCOPY KNEE Left 3/30/2021    Procedure: LEFT KNEE ARTHROSCOPY:PARTIAL MEDIAL MENISECTOMY, PARTIAL LATERAL MENISECTOMY;  Surgeon: Donny Au MD;  Location: [facility]     GASTRIC RESTRICTIVE PROCEDURE, OPEN, REMOVE/REPLACE SUBCUTANEOUS PORT  2008     GI SURGERY  2015    removal of lap band      lap band  2008     nasal septoplasty       SEPTOPLASTY      Nasal       Family History:    Family History   Problem Relation Age of Onset     Dementia Mother 72        Cause of death     Diabetes Father      Respiratory Father         COPD     Cardiovascular Father         CHF       Social History:  Marital Status:   [2]  Social History     Tobacco Use     Smoking status: Never     Smokeless tobacco: Never   Substance Use Topics     Alcohol use: Yes     Comment: rarely     Drug use: No        Medications:    amLODIPine (NORVASC) 10 MG tablet  aspirin 81 MG tablet  cetirizine (ZYRTEC) 10 MG tablet  cyclobenzaprine (FLEXERIL) 10 MG tablet  indomethacin (INDOCIN) 25 MG capsule  lisinopril (ZESTRIL) 10 MG tablet  loratadine (CLARITIN) 10 MG tablet  metFORMIN (GLUCOPHAGE XR) 500 MG 24 hr tablet  methocarbamol (ROBAXIN) 750 MG tablet  montelukast (SINGULAIR) 10 MG tablet  Multiple Vitamins-Minerals (ZINC PO)  multivitamin w/minerals (THERA-VIT-M)  tablet  order for DME  simvastatin (ZOCOR) 40 MG tablet  sitagliptin (JANUVIA) 100 MG tablet  VITAMIN D, CHOLECALCIFEROL, PO      Review of Systems   Constitutional: Negative for chills and fever.   Respiratory: Negative for shortness of breath.    Cardiovascular: Negative for chest pain.   Gastrointestinal: Negative for abdominal pain, diarrhea, nausea and vomiting.   Genitourinary: Positive for dysuria. Negative for decreased urine volume, flank pain, frequency, genital sores, hematuria, penile discharge, penile pain, penile swelling, scrotal swelling and testicular pain.   Musculoskeletal: Negative for gait problem.   Skin: Negative for rash.   Psychiatric/Behavioral: Negative.      Physical Exam   BP: 172/99  Pulse: 81  Temp: 97.5  F (36.4  C)  Resp: 16  SpO2: 97 %    Physical Exam  Vitals and nursing note reviewed.   Constitutional:       General: He is not in acute distress.     Appearance: Normal appearance. He is not ill-appearing or toxic-appearing.   Cardiovascular:      Rate and Rhythm: Normal rate and regular rhythm.      Pulses: Normal pulses.      Heart sounds: Normal heart sounds.   Pulmonary:      Effort: Pulmonary effort is normal.      Breath sounds: Normal breath sounds.   Abdominal:      General: Bowel sounds are normal.      Palpations: Abdomen is soft.      Tenderness: There is no abdominal tenderness. There is no right CVA tenderness or left CVA tenderness.   Neurological:      Mental Status: He is alert.   Psychiatric:         Mood and Affect: Mood normal.       ED Course     Results for orders placed or performed during the hospital encounter of 01/28/23 (from the past 24 hour(s))   UA reflex to Microscopic and Culture    Specimen: Urine, Midstream   Result Value Ref Range    Color Urine Light Yellow Colorless, Straw, Light Yellow, Yellow    Appearance Urine Clear Clear    Glucose Urine 300 (A) Negative mg/dL    Bilirubin Urine Negative Negative    Ketones Urine Negative Negative mg/dL     Specific Gravity Urine 1.024 1.003 - 1.035    Blood Urine Trace (A) Negative    pH Urine 6.0 4.7 - 8.0    Protein Albumin Urine 20 (A) Negative mg/dL    Urobilinogen Urine Normal Normal, 2.0 mg/dL    Nitrite Urine Negative Negative    Leukocyte Esterase Urine Negative Negative    Mucus Urine Present (A) None Seen /LPF    RBC Urine 3 (H) <=2 /HPF    WBC Urine 3 <=5 /HPF    Squamous Epithelials Urine 0 <=1 /HPF    Narrative    Urine Culture not indicated       Medications - No data to display    Assessments & Plan (with Medical Decision Making)     I have reviewed the nursing notes.    I have reviewed the findings, diagnosis, plan and need for follow up with the patient.  (R30.0) Dysuria  (primary encounter diagnosis)  Plan:   Patient ambulatory with a nontoxic appearance.  Patient had experienced a large amount of pain with urination one before arrival, while doing UA, patient states pain with urination was a lot less compared to the time before.  UA negative for urinary tract infection.  Denies any STD risk.  No abdominal tenderness or CVA tenderness.  Denies any fever, chills, nausea, vomiting, diarrhea, shortness of breath or chest pain.  Reviewed possible kidney stone given the large amount of pain while urinating once which improved largely while urinating the second time. Patient declines any work-up for kidney stones at this time.  Patient wants to monitor and follow-up as needed.  Patient to return to urgent care/ED with any worsening in condition or additional concerns.  Patient in agreement with treatment plan.    Discharge Medication List as of 1/28/2023  5:53 PM        Final diagnoses:   Dysuria     1/28/2023   [facility]     Alcira Rico NP  01/28/23 [id number]

## 2023-01-30 RX ORDER — LORATADINE 10 MG/1
TABLET ORAL
Qty: 90 TABLET | Refills: 0 | Status: SHIPPED | OUTPATIENT
Start: 2023-01-30 | End: 2023-02-27

## 2023-01-30 NOTE — TELEPHONE ENCOUNTER
loratadine (CLARITIN) 10 MG tablet  Last Written Prescription Date:  1-10-22  Last Fill Quantity: 90,   # refills: 1  Last Office Visit: 1-25-23  Future Office visit:       Routing refill request to provider for review/approval because:

## 2023-02-03 ENCOUNTER — HOSPITAL ENCOUNTER (OUTPATIENT)
Facility: HOSPITAL | Age: 53
Discharge: HOME | End: 2023-02-03
Attending: SPECIALIST | Admitting: SPECIALIST
Payer: COMMERCIAL

## 2023-02-03 ENCOUNTER — ANESTHESIA (OUTPATIENT)
Dept: SURGERY | Facility: HOSPITAL | Age: 53
End: 2023-02-03
Payer: COMMERCIAL

## 2023-02-03 VITALS
BODY MASS INDEX: 39.17 KG/M2 | WEIGHT: 315 LBS | TEMPERATURE: 97.6 F | DIASTOLIC BLOOD PRESSURE: 94 MMHG | SYSTOLIC BLOOD PRESSURE: 131 MMHG | OXYGEN SATURATION: 94 % | RESPIRATION RATE: 16 BRPM | HEART RATE: 86 BPM | HEIGHT: 75 IN

## 2023-02-03 DIAGNOSIS — S83.241A TEAR OF MEDIAL MENISCUS OF RIGHT KNEE, UNSPECIFIED TEAR TYPE, UNSPECIFIED WHETHER OLD OR CURRENT TEAR, INITIAL ENCOUNTER: Primary | ICD-10-CM

## 2023-02-03 LAB — GLUCOSE BLDC GLUCOMTR-MCNC: 155 MG/DL (ref 70–99)

## 2023-02-03 PROCEDURE — 29880 ARTHRS KNE SRG MNISECTMY M&L: CPT | Mod: RT | Performed by: SPECIALIST

## 2023-02-03 PROCEDURE — 250N000009 HC RX 250: Performed by: NURSE ANESTHETIST, CERTIFIED REGISTERED

## 2023-02-03 PROCEDURE — 82962 GLUCOSE BLOOD TEST: CPT

## 2023-02-03 PROCEDURE — 999N000141 HC STATISTIC PRE-PROCEDURE NURSING ASSESSMENT: Performed by: SPECIALIST

## 2023-02-03 PROCEDURE — 710N000012 HC RECOVERY PHASE 2, PER MINUTE: Performed by: SPECIALIST

## 2023-02-03 PROCEDURE — 250N000025 HC SEVOFLURANE, PER MIN: Performed by: SPECIALIST

## 2023-02-03 PROCEDURE — 370N000017 HC ANESTHESIA TECHNICAL FEE, PER MIN: Performed by: SPECIALIST

## 2023-02-03 PROCEDURE — 250N000011 HC RX IP 250 OP 636: Performed by: PHYSICIAN ASSISTANT

## 2023-02-03 PROCEDURE — 360N000076 HC SURGERY LEVEL 3, PER MIN: Performed by: SPECIALIST

## 2023-02-03 PROCEDURE — 258N000003 HC RX IP 258 OP 636: Performed by: NURSE ANESTHETIST, CERTIFIED REGISTERED

## 2023-02-03 PROCEDURE — 710N000010 HC RECOVERY PHASE 1, LEVEL 2, PER MIN: Performed by: SPECIALIST

## 2023-02-03 PROCEDURE — 29880 ARTHRS KNE SRG MNISECTMY M&L: CPT | Performed by: NURSE ANESTHETIST, CERTIFIED REGISTERED

## 2023-02-03 PROCEDURE — 272N000001 HC OR GENERAL SUPPLY STERILE: Performed by: SPECIALIST

## 2023-02-03 PROCEDURE — 250N000011 HC RX IP 250 OP 636: Performed by: NURSE ANESTHETIST, CERTIFIED REGISTERED

## 2023-02-03 PROCEDURE — 250N000009 HC RX 250: Performed by: SPECIALIST

## 2023-02-03 RX ORDER — HYDROMORPHONE HYDROCHLORIDE 1 MG/ML
0.5 INJECTION, SOLUTION INTRAMUSCULAR; INTRAVENOUS; SUBCUTANEOUS EVERY 5 MIN PRN
Status: DISCONTINUED | OUTPATIENT
Start: 2023-02-03 | End: 2023-02-03 | Stop reason: HOSPADM

## 2023-02-03 RX ORDER — SODIUM CHLORIDE, SODIUM LACTATE, POTASSIUM CHLORIDE, CALCIUM CHLORIDE 600; 310; 30; 20 MG/100ML; MG/100ML; MG/100ML; MG/100ML
INJECTION, SOLUTION INTRAVENOUS CONTINUOUS
Status: DISCONTINUED | OUTPATIENT
Start: 2023-02-03 | End: 2023-02-03 | Stop reason: HOSPADM

## 2023-02-03 RX ORDER — NALOXONE HYDROCHLORIDE 0.4 MG/ML
0.4 INJECTION, SOLUTION INTRAMUSCULAR; INTRAVENOUS; SUBCUTANEOUS
Status: DISCONTINUED | OUTPATIENT
Start: 2023-02-03 | End: 2023-02-03 | Stop reason: HOSPADM

## 2023-02-03 RX ORDER — CEFAZOLIN SODIUM/WATER 3 G/30 ML
3 SYRINGE (ML) INTRAVENOUS
Status: COMPLETED | OUTPATIENT
Start: 2023-02-03 | End: 2023-02-03

## 2023-02-03 RX ORDER — HYDROCODONE BITARTRATE AND ACETAMINOPHEN 5; 325 MG/1; MG/1
1 TABLET ORAL
Status: DISCONTINUED | OUTPATIENT
Start: 2023-02-03 | End: 2023-02-03 | Stop reason: HOSPADM

## 2023-02-03 RX ORDER — LIDOCAINE 40 MG/G
CREAM TOPICAL
Status: DISCONTINUED | OUTPATIENT
Start: 2023-02-03 | End: 2023-02-03 | Stop reason: HOSPADM

## 2023-02-03 RX ORDER — FENTANYL CITRATE 50 UG/ML
INJECTION, SOLUTION INTRAMUSCULAR; INTRAVENOUS PRN
Status: DISCONTINUED | OUTPATIENT
Start: 2023-02-03 | End: 2023-02-03

## 2023-02-03 RX ORDER — ONDANSETRON 4 MG/1
4 TABLET, ORALLY DISINTEGRATING ORAL EVERY 30 MIN PRN
Status: DISCONTINUED | OUTPATIENT
Start: 2023-02-03 | End: 2023-02-03 | Stop reason: HOSPADM

## 2023-02-03 RX ORDER — CEFAZOLIN SODIUM/WATER 3 G/30 ML
3 SYRINGE (ML) INTRAVENOUS SEE ADMIN INSTRUCTIONS
Status: DISCONTINUED | OUTPATIENT
Start: 2023-02-03 | End: 2023-02-03 | Stop reason: HOSPADM

## 2023-02-03 RX ORDER — NALOXONE HYDROCHLORIDE 0.4 MG/ML
0.2 INJECTION, SOLUTION INTRAMUSCULAR; INTRAVENOUS; SUBCUTANEOUS
Status: DISCONTINUED | OUTPATIENT
Start: 2023-02-03 | End: 2023-02-03 | Stop reason: HOSPADM

## 2023-02-03 RX ORDER — ONDANSETRON 2 MG/ML
4 INJECTION INTRAMUSCULAR; INTRAVENOUS EVERY 30 MIN PRN
Status: DISCONTINUED | OUTPATIENT
Start: 2023-02-03 | End: 2023-02-03 | Stop reason: HOSPADM

## 2023-02-03 RX ORDER — KETAMINE HYDROCHLORIDE 10 MG/ML
INJECTION INTRAMUSCULAR; INTRAVENOUS PRN
Status: DISCONTINUED | OUTPATIENT
Start: 2023-02-03 | End: 2023-02-03

## 2023-02-03 RX ORDER — EPHEDRINE SULFATE 50 MG/ML
INJECTION, SOLUTION INTRAMUSCULAR; INTRAVENOUS; SUBCUTANEOUS PRN
Status: DISCONTINUED | OUTPATIENT
Start: 2023-02-03 | End: 2023-02-03

## 2023-02-03 RX ORDER — HYDROCODONE BITARTRATE AND ACETAMINOPHEN 5; 325 MG/1; MG/1
1-2 TABLET ORAL EVERY 4 HOURS PRN
Qty: 10 TABLET | Refills: 0 | Status: SHIPPED | OUTPATIENT
Start: 2023-02-03 | End: 2023-02-27

## 2023-02-03 RX ORDER — LIDOCAINE HYDROCHLORIDE 10 MG/ML
INJECTION, SOLUTION INFILTRATION; PERINEURAL PRN
Status: DISCONTINUED | OUTPATIENT
Start: 2023-02-03 | End: 2023-02-03 | Stop reason: HOSPADM

## 2023-02-03 RX ORDER — FENTANYL CITRATE 50 UG/ML
50 INJECTION, SOLUTION INTRAMUSCULAR; INTRAVENOUS EVERY 5 MIN PRN
Status: DISCONTINUED | OUTPATIENT
Start: 2023-02-03 | End: 2023-02-03 | Stop reason: HOSPADM

## 2023-02-03 RX ORDER — DEXAMETHASONE SODIUM PHOSPHATE 10 MG/ML
INJECTION, SOLUTION INTRAMUSCULAR; INTRAVENOUS PRN
Status: DISCONTINUED | OUTPATIENT
Start: 2023-02-03 | End: 2023-02-03

## 2023-02-03 RX ORDER — PROPOFOL 10 MG/ML
INJECTION, EMULSION INTRAVENOUS PRN
Status: DISCONTINUED | OUTPATIENT
Start: 2023-02-03 | End: 2023-02-03

## 2023-02-03 RX ORDER — LIDOCAINE HYDROCHLORIDE 10 MG/ML
INJECTION, SOLUTION EPIDURAL; INFILTRATION; INTRACAUDAL; PERINEURAL
Status: DISCONTINUED
Start: 2023-02-03 | End: 2023-02-03 | Stop reason: HOSPADM

## 2023-02-03 RX ORDER — LABETALOL 20 MG/4 ML (5 MG/ML) INTRAVENOUS SYRINGE
10
Status: DISCONTINUED | OUTPATIENT
Start: 2023-02-03 | End: 2023-02-03 | Stop reason: HOSPADM

## 2023-02-03 RX ORDER — ONDANSETRON 2 MG/ML
INJECTION INTRAMUSCULAR; INTRAVENOUS PRN
Status: DISCONTINUED | OUTPATIENT
Start: 2023-02-03 | End: 2023-02-03

## 2023-02-03 RX ORDER — LIDOCAINE HYDROCHLORIDE 20 MG/ML
INJECTION, SOLUTION INFILTRATION; PERINEURAL PRN
Status: DISCONTINUED | OUTPATIENT
Start: 2023-02-03 | End: 2023-02-03

## 2023-02-03 RX ORDER — HYDRALAZINE HYDROCHLORIDE 20 MG/ML
2.5-5 INJECTION INTRAMUSCULAR; INTRAVENOUS EVERY 10 MIN PRN
Status: DISCONTINUED | OUTPATIENT
Start: 2023-02-03 | End: 2023-02-03 | Stop reason: HOSPADM

## 2023-02-03 RX ADMIN — MIDAZOLAM 2 MG: 1 INJECTION INTRAMUSCULAR; INTRAVENOUS at 10:59

## 2023-02-03 RX ADMIN — PROPOFOL 300 MG: 10 INJECTION, EMULSION INTRAVENOUS at 11:08

## 2023-02-03 RX ADMIN — FENTANYL CITRATE 75 MCG: 50 INJECTION, SOLUTION INTRAMUSCULAR; INTRAVENOUS at 11:05

## 2023-02-03 RX ADMIN — Medication 20 MG: at 11:08

## 2023-02-03 RX ADMIN — Medication 3 G: at 10:57

## 2023-02-03 RX ADMIN — DEXAMETHASONE SODIUM PHOSPHATE 6 MG: 10 INJECTION, SOLUTION INTRAMUSCULAR; INTRAVENOUS at 11:12

## 2023-02-03 RX ADMIN — LIDOCAINE HYDROCHLORIDE 60 MG: 20 INJECTION, SOLUTION INFILTRATION; PERINEURAL at 11:07

## 2023-02-03 RX ADMIN — ONDANSETRON 4 MG: 2 INJECTION INTRAMUSCULAR; INTRAVENOUS at 11:42

## 2023-02-03 RX ADMIN — SODIUM CHLORIDE, POTASSIUM CHLORIDE, SODIUM LACTATE AND CALCIUM CHLORIDE: 600; 310; 30; 20 INJECTION, SOLUTION INTRAVENOUS at 10:41

## 2023-02-03 RX ADMIN — FENTANYL CITRATE 25 MCG: 50 INJECTION, SOLUTION INTRAMUSCULAR; INTRAVENOUS at 11:41

## 2023-02-03 RX ADMIN — Medication 5 MG: at 11:48

## 2023-02-03 ASSESSMENT — ACTIVITIES OF DAILY LIVING (ADL)
ADLS_ACUITY_SCORE: 35
ADLS_ACUITY_SCORE: 35

## 2023-02-03 NOTE — OP NOTE
Operative note dated 2/3/2023    Preoperative diagnosis: Right knee degenerative joint disease medial meniscal tear.    Postoperative diagnosis: Right knee degenerative joint disease medial meniscal tear lateral meniscal tear and loose bodies.  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  Loose cartilaginous bodies, multiple.  Procedure: Right knee arthroscopy arthroscopic chondroplasty of the patellofemoral joint medial femoral condyle lateral femoral condyle.  Partial lateral meniscectomy partial medial meniscectomy.  Loose body removal.    Surgeon: Avinash Hampton MD, JOSÉ  First Assistant: Bienvenido Adrian PA-C  Anesthesia: General anesthesia    Procedure:    Patient was brought to the operating room and positioned supine on the operating table.  Once the patient was under adequate anesthesia the right leg was positioned in the arthroscopic leg gant with a tourniquet placed on the right upper thigh.  The left leg was padded in a well leg gant and the foot of the table was flexed past 90 degrees.  The right leg was prepped and draped in the usual sterile fashion.  The patient received IV antibiotics prior to the start of the procedure.  A timeout was performed to confirm the proper patient surgical site and procedure.  The right leg was then elevated and exsanguinated and the tourniquet was inflated to 300 mmHg.  The inferolateral and inferomedial portal sites were injected with local anesthesia.  The inferolateral portal was created with an 11 blade.  The scope cannula with a blunt trocar was placed into the knee through the inferolateral portal.  An inferomedial portal was also created a diagnostic arthroscopy revealed's several loose cartilaginous bodies.  There is grade III-IV chondromalacia involving the patellofemoral joint and extending into the trochlea.  Suction shaver was used to perform a chondroplasty and remove some of the loose  cartilaginous bodies.  The medial gutter was entered.  There was marginal osteophytes on the medial femoral condyle.  These were trimmed down with a suction shaver.  The medial compartment was entered.  There was a area of grade III-IV chondromalacia extending from anterior to posterior along the medial femoral condyle.  The meniscus for the most part appeared intact.  The very far posterior portion of the meniscus had a small radial tear.  The knee was very tight and it was difficult to access this area.  We used the suction shaver to trim some of this back.  The vast majority of the medial meniscus was intact and stable.  We debrided the notch area.  The ACL and PCL were intact consistent with the patient's preoperative evaluation.  There were some marginal notch osteophytes that were trimmed back with a suction shaver.  There was a loose cartilaginous body in the notch that was freed up and removed.  Lateral compartment was entered.  There was some fraying of the inner margin of the lateral meniscus that was trimmed lightly with a suction shaver the remainder of the lateral meniscus was stable the articular surfaces were assessed there was grade III-IV chondromalacia involving the medial femoral condyle over segment of the weightbearing area and there was some loose flaps of cartilage at the margin that were trimmed lightly with a suction shaver and smoothed out. The lateral gutter was visualized and debrided. Any loose cartilaginous bodies were removed. Thorough lavage of the knee was performed all excess fluid was drained from the knee.  Each of the portal sites were closed with 4-0 nylon suture.  A sterile compression dressing was applied.  The patient tolerated the procedure well and did well postoperatively with no immediate postop complications.

## 2023-02-03 NOTE — OR NURSING
Patient and responsible adult given discharge instructions with no questions regarding instructions. Janee score 20/20. Pain level 0/10.  Discharged from unit via wheelchair. Patient discharged to home with wife. Tolerating solids and fluids. Ice pack sent home with patient

## 2023-02-03 NOTE — DISCHARGE INSTRUCTIONS
"  You have a follow up appointment with Dr. Hampton on February 17, 2023 at 11:15 at [facility].   If you have any questions or concerns, please call the [facility] Triage Line at [phone].    IMPORTANT OBSERVATIONS  Check C-M-S of operative extremity every 4 hours while you are awake for the first 48 hours:    * C: color - should appear normal/pink   * M: motion - able to move toes.   * S: sensation - able to \"feel\": no numbness, tingling  If you experience changes in C-M-S and/or in severe pain, you may remove the elastic bandages and reapply ot - tight enough to provide support for the gauze dressing but loose enough to improve C-M-S. The gauze dressing should NOT be removed when rewrapping the elastic bandage.            Post-Anesthesia Patient Instructions    IMMEDIATELY FOLLOWING SURGERY:  Do not drive or operate machinery for the first twenty four hours after surgery.  Do not make any important decisions for twenty four hours after surgery or while taking narcotic pain medications or sedatives.  If you develop intractable nausea and vomiting or a severe headache please notify your doctor immediately.    FOLLOW-UP:  Please make an appointment with your surgeon as instructed. You do not need to follow up with anesthesia unless specifically instructed to do so.    WOUND CARE INSTRUCTIONS (if applicable):  Keep a dry clean dressing on the anesthesia/puncture wound site if there is drainage.  Once the wound has quit draining you may leave it open to air.  Generally you should leave the bandage intact for twenty four hours unless there is drainage.  If the epidural site drains for more than 36-48 hours please call the anesthesia department.    QUESTIONS?:  Please feel free to call your physician or the hospital  if you have any questions, and they will be happy to assist you.       Post-Anesthesia Patient Instructions    IMMEDIATELY FOLLOWING SURGERY:  Do not drive or operate " machinery for the first twenty four hours after surgery.  Do not make any important decisions for twenty four hours after surgery or while taking narcotic pain medications or sedatives.  If you develop intractable nausea and vomiting or a severe headache please notify your doctor immediately.    FOLLOW-UP:  Please make an appointment with your surgeon as instructed. You do not need to follow up with anesthesia unless specifically instructed to do so.    WOUND CARE INSTRUCTIONS (if applicable):  Keep a dry clean dressing on the anesthesia/puncture wound site if there is drainage.  Once the wound has quit draining you may leave it open to air.  Generally you should leave the bandage intact for twenty four hours unless there is drainage.  If the epidural site drains for more than 36-48 hours please call the anesthesia department.    QUESTIONS?:  Please feel free to call your physician or the hospital  if you have any questions, and they will be happy to assist you.

## 2023-02-03 NOTE — ANESTHESIA POSTPROCEDURE EVALUATION
Patient: Jason Duncan    Procedure: Procedure(s):  Right Knee Arthroscopy, Partial Medial Menisectomy, Extensive Debridement, Loose Body Removal, Chondroplasty       Anesthesia Type:  General    Note:  Disposition: Outpatient   Postop Pain Control: Uneventful            Sign Out: Well controlled pain   PONV: No   Neuro/Psych: Uneventful            Sign Out: Acceptable/Baseline neuro status   Airway/Respiratory: Uneventful            Sign Out: Acceptable/Baseline resp. status   CV/Hemodynamics: Uneventful            Sign Out: Acceptable CV status; No obvious hypovolemia; No obvious fluid overload   Other NRE: NONE   DID A NON-ROUTINE EVENT OCCUR? No           Last vitals:  Vitals Value Taken Time   /74 02/03/23 1230   Temp 99.5  F (37.5  C) 02/03/23 1230   Pulse 80 02/03/23 1235   Resp 18 02/03/23 1235   SpO2 96 % 02/03/23 1236   Vitals shown include unvalidated device data.    Electronically Signed By: RODRIGUEZ EDGAR CRNA  February 3, 2023  12:57 PM

## 2023-02-03 NOTE — ANESTHESIA CARE TRANSFER NOTE
Patient: Jason Duncan    Procedure: Procedure(s):  Right Knee Arthroscopy, Partial Medial Menisectomy, Extensive Debridement, Loose Body Removal, Chondroplasty       Diagnosis: Tear of medial meniscus of right knee, unspecified tear type, unspecified whether old or current tear, initial encounter [S85.856N]  Diagnosis Additional Information: No value filed.    Anesthesia Type:   General     Note:    Oropharynx: spontaneously breathing  Level of Consciousness: drowsy  Oxygen Supplementation: nasal cannula  Level of Supplemental Oxygen (L/min / FiO2): 2  Independent Airway: airway patency satisfactory and stable  Dentition: dentition unchanged  Vital Signs Stable: post-procedure vital signs reviewed and stable  Report to RN Given: handoff report given  Patient transferred to: PACU    Handoff Report: Identifed the Patient, Identified the Reponsible Provider, Reviewed the pertinent medical history, Discussed the surgical course, Reviewed Intra-OP anesthesia mangement and issues during anesthesia, Set expectations for post-procedure period and Allowed opportunity for questions and acknowledgement of understanding      Vitals:  Vitals Value Taken Time   /77 02/03/23 1201   Temp     Pulse 86 02/03/23 1202   Resp 23 02/03/23 1202   SpO2 94 % 02/03/23 1202   Vitals shown include unvalidated device data.    Electronically Signed By: RODRIGUEZ Jacob CRNA  February 3, 2023  12:03 PM

## 2023-02-17 ENCOUNTER — TRANSFERRED RECORDS (OUTPATIENT)
Dept: HEALTH INFORMATION MANAGEMENT | Facility: CLINIC | Age: 53
End: 2023-02-17

## 2023-02-27 ENCOUNTER — OFFICE VISIT (OUTPATIENT)
Dept: FAMILY MEDICINE | Facility: OTHER | Age: 53
End: 2023-02-27
Attending: STUDENT IN AN ORGANIZED HEALTH CARE EDUCATION/TRAINING PROGRAM
Payer: COMMERCIAL

## 2023-02-27 VITALS
TEMPERATURE: 97.7 F | OXYGEN SATURATION: 95 % | SYSTOLIC BLOOD PRESSURE: 132 MMHG | DIASTOLIC BLOOD PRESSURE: 78 MMHG | HEART RATE: 91 BPM | BODY MASS INDEX: 40.43 KG/M2 | HEIGHT: 74 IN | WEIGHT: 315 LBS

## 2023-02-27 DIAGNOSIS — E78.5 HYPERLIPIDEMIA LDL GOAL <100: ICD-10-CM

## 2023-02-27 DIAGNOSIS — E11.9 TYPE 2 DIABETES MELLITUS WITHOUT COMPLICATION, WITHOUT LONG-TERM CURRENT USE OF INSULIN (H): ICD-10-CM

## 2023-02-27 DIAGNOSIS — M19.90 ARTHRITIS: ICD-10-CM

## 2023-02-27 DIAGNOSIS — I10 BENIGN ESSENTIAL HYPERTENSION: ICD-10-CM

## 2023-02-27 DIAGNOSIS — R79.89 ELEVATED LFTS: ICD-10-CM

## 2023-02-27 DIAGNOSIS — L29.9 ITCHING: ICD-10-CM

## 2023-02-27 DIAGNOSIS — M10.00 IDIOPATHIC GOUT, UNSPECIFIED CHRONICITY, UNSPECIFIED SITE: ICD-10-CM

## 2023-02-27 DIAGNOSIS — J30.2 CHRONIC SEASONAL ALLERGIC RHINITIS: ICD-10-CM

## 2023-02-27 DIAGNOSIS — Z76.89 ENCOUNTER TO ESTABLISH CARE: Primary | ICD-10-CM

## 2023-02-27 PROBLEM — M1A.09X0 CHRONIC GOUT OF MULTIPLE SITES: Status: ACTIVE | Noted: 2019-09-16

## 2023-02-27 PROBLEM — G47.33 OSA (OBSTRUCTIVE SLEEP APNEA): Status: ACTIVE | Noted: 2017-05-30

## 2023-02-27 PROCEDURE — 99215 OFFICE O/P EST HI 40 MIN: CPT | Performed by: STUDENT IN AN ORGANIZED HEALTH CARE EDUCATION/TRAINING PROGRAM

## 2023-02-27 RX ORDER — LISINOPRIL 10 MG/1
TABLET ORAL
Qty: 135 TABLET | Refills: 1 | Status: SHIPPED | OUTPATIENT
Start: 2023-02-27 | End: 2023-09-27

## 2023-02-27 RX ORDER — MONTELUKAST SODIUM 10 MG/1
TABLET ORAL
Qty: 90 TABLET | Refills: 1 | Status: SHIPPED | OUTPATIENT
Start: 2023-02-27

## 2023-02-27 RX ORDER — CYCLOBENZAPRINE HCL 10 MG
TABLET ORAL
Qty: 90 TABLET | Refills: 0 | Status: SHIPPED | OUTPATIENT
Start: 2023-02-27 | End: 2023-08-23

## 2023-02-27 RX ORDER — METFORMIN HCL 500 MG
TABLET, EXTENDED RELEASE 24 HR ORAL
Qty: 360 TABLET | Refills: 1 | Status: SHIPPED | OUTPATIENT
Start: 2023-02-27 | End: 2023-11-21

## 2023-02-27 RX ORDER — INDOMETHACIN 25 MG/1
25 CAPSULE ORAL 3 TIMES DAILY PRN
Qty: 90 CAPSULE | Refills: 1 | Status: SHIPPED | OUTPATIENT
Start: 2023-02-27

## 2023-02-27 RX ORDER — AMLODIPINE BESYLATE 10 MG/1
10 TABLET ORAL DAILY
Qty: 90 TABLET | Refills: 1 | Status: SHIPPED | OUTPATIENT
Start: 2023-02-27 | End: 2023-12-28

## 2023-02-27 RX ORDER — CETIRIZINE HYDROCHLORIDE 10 MG/1
10 TABLET ORAL EVERY EVENING
Qty: 90 TABLET | Refills: 1 | Status: SHIPPED | OUTPATIENT
Start: 2023-02-27 | End: 2023-10-17

## 2023-02-27 RX ORDER — SIMVASTATIN 40 MG
TABLET ORAL
Qty: 90 TABLET | Refills: 1 | Status: SHIPPED | OUTPATIENT
Start: 2023-02-27 | End: 2023-06-27 | Stop reason: ALTCHOICE

## 2023-02-27 RX ORDER — LORATADINE 10 MG/1
1 TABLET ORAL DAILY
Qty: 90 TABLET | Refills: 1 | Status: SHIPPED | OUTPATIENT
Start: 2023-02-27 | End: 2024-01-08

## 2023-02-27 ASSESSMENT — PAIN SCALES - GENERAL: PAINLEVEL: NO PAIN (0)

## 2023-02-27 NOTE — PROGRESS NOTES
Assessment & Plan     Encounter to establish care  - Previously followed with Samira Devonte; transferring to [place] for convenience  - [facility] up-to-date  - Needs a number of medication refills  - Med Therapy Management Referral    Type 2 diabetes mellitus without complication, without long-term current use of insulin (H)  Recent A1c uptrending to 8.9 from 7.6.  Discussed ongoing lifestyle management as well as addition of GLP-1 inhibitor for A1c goal <7.  Follow-up in 3 months for problem focused diabetes visit.  - metFORMIN (GLUCOPHAGE XR) 500 MG 24 hr tablet; TAKE 2 TABLETS BY MOUTH 2 TIMES DAILY WITH MEALS  - sitagliptin (JANUVIA) 100 MG tablet; Take 1 tablet (100 mg) by mouth daily  - semaglutide (OZEMPIC) 2 MG/1.5ML SOPN pen; Inject 0.25 mg Subcutaneous every 7 days for 28 days, THEN 0.5 mg every 7 days for 28 days, THEN 1 mg every 7 days for 28 days.  - Med Therapy Management Referral  - Simvastatin/lisinopril/ASA  - Diabetic eye exam up-to-date; due next month  - Non-smoker  - Diabetic foot exam and follow-up  - Consider diabetes Ed referral  - Encouraged home glucose monitoring for more efficient medication titration    Elevated LFTs  Mild chronic transaminitis of unclear etiology.  Per chart review appears like ALT/AST started uptrending about 6 years ago.  Differential includes medication related, undiagnosed fatty liver disease, infectious.  We will start with RUQ ultrasound, plan for transaminitis labs at follow-up appointment, [facility] pharmacy referral for consideration of medication associated hepatotoxicity.  - US Abdomen Limited; Future  - Consider switching Zocor for pravastatin  - Hepatitis panel at follow-up    Benign essential hypertension  Normotensive and asymptomatic.  - amLODIPine (NORVASC) 10 MG tablet; Take 1 tablet (10 mg) by mouth daily  - lisinopril (ZESTRIL) 10 MG tablet; TAKE 1 AND 1/2 TABLETS (15MG) BY MOUTH DAILY  - Reviewed CMP from 1/25/2023    Hyperlipidemia LDL goal <100  At goal on  statin therapy 10/7/2022.  - simvastatin (ZOCOR) 40 MG tablet; TAKE 1 TABLET BY MOUTH DAILY AT BEDTIME    Chronic seasonal allergic rhinitis  Itching  Reports chronic pruritus unspecified etiology.  No associated rash, respiratory symptoms, other hypersensitivities.  Symptoms manageable with antihistamine and leukotriene inhibitor.  Also working up mild transaminitis which may contribute to pruritus although unlikely.  - cetirizine (ZYRTEC) 10 MG tablet; Take 1 tablet (10 mg) by mouth every evening  - montelukast (SINGULAIR) 10 MG tablet; TAKE 1 TABLET BY MOUTH DAILY AT BEDTIME  - Consider allergy testing if symptoms worsen    Arthritis  Request refill.  Mostly for aches and pains of the legs at night; uses as needed.  - cyclobenzaprine (FLEXERIL) 10 MG tablet; TAKE 1 TABLET BY MOUTH NIGHTLY AS NEEDED FOR MUSCLE SPASMS    Idiopathic gout, unspecified chronicity, unspecified site  Reports several flares per year, never has been on prophylactic regimen.  Chart review notable for elevated LFTs of unclear etiology; will work-up transaminitis prior to starting allopurinol.  - indomethacin (INDOCIN) 25 MG capsule; Take 1 capsule (25 mg) by mouth 3 times daily as needed for moderate pain (4-6)  - Prophylactic regimen recommended  - Dietary modifications    50 minutes spent on the date of the encounter doing chart review, history and exam, documentation and further activities per the note       Follow-up pending abdominal ultrasound results otherwise plan on diabetes visit in June.    Ildefonso Lopez MD  [facility] - GLENYS Martinez is a 53 year old accompanied by his self, presenting for the following health issues:  Establish Care (/)      HPI       Establish care  Previously followed with Samira Whitney; Transferring to [place] for convenience  Healthcare maintenance up-to-date  Works as a ; [id number]  Needs multiple med refills    Diabetes Follow-up    How often are you  checking your blood sugar? Not at all    What concerns do you have today about your diabetes? None     Do you have any of these symptoms? (Select all that apply)  No numbness or tingling in feet.  No redness, sores or blisters on feet.  No complaints of excessive thirst.  No reports of blurry vision.  No significant changes to weight.    Have you had a diabetic eye exam in the last 12 months? Yes- Date of last eye exam: 3/22,  Location: [place]    BP Readings from Last 2 Encounters:   02/27/23 132/78   02/03/23 131/94     Hemoglobin A1C (%)   Date Value   01/25/2023 8.9 (H)   10/07/2022 7.6 (H)   03/22/2021 6.5 (H)   01/14/2021 8.1 (H)     LDL Cholesterol Calculated (mg/dL)   Date Value   10/07/2022 75   05/06/2022 113 (H)   01/14/2021 91   07/13/2020 89               Gout  Has about 3-4 gout flares per year  All previous episodes been limited to feet/toes  Very responsive to indomethacin as needed  Has never been on preventative regimen    Hyperlipidemia Follow-Up    Are you regularly taking any medication or supplement to lower your cholesterol?   Yes- Zocor    Are you having muscle aches or other side effects that you think could be caused by your cholesterol lowering medication?  No    Hypertension Follow-up    Do you check your blood pressure regularly outside of the clinic? Yes     Are you following a low salt diet? Yes    Are your blood pressures ever more than 140 on the top number (systolic) OR more   than 90 on the bottom number (diastolic), for example 140/90? Yes  -Denies headache, dizziness, lightheadedness, chest pain, weakness, shortness of breath, peripheral edema    Allergic rhinitis/itching  Chronic idiopathic pruritus; no known allergies  Has been fairly controlled with combination of Zyrtec, Claritin, Singulair  No associated rashes, respiratory symptoms  No obvious exposure associations    Arthritis  History of osteoarthritis bilateral knees    Review of Systems   Constitutional, HEENT,  "cardiovascular, pulmonary, gi and gu systems are negative, except as otherwise noted.      Objective    /78 (BP Location: Left arm, Patient Position: Chair, Cuff Size: Adult Large)   Pulse 91   Temp 97.7  F (36.5  C) (Tympanic)   Ht 1.88 m (6' 2\")   Wt (!) 155.6 kg (343 lb)   SpO2 95%   BMI 44.04 kg/m    Body mass index is 44.04 kg/m .  Physical Exam  Vitals reviewed.   Constitutional:       Appearance: Normal appearance.   HENT:      Head: Normocephalic and atraumatic.   Cardiovascular:      Rate and Rhythm: Normal rate and regular rhythm.      Heart sounds: No murmur heard.  Pulmonary:      Effort: Pulmonary effort is normal.      Breath sounds: Normal breath sounds. No stridor. No wheezing, rhonchi or rales.   Musculoskeletal:         General: Normal range of motion.   Skin:     General: Skin is warm and dry.   Neurological:      General: No focal deficit present.      Mental Status: He is alert and oriented to person, place, and time.   Psychiatric:         Mood and Affect: Mood normal.         Behavior: Behavior normal.       "

## 2023-03-01 ENCOUNTER — TELEPHONE (OUTPATIENT)
Dept: FAMILY MEDICINE | Facility: OTHER | Age: 53
End: 2023-03-01

## 2023-03-01 NOTE — TELEPHONE ENCOUNTER
MTM referral from: [facility] visit (referral by provider)    MTM referral outreach attempt #2 on March 1, 2023 at 12:14 PM      Outcome: Patient is not interested at this time because of private pay, will route to MTM Pharmacist/Provider as an FYI. Thank you for the referral.     Jorge Garza, MTM coordinator

## 2023-05-05 DIAGNOSIS — E11.9 TYPE 2 DIABETES MELLITUS WITHOUT COMPLICATION, WITHOUT LONG-TERM CURRENT USE OF INSULIN (H): ICD-10-CM

## 2023-05-05 NOTE — TELEPHONE ENCOUNTER
Ozempic      Last Written Prescription Date:  4.6.23  Last Fill Quantity: #1.5mL,   # refills: 0  Last Office Visit: 2.27.23  Future Office visit:    Next 5 appointments (look out 90 days)    Jun 27, 2023  8:30 AM  (Arrive by 8:15 AM)  SHORT with Ildefonso Lopez MD  [facility] ([facility] [place] ) [address]  [place] [address]  [phone]           Routing refill request to provider for review/approval because:  Drug not on the [facility], [facility] or [facility] refill protocol or controlled substance    
negative...

## 2023-06-26 DIAGNOSIS — E11.9 TYPE 2 DIABETES MELLITUS WITHOUT COMPLICATION, WITHOUT LONG-TERM CURRENT USE OF INSULIN (H): ICD-10-CM

## 2023-06-26 NOTE — TELEPHONE ENCOUNTER
Ozempic      Last Written Prescription Date:  5/5/23  Last Fill Quantity: 3,   # refills: 0  Last Office Visit: 2/27/23  Future Office visit:    Next 5 appointments (look out 90 days)    Jun 27, 2023  8:30 AM  (Arrive by 8:15 AM)  SHORT with Ildefonso Lopez MD  [facility] [place] ([facility] [place] ) [address]  [place] [address]  [phone]

## 2023-06-26 NOTE — PROGRESS NOTES
"  Assessment & Plan     Type 2 diabetes mellitus without complication, without long-term current use of insulin (H)  Last A1c uptrending to 8.9 but has since been started on Ozempic and doing great.  Anticipate improvement of A1c.  Increase Ozempic to 2 mg.  - Comprehensive metabolic panel (BMP + Alb, Alk Phos, ALT, AST, Total. Bili, TP); Future  - Hemoglobin A1c; Future  - Semaglutide, 2 MG/DOSE, (OZEMPIC) 8 MG/3ML pen; Inject 2 mg Subcutaneous every 7 days  - Januvia 100 mg daily  - Statin/lisinopril/ASA  - Reminded him that he is due for diabetic eye exam  - Non-smoker  - Diabetic foot exam 2/27/2023    Hyperlipidemia LDL goal <100  At goal on statin therapy.  Switching simvastatin for pravastatin his transaminitis.  - Lipid Profile (Chol, Trig, HDL, LDL calc); Future  - pravastatin (PRAVACHOL) 40 MG tablet; Take 1 tablet (40 mg) by mouth daily    Benign essential hypertension  At goal.  - Comprehensive metabolic panel (BMP + Alb, Alk Phos, ALT, AST, Total. Bili, TP); Future  - Amlodipine 10 mg daily  - Lisinopril 15 mg daily    Elevated LFTs  Mild chronic transaminitis (AST<ALT) of unclear etiology; ongoing for at least 6 years per chart review.  No acute change but will at least start with RUQ ultrasound and repeat liver enzymes today.  - Comprehensive metabolic panel (BMP + Alb, Alk Phos, ALT, AST, Total. Bili, TP); Future  - US Abdomen Limited; Future  - pravastatin (PRAVACHOL) 40 MG tablet; Take 1 tablet (40 mg) by mouth daily    33 minutes spent by me on the date of the encounter doing chart review, history and exam, documentation and further activities per the note       BMI:   Estimated body mass index is 42.52 kg/m  as calculated from the following:    Height as of 2/27/23: 1.88 m (6' 2\").    Weight as of this encounter: 150.2 kg (331 lb 3.2 oz).   Weight management plan: Discussed healthy diet and exercise guidelines    Follow-up 3 months.    Ildefonso Lopez MD  [facility] - " GLENYS Martinez is a 53 year old, presenting for the following health issues:  Diabetes, Lipids, Hypertension, Recheck Medication, and Chronic Disease Management      HPI     Diabetes Follow-up    How often are you checking your blood sugar? Not at all    What concerns do you have today about your diabetes? None     Do you have any of these symptoms? (Select all that apply)  No numbness or tingling in feet.  No redness, sores or blisters on feet.  No complaints of excessive thirst.  No reports of blurry vision.  No significant changes to weight.    Have you had a diabetic eye exam in the last 12 months? Yes- Date of last eye exam: 6/2022,  Location: [place]     -Plans to set up diabetic eye exam  -Tolerating the Ozempic well, up to 1 mg daily  -No GI side effects or other issues with the Ozempic  -About 15 pound weight loss since starting Ozempic 3 months ago        Hyperlipidemia Follow-Up    Are you regularly taking any medication or supplement to lower your cholesterol?   Yes- simvastatin    Are you having muscle aches or other side effects that you think could be caused by your cholesterol lowering medication?  No    Hypertension Follow-up    Do you check your blood pressure regularly outside of the clinic? No     Are you following a low salt diet? No    Are your blood pressures ever more than 140 on the top number (systolic) OR more   than 90 on the bottom number (diastolic), for example 140/90? N/a  -No headache, dizziness, lightheadedness, chest pain, palpitations, shortness of breath, peripheral edema    BP Readings from Last 2 Encounters:   06/27/23 118/76   02/27/23 132/78     Hemoglobin A1C (%)   Date Value   01/25/2023 8.9 (H)   10/07/2022 7.6 (H)   03/22/2021 6.5 (H)   01/14/2021 8.1 (H)     LDL Cholesterol Calculated (mg/dL)   Date Value   10/07/2022 75   05/06/2022 113 (H)   01/14/2021 91   07/13/2020 89     Review of Systems   Constitutional, HEENT, cardiovascular, pulmonary, gi and gu  systems are negative, except as otherwise noted.      Objective    /76   Pulse 82   Temp 97.2  F (36.2  C)   Resp 18   Wt (!) 150.2 kg (331 lb 3.2 oz)   SpO2 97%   BMI 42.52 kg/m    Body mass index is 42.52 kg/m .  Physical Exam  Vitals reviewed.   Constitutional:       Appearance: Normal appearance.   HENT:      Head: Normocephalic and atraumatic.   Cardiovascular:      Rate and Rhythm: Normal rate and regular rhythm.      Heart sounds: No murmur heard.  Pulmonary:      Effort: Pulmonary effort is normal.      Breath sounds: Normal breath sounds. No stridor. No wheezing, rhonchi or rales.   Musculoskeletal:         General: Normal range of motion.   Skin:     General: Skin is warm and dry.   Neurological:      General: No focal deficit present.      Mental Status: He is alert and oriented to person, place, and time.   Psychiatric:         Mood and Affect: Mood normal.         Behavior: Behavior normal.

## 2023-06-27 ENCOUNTER — OFFICE VISIT (OUTPATIENT)
Dept: FAMILY MEDICINE | Facility: OTHER | Age: 53
End: 2023-06-27
Attending: STUDENT IN AN ORGANIZED HEALTH CARE EDUCATION/TRAINING PROGRAM
Payer: COMMERCIAL

## 2023-06-27 VITALS
SYSTOLIC BLOOD PRESSURE: 118 MMHG | OXYGEN SATURATION: 97 % | DIASTOLIC BLOOD PRESSURE: 76 MMHG | TEMPERATURE: 97.2 F | HEART RATE: 82 BPM | BODY MASS INDEX: 42.52 KG/M2 | WEIGHT: 315 LBS | RESPIRATION RATE: 18 BRPM

## 2023-06-27 DIAGNOSIS — E11.9 TYPE 2 DIABETES MELLITUS WITHOUT COMPLICATION, WITHOUT LONG-TERM CURRENT USE OF INSULIN (H): Primary | ICD-10-CM

## 2023-06-27 DIAGNOSIS — I10 BENIGN ESSENTIAL HYPERTENSION: ICD-10-CM

## 2023-06-27 DIAGNOSIS — E78.5 HYPERLIPIDEMIA LDL GOAL <100: ICD-10-CM

## 2023-06-27 DIAGNOSIS — R79.89 ELEVATED LFTS: ICD-10-CM

## 2023-06-27 LAB
ALBUMIN SERPL BCG-MCNC: 4.3 G/DL (ref 3.5–5.2)
ALP SERPL-CCNC: 65 U/L (ref 40–129)
ALT SERPL W P-5'-P-CCNC: 87 U/L (ref 0–70)
ANION GAP SERPL CALCULATED.3IONS-SCNC: 12 MMOL/L (ref 7–15)
AST SERPL W P-5'-P-CCNC: 61 U/L (ref 0–45)
BILIRUB SERPL-MCNC: 0.4 MG/DL
BUN SERPL-MCNC: 14.4 MG/DL (ref 6–20)
CALCIUM SERPL-MCNC: 9.7 MG/DL (ref 8.6–10)
CHLORIDE SERPL-SCNC: 102 MMOL/L (ref 98–107)
CHOLEST SERPL-MCNC: 157 MG/DL
CREAT SERPL-MCNC: 0.97 MG/DL (ref 0.67–1.17)
DEPRECATED HCO3 PLAS-SCNC: 24 MMOL/L (ref 22–29)
EST. AVERAGE GLUCOSE BLD GHB EST-MCNC: 148 MG/DL
GFR SERPL CREATININE-BSD FRML MDRD: >90 ML/MIN/1.73M2
GLUCOSE SERPL-MCNC: 116 MG/DL (ref 70–99)
HBA1C MFR BLD: 6.8 %
HDLC SERPL-MCNC: 47 MG/DL
LDLC SERPL CALC-MCNC: 83 MG/DL
NONHDLC SERPL-MCNC: 110 MG/DL
POTASSIUM SERPL-SCNC: 4.1 MMOL/L (ref 3.4–5.3)
PROT SERPL-MCNC: 7.1 G/DL (ref 6.4–8.3)
SODIUM SERPL-SCNC: 138 MMOL/L (ref 136–145)
TRIGL SERPL-MCNC: 135 MG/DL

## 2023-06-27 PROCEDURE — 83036 HEMOGLOBIN GLYCOSYLATED A1C: CPT | Performed by: STUDENT IN AN ORGANIZED HEALTH CARE EDUCATION/TRAINING PROGRAM

## 2023-06-27 PROCEDURE — 36415 COLL VENOUS BLD VENIPUNCTURE: CPT | Performed by: STUDENT IN AN ORGANIZED HEALTH CARE EDUCATION/TRAINING PROGRAM

## 2023-06-27 PROCEDURE — 99214 OFFICE O/P EST MOD 30 MIN: CPT | Performed by: STUDENT IN AN ORGANIZED HEALTH CARE EDUCATION/TRAINING PROGRAM

## 2023-06-27 PROCEDURE — 80061 LIPID PANEL: CPT | Performed by: STUDENT IN AN ORGANIZED HEALTH CARE EDUCATION/TRAINING PROGRAM

## 2023-06-27 PROCEDURE — 80053 COMPREHEN METABOLIC PANEL: CPT | Performed by: STUDENT IN AN ORGANIZED HEALTH CARE EDUCATION/TRAINING PROGRAM

## 2023-06-27 RX ORDER — PRAVASTATIN SODIUM 40 MG
40 TABLET ORAL DAILY
Qty: 90 TABLET | Refills: 1 | Status: SHIPPED | OUTPATIENT
Start: 2023-06-27 | End: 2024-01-16

## 2023-06-27 RX ORDER — SEMAGLUTIDE 1.34 MG/ML
INJECTION, SOLUTION SUBCUTANEOUS
Qty: 3 ML | Refills: 0 | OUTPATIENT
Start: 2023-06-27

## 2023-06-27 ASSESSMENT — PAIN SCALES - GENERAL: PAINLEVEL: NO PAIN (0)

## 2023-07-17 ENCOUNTER — HOSPITAL ENCOUNTER (OUTPATIENT)
Dept: ULTRASOUND IMAGING | Facility: HOSPITAL | Age: 53
Discharge: HOME | End: 2023-07-17
Attending: STUDENT IN AN ORGANIZED HEALTH CARE EDUCATION/TRAINING PROGRAM | Admitting: STUDENT IN AN ORGANIZED HEALTH CARE EDUCATION/TRAINING PROGRAM
Payer: COMMERCIAL

## 2023-07-17 DIAGNOSIS — R79.89 ELEVATED LFTS: ICD-10-CM

## 2023-07-17 PROCEDURE — 76705 ECHO EXAM OF ABDOMEN: CPT

## 2023-08-22 ENCOUNTER — TRANSFERRED RECORDS (OUTPATIENT)
Dept: HEALTH INFORMATION MANAGEMENT | Facility: CLINIC | Age: 53
End: 2023-08-22

## 2023-08-22 DIAGNOSIS — M19.90 ARTHRITIS: ICD-10-CM

## 2023-08-22 LAB — RETINOPATHY: NEGATIVE

## 2023-08-23 ENCOUNTER — APPOINTMENT (OUTPATIENT)
Dept: GENERAL RADIOLOGY | Facility: HOSPITAL | Age: 53
End: 2023-08-23
Attending: NURSE PRACTITIONER
Payer: COMMERCIAL

## 2023-08-23 ENCOUNTER — HOSPITAL ENCOUNTER (EMERGENCY)
Facility: HOSPITAL | Age: 53
Discharge: HOME | End: 2023-08-23
Attending: NURSE PRACTITIONER | Admitting: NURSE PRACTITIONER
Payer: COMMERCIAL

## 2023-08-23 VITALS
RESPIRATION RATE: 18 BRPM | DIASTOLIC BLOOD PRESSURE: 79 MMHG | HEART RATE: 75 BPM | SYSTOLIC BLOOD PRESSURE: 140 MMHG | OXYGEN SATURATION: 97 % | TEMPERATURE: 96.6 F

## 2023-08-23 DIAGNOSIS — M54.50 LOWER BACK PAIN: Primary | ICD-10-CM

## 2023-08-23 PROCEDURE — 72100 X-RAY EXAM L-S SPINE 2/3 VWS: CPT

## 2023-08-23 PROCEDURE — 99213 OFFICE O/P EST LOW 20 MIN: CPT | Performed by: NURSE PRACTITIONER

## 2023-08-23 PROCEDURE — 250N000011 HC RX IP 250 OP 636: Mod: JZ | Performed by: NURSE PRACTITIONER

## 2023-08-23 PROCEDURE — G0463 HOSPITAL OUTPT CLINIC VISIT: HCPCS | Mod: 25

## 2023-08-23 PROCEDURE — 96372 THER/PROPH/DIAG INJ SC/IM: CPT | Performed by: NURSE PRACTITIONER

## 2023-08-23 RX ORDER — KETOROLAC TROMETHAMINE 30 MG/ML
30 INJECTION, SOLUTION INTRAMUSCULAR; INTRAVENOUS ONCE
Status: COMPLETED | OUTPATIENT
Start: 2023-08-23 | End: 2023-08-23

## 2023-08-23 RX ORDER — PREDNISONE 20 MG/1
TABLET ORAL
Qty: 10 TABLET | Refills: 0 | Status: SHIPPED | OUTPATIENT
Start: 2023-08-23 | End: 2024-04-02

## 2023-08-23 RX ORDER — CYCLOBENZAPRINE HCL 10 MG
10 TABLET ORAL 2 TIMES DAILY PRN
Qty: 15 TABLET | Refills: 0 | Status: SHIPPED | OUTPATIENT
Start: 2023-08-23 | End: 2023-08-25

## 2023-08-23 RX ADMIN — KETOROLAC TROMETHAMINE 30 MG: 30 INJECTION, SOLUTION INTRAMUSCULAR; INTRAVENOUS at 11:09

## 2023-08-23 ASSESSMENT — ENCOUNTER SYMPTOMS
FREQUENCY: 0
NECK PAIN: 0
BACK PAIN: 1
NECK STIFFNESS: 0
SHORTNESS OF BREATH: 0
DYSURIA: 0
MYALGIAS: 1
VOMITING: 0
FEVER: 0
PSYCHIATRIC NEGATIVE: 1
NAUSEA: 0
HEMATURIA: 0
DIARRHEA: 0
CHILLS: 0

## 2023-08-23 ASSESSMENT — ACTIVITIES OF DAILY LIVING (ADL): ADLS_ACUITY_SCORE: 35

## 2023-08-23 NOTE — ED TRIAGE NOTES
Patient presents to the emergency room with complaints of back pain. Back pain started Sunday night. Denies any injuries or trauma. Back pain is worsening. Pain radiates down his left leg. Took ibuprofen at 0515.

## 2023-08-23 NOTE — DISCHARGE INSTRUCTIONS
Prednisone as ordered (watch blood sugars closely while on prednisone)    Cyclobenzaprine as needed for muscle spasms    Rest    Gentle stretching    Alternate ice and heat as needed    Over-the-counter topical anesthetics such as Bengay, IcyHot, Biofreeze or Lidoderm patches as needed    If continued back pain, consider following up with primary care provider to review possible need for MRI of lower back.    Follow-up with primary care provider or return to urgent care/ED with any worsening in condition or additional concerns.

## 2023-08-23 NOTE — ED TRIAGE NOTES
Pt presents with c/o lower left back/hip pain that has been increasing and radiating down left leg. starting Sunday night, pt denies injury to back. Otc ibuprofen taken at 0515 denies hx of previous back injuries/surgeries.

## 2023-08-23 NOTE — ED PROVIDER NOTES
History     Chief Complaint   Patient presents with    Back Pain     HPI  Jason Duncan is a 53 year old male who presents to urgent care today ambulatory with complaints of left lower back pain which radiates down left lower leg.  Onset was Sunday night.  Denies any fall, injury or trauma.  Denies any significant history of back pain.  Denies any bowel or bladder dysfunction.  Denies any saddle anesthesia.  Denies any numbness or weakness of bilateral lower extremities and attributed to pain.  Denies any fever, chills, nausea, vomiting, diarrhea, shortness of breath or chest pain.  Denies any dysuria, frequency or hematuria.  Denies any neck pain or stiffness.  Took ibuprofen at 0515 this morning, no other OTC meds.  No other concerns.    Allergies:  No Known Allergies    Problem List:    Patient Active Problem List    Diagnosis Date Noted    Itching 02/27/2023     Priority: Medium    Elevated LFTs 02/27/2023     Priority: Medium    Chronic seasonal allergic rhinitis 02/27/2023     Priority: Medium    Idiopathic gout, unspecified chronicity, unspecified site 02/27/2023     Priority: Medium    Arthritis 02/27/2023     Priority: Medium    Primary osteoarthritis of left knee 02/21/2020     Priority: Medium    Hyperlipidemia LDL goal <100 09/16/2019     Priority: Medium    Primary insomnia 09/16/2019     Priority: Medium    Chronic gout of multiple sites; feet 09/16/2019     Priority: Medium    Type 2 diabetes mellitus without complication, without long-term current use of insulin (H) 07/10/2018     Priority: Medium    Vitamin D deficiency 04/17/2018     Priority: Medium    RACHELE (obstructive sleep apnea); on cpap 05/30/2017     Priority: Medium    Morbid obesity due to excess calories (H) 09/07/2016     Priority: Medium    Gastric band slippage 09/01/2015     Priority: Medium    Mixed hearing loss, unilateral 11/20/2013     Priority: Medium    Status post bariatric surgery; Lap band now removed 09/25/2008      Priority: Medium    Benign essential hypertension 06/09/2008     Priority: Medium        Past Medical History:    Past Medical History:   Diagnosis Date    Allergic rhinitis     Benign essential hypertension 6/9/2008    Chronic gout of multiple sites 9/16/2019    Cramp of limb 4/17/2018    Encounter for monitoring statin therapy 6/28/2016    Hyperlipidemia LDL goal <100 9/16/2019    RACHELE (obstructive sleep apnea)     Primary insomnia 9/16/2019    Primary osteoarthritis of left knee 2/21/2020    Type 2 diabetes mellitus without complication, without long-term current use of insulin (H) 7/10/2018    Vitamin D deficiency 4/17/2018       Past Surgical History:    Past Surgical History:   Procedure Laterality Date    ARTHROSCOPY KNEE Left 3/30/2021    Procedure: LEFT KNEE ARTHROSCOPY:PARTIAL MEDIAL MENISECTOMY, PARTIAL LATERAL MENISECTOMY;  Surgeon: Donny Au MD;  Location: [facility]    ARTHROSCOPY KNEE Right 2/3/2023    Procedure: Right Knee Arthroscopy, Partial Medial Menisectomy, Extensive Debridement, Loose Body Removal, Chondroplasty;  Surgeon: Avinash Hampton MD;  Location: [facility]    GASTRIC RESTRICTIVE PROCEDURE, OPEN, REMOVE/REPLACE SUBCUTANEOUS PORT  2008    GI SURGERY  2015    removal of lap band     lap band  2008    nasal septoplasty      SEPTOPLASTY      Nasal       Family History:    Family History   Problem Relation Age of Onset    Dementia Mother 72        Cause of death    Diabetes Father     Respiratory Father         COPD    Cardiovascular Father         CHF       Social History:  Marital Status:   [2]  Social History     Tobacco Use    Smoking status: Never    Smokeless tobacco: Never   Substance Use Topics    Alcohol use: Yes     Comment: rarely    Drug use: No        Medications:    cyclobenzaprine (FLEXERIL) 10 MG tablet  predniSONE (DELTASONE) 20 MG tablet  amLODIPine (NORVASC) 10 MG tablet  aspirin 81 MG tablet  cetirizine (ZYRTEC) 10 MG tablet  indomethacin (INDOCIN) 25 MG  capsule  lisinopril (ZESTRIL) 10 MG tablet  loratadine (CLARITIN) 10 MG tablet  metFORMIN (GLUCOPHAGE XR) 500 MG 24 hr tablet  montelukast (SINGULAIR) 10 MG tablet  multivitamin w/minerals (THERA-VIT-M) tablet  order for DME  pravastatin (PRAVACHOL) 40 MG tablet  Semaglutide, 1 MG/DOSE, (OZEMPIC) 4 MG/3ML pen  Semaglutide, 2 MG/DOSE, (OZEMPIC) 8 MG/3ML pen  sitagliptin (JANUVIA) 100 MG tablet  VITAMIN D, CHOLECALCIFEROL, PO      Review of Systems   Constitutional:  Negative for chills and fever.   Respiratory:  Negative for shortness of breath.    Cardiovascular:  Negative for chest pain.   Gastrointestinal:  Negative for diarrhea, nausea and vomiting.   Genitourinary:  Negative for dysuria, frequency and hematuria.   Musculoskeletal:  Positive for back pain and myalgias. Negative for gait problem, neck pain and neck stiffness.   Skin: Negative.    Psychiatric/Behavioral: Negative.       Physical Exam   BP: 140/79  Pulse: 75  Temp: (!) 96.6  F (35.9  C)  Resp: 18  SpO2: 97 %    Physical Exam  Vitals and nursing note reviewed.   Constitutional:       General: He is not in acute distress.     Appearance: Normal appearance. He is not ill-appearing or toxic-appearing.   Cardiovascular:      Rate and Rhythm: Normal rate and regular rhythm.      Pulses: Normal pulses.      Heart sounds: Normal heart sounds.   Pulmonary:      Effort: Pulmonary effort is normal.      Breath sounds: Normal breath sounds.   Abdominal:      Tenderness: There is no right CVA tenderness or left CVA tenderness.   Neurological:      Mental Status: He is alert.   Psychiatric:         Mood and Affect: Mood normal.       ED Course     Results for orders placed or performed during the hospital encounter of 08/23/23 (from the past 24 hour(s))   Lumbar spine XR, 2-3 views    Narrative    XR LUMBAR SPINE 2/3 VIEWS    HISTORY: lower back pain .    COMPARISON: 6/30/2020.    TECHNIQUE: 3 views of the lumbosacral spine.    FINDINGS:    Lumbar vertebral body  heights are preserved. Mild transitional anatomy  of S1 is redemonstrated. The sagittal lordosis is preserved. Focal  facet hypertrophy and disc height loss is seen at L5-S1, similar to  prior.        Impression    IMPRESSION:     Chronic focal degenerative changes at L5-S1. Consider  follow-up MR  lumbar spine.    REGINA BUSTOS MD         SYSTEM ID:  [id number]       Medications   ketorolac (TORADOL) injection 30 mg (30 mg Intramuscular $Given 8/23/23 1104)     Assessments & Plan (with Medical Decision Making)     I have reviewed the nursing notes.    I have reviewed the findings, diagnosis, plan and need for follow up with the patient.  (M54.50) Lower back pain  (primary encounter diagnosis)  Plan:  Patient ambulatory with a nontoxic appearance.  Patient able to stand up from a seated position in order to ambulate without difficulty.  Patient has left lower back pain with sciatica.  Denies any significant history of back pain.  Denies any fall, injury or trauma.  Denies any bowel or bladder dysfunction.  Denies any saddle anesthesia.  Denies any numbness or weakness bilateral lower extremities not attributed to pain.  Denies any dysuria, frequency, hematuria and no CVA tenderness.  X-ray indicates chronic focal degenerative changes at L5-S1.  Consider follow-up MRI of lumbar spine, reviewed results with patient, no significant history of back issues, patient to follow-up with PCP as needed for potential MRI in the future as needed.  Prescribe short course of prednisone for pain, patient to monitor blood sugars closely while on medication.  Cyclobenzaprine as needed for muscle spasms, patient has been on medication in the past and tolerated it well.  Patient to rest, alternate ice and heat, over-the-counter topical anesthetics such as Bengay, IcyHot, Biofreeze or Lidoderm patches as needed.  Follow-up with primary care provider or return to urgent care/ED with any worsening in condition or additional concerns.   Patient in agreement with treatment plan.    Discharge Medication List as of 8/23/2023 11:26 AM        START taking these medications    Details   predniSONE (DELTASONE) 20 MG tablet Take two tablets (= 40mg) each day for 5 (five) days, Disp-10 tablet, R-0, E-Prescribe           Final diagnoses:   Lower back pain     8/23/2023   [facility]       Alcira Rico NP  08/23/23 [id number]

## 2023-08-24 RX ORDER — CYCLOBENZAPRINE HCL 10 MG
TABLET ORAL
Qty: 90 TABLET | Refills: 0 | OUTPATIENT
Start: 2023-08-24

## 2023-08-25 ENCOUNTER — OFFICE VISIT (OUTPATIENT)
Dept: FAMILY MEDICINE | Facility: OTHER | Age: 53
End: 2023-08-25
Attending: STUDENT IN AN ORGANIZED HEALTH CARE EDUCATION/TRAINING PROGRAM
Payer: COMMERCIAL

## 2023-08-25 VITALS
HEART RATE: 88 BPM | DIASTOLIC BLOOD PRESSURE: 80 MMHG | OXYGEN SATURATION: 96 % | WEIGHT: 315 LBS | BODY MASS INDEX: 43.01 KG/M2 | TEMPERATURE: 97 F | SYSTOLIC BLOOD PRESSURE: 152 MMHG

## 2023-08-25 DIAGNOSIS — M54.42 ACUTE BILATERAL LOW BACK PAIN WITH LEFT-SIDED SCIATICA: Primary | ICD-10-CM

## 2023-08-25 PROCEDURE — 99214 OFFICE O/P EST MOD 30 MIN: CPT | Performed by: STUDENT IN AN ORGANIZED HEALTH CARE EDUCATION/TRAINING PROGRAM

## 2023-08-25 RX ORDER — CYCLOBENZAPRINE HCL 10 MG
10 TABLET ORAL 2 TIMES DAILY PRN
Qty: 30 TABLET | Refills: 0 | Status: SHIPPED | OUTPATIENT
Start: 2023-08-25 | End: 2023-09-27

## 2023-08-25 ASSESSMENT — PAIN SCALES - GENERAL: PAINLEVEL: WORST PAIN (10)

## 2023-08-25 NOTE — PROGRESS NOTES
Assessment & Plan     Acute bilateral low back pain with left-sided sciatica  Urgent care visit 2 days ago for acute back pain.  Lumbar pain and back tightness slightly improved with prednisone, but now developing left paresthesias in the S1 dermatome.  This fits with lumbar x-ray with chronic focal degenerative changes at L5-S1.  Still no red flag symptoms prompting urgent MRI but likely needs it if symptoms recur or persist.  Recommend continue monitoring and supportive cares.  - cyclobenzaprine (FLEXERIL) 10 MG tablet; Take 1 tablet (10 mg) by mouth 2 times daily as needed for muscle spasms  - Complete course of prednisone from ED  - Consider PT  - Consider chiropractor, massage, heating pad, lidocaine patch  - Anticipate needing MRI at some point  - Reviewed S/S that warrant emergent evaluation    I spent a total of 32 minutes on the day of the visit.   Time spent by me doing chart review, history and exam, documentation and further activities per the note         Ildefonso Loepz MD  [facility] [place]    Kyleigh Martinez is a 53 year old, presenting for the following health issues:  Ankle Pain        8/25/2023    12:49 PM   Additional Questions   Roomed by Denilson Parrish CMA   Accompanied by Self       HPI     ED/UC Followup:    Facility:  [facility]  Date of visit: 8/23/23  Reason for visit: low back pain  Current Status: back is better but now ;left ankle hurts.  Is taking Prednisone for back    -Seen in urgent care 8/23/2023 for low back pain  -Lumbar x-ray with chronic focal degenerative changes at L5/S1  -Given Toradol and then discharged with short prednisone burst  -Actual back symptoms doing quite a bit better but now developed some left lower leg lateral pain  -No visual changes of leg in the region of pain such as edema or erythema  -Very sharp pain sensation  -Worse when first trying to stand up, also exacerbated with walking  -No lower extremity weakness  -No bowel/bladder issues,  saddle paresthesias    Review of Systems   Constitutional, HEENT, cardiovascular, pulmonary, gi and gu systems are negative, except as otherwise noted.      Objective    BP (!) 152/80   Pulse 88   Temp 97  F (36.1  C) (Tympanic)   Wt (!) 152 kg (335 lb)   SpO2 96%   BMI 43.01 kg/m    Body mass index is 43.01 kg/m .  Physical Exam  Vitals reviewed.   Constitutional:       General: He is not in acute distress.     Appearance: Normal appearance. He is not ill-appearing or toxic-appearing.   Musculoskeletal:      Right lower leg: No edema.      Left lower leg: No edema.        Legs:       Comments: Vague diffuse back tenderness.  ROM/strength lower extremities intact.  Region of sharp pains as outlined above.  SLR negative bilaterally.   Skin:     General: Skin is warm and dry.   Neurological:      General: No focal deficit present.      Mental Status: He is alert and oriented to person, place, and time.      Comments: Guarded gait   Psychiatric:         Mood and Affect: Mood normal.         Behavior: Behavior normal.

## 2023-08-25 NOTE — PROGRESS NOTES
{PROVIDER CHARTING PREFERENCE:936519}    Subjective   Juan is a 53 year old, presenting for the following health issues:  No chief complaint on file.  {(!) Visit Details have not yet been documented.  Please enter Visit Details and then use this list to pull in documentation. (Optional):898455}    HPI     Pain History:  When did you first notice your pain? {Duration of pain :940367}  {additonal problems for provider to add (Optional):458948}      Review of Systems   {ROS COMP (Optional):709275}      Objective    There were no vitals taken for this visit.  There is no height or weight on file to calculate BMI.  Physical Exam   {Exam List (Optional):526602}    {Diagnostic Test Results (Optional):952011}    {AMBULATORY ATTESTATION (Optional):997799}

## 2023-08-28 ENCOUNTER — TELEPHONE (OUTPATIENT)
Dept: FAMILY MEDICINE | Facility: OTHER | Age: 53
End: 2023-08-28

## 2023-08-28 DIAGNOSIS — M54.42 ACUTE BILATERAL LOW BACK PAIN WITH LEFT-SIDED SCIATICA: Primary | ICD-10-CM

## 2023-08-28 NOTE — TELEPHONE ENCOUNTER
"Patient calling to state feeling \"much better\".  Still has a lot of pain in L hip & ankle  Wife (nurse) suggested to ask for neurontin to assist with pain management  States currently using flexeril & ibuprofen for muscle pain    Pended to PCP to review med request      [facility]:  [facility]  "

## 2023-08-29 RX ORDER — GABAPENTIN 100 MG/1
100 CAPSULE ORAL 3 TIMES DAILY
Qty: 90 CAPSULE | Refills: 0 | Status: SHIPPED | OUTPATIENT
Start: 2023-08-29 | End: 2023-09-27

## 2023-08-30 NOTE — TELEPHONE ENCOUNTER
Ildefonso Lopez MD  You18 hours ago (2:45 PM)     LIZ  Okay to try gabapentin, will start with very low-dose.  Would recommend follow-up for further discussion before any other adjustments.  May continue with Flexeril and limited ibuprofen use.  Thank you.

## 2023-08-30 NOTE — TELEPHONE ENCOUNTER
LVM that med is ready for p/up  Recommended to call back for a f/up appt if any further med adjustments are needed

## 2023-09-01 ENCOUNTER — TELEPHONE (OUTPATIENT)
Dept: FAMILY MEDICINE | Facility: OTHER | Age: 53
End: 2023-09-01

## 2023-09-01 ENCOUNTER — TRANSFERRED RECORDS (OUTPATIENT)
Dept: MULTI SPECIALTY CLINIC | Facility: CLINIC | Age: 53
End: 2023-09-01

## 2023-09-01 DIAGNOSIS — G47.33 OSA (OBSTRUCTIVE SLEEP APNEA): Primary | ICD-10-CM

## 2023-09-01 LAB — RETINOPATHY: NORMAL

## 2023-09-01 NOTE — TELEPHONE ENCOUNTER
Patient calling in regards to CPAP device settings.  Patient asking for an increase in pressure to be set at auto.   [facility] requesting an order from PCP to make device adjustments.    Order attached  Pended to PCP to reviiew    Please fax to [facility], [place]

## 2023-09-27 ENCOUNTER — OFFICE VISIT (OUTPATIENT)
Dept: FAMILY MEDICINE | Facility: OTHER | Age: 53
End: 2023-09-27
Attending: STUDENT IN AN ORGANIZED HEALTH CARE EDUCATION/TRAINING PROGRAM
Payer: COMMERCIAL

## 2023-09-27 VITALS
RESPIRATION RATE: 18 BRPM | SYSTOLIC BLOOD PRESSURE: 162 MMHG | HEART RATE: 77 BPM | OXYGEN SATURATION: 95 % | WEIGHT: 315 LBS | BODY MASS INDEX: 40.43 KG/M2 | DIASTOLIC BLOOD PRESSURE: 94 MMHG | TEMPERATURE: 96.6 F | HEIGHT: 74 IN

## 2023-09-27 DIAGNOSIS — K76.0 NAFL (NONALCOHOLIC FATTY LIVER): ICD-10-CM

## 2023-09-27 DIAGNOSIS — E11.9 TYPE 2 DIABETES MELLITUS WITHOUT COMPLICATION, WITHOUT LONG-TERM CURRENT USE OF INSULIN (H): Primary | ICD-10-CM

## 2023-09-27 DIAGNOSIS — E78.5 HYPERLIPIDEMIA LDL GOAL <100: ICD-10-CM

## 2023-09-27 DIAGNOSIS — G89.29 CHRONIC BILATERAL LOW BACK PAIN WITH LEFT-SIDED SCIATICA: ICD-10-CM

## 2023-09-27 DIAGNOSIS — M54.42 CHRONIC BILATERAL LOW BACK PAIN WITH LEFT-SIDED SCIATICA: ICD-10-CM

## 2023-09-27 DIAGNOSIS — I10 BENIGN ESSENTIAL HYPERTENSION: ICD-10-CM

## 2023-09-27 LAB
ANION GAP SERPL CALCULATED.3IONS-SCNC: 11 MMOL/L (ref 7–15)
BUN SERPL-MCNC: 11.5 MG/DL (ref 6–20)
CALCIUM SERPL-MCNC: 9.8 MG/DL (ref 8.6–10)
CHLORIDE SERPL-SCNC: 103 MMOL/L (ref 98–107)
CREAT SERPL-MCNC: 1.01 MG/DL (ref 0.67–1.17)
DEPRECATED HCO3 PLAS-SCNC: 26 MMOL/L (ref 22–29)
EGFRCR SERPLBLD CKD-EPI 2021: 90 ML/MIN/1.73M2
EST. AVERAGE GLUCOSE BLD GHB EST-MCNC: 140 MG/DL
GLUCOSE SERPL-MCNC: 110 MG/DL (ref 70–99)
HBA1C MFR BLD: 6.5 %
POTASSIUM SERPL-SCNC: 3.8 MMOL/L (ref 3.4–5.3)
SODIUM SERPL-SCNC: 140 MMOL/L (ref 135–145)

## 2023-09-27 PROCEDURE — 99214 OFFICE O/P EST MOD 30 MIN: CPT | Performed by: STUDENT IN AN ORGANIZED HEALTH CARE EDUCATION/TRAINING PROGRAM

## 2023-09-27 PROCEDURE — 36415 COLL VENOUS BLD VENIPUNCTURE: CPT | Performed by: STUDENT IN AN ORGANIZED HEALTH CARE EDUCATION/TRAINING PROGRAM

## 2023-09-27 PROCEDURE — 80048 BASIC METABOLIC PNL TOTAL CA: CPT | Performed by: STUDENT IN AN ORGANIZED HEALTH CARE EDUCATION/TRAINING PROGRAM

## 2023-09-27 PROCEDURE — 83036 HEMOGLOBIN GLYCOSYLATED A1C: CPT | Performed by: STUDENT IN AN ORGANIZED HEALTH CARE EDUCATION/TRAINING PROGRAM

## 2023-09-27 RX ORDER — CYCLOBENZAPRINE HCL 10 MG
10 TABLET ORAL 3 TIMES DAILY PRN
Qty: 90 TABLET | Refills: 1 | Status: SHIPPED | OUTPATIENT
Start: 2023-09-27 | End: 2024-02-07

## 2023-09-27 RX ORDER — GABAPENTIN 300 MG/1
300 CAPSULE ORAL 2 TIMES DAILY
Qty: 180 CAPSULE | Refills: 0 | Status: SHIPPED | OUTPATIENT
Start: 2023-09-27 | End: 2024-04-02

## 2023-09-27 RX ORDER — LISINOPRIL 30 MG/1
30 TABLET ORAL DAILY
Qty: 90 TABLET | Refills: 1 | Status: SHIPPED | OUTPATIENT
Start: 2023-09-27 | End: 2023-12-28

## 2023-09-27 RX ORDER — IBUPROFEN 800 MG/1
800 TABLET, FILM COATED ORAL 2 TIMES DAILY PRN
Qty: 90 TABLET | Refills: 1 | Status: SHIPPED | OUTPATIENT
Start: 2023-09-27

## 2023-09-27 ASSESSMENT — PAIN SCALES - GENERAL: PAINLEVEL: EXTREME PAIN (8)

## 2023-09-27 NOTE — PROGRESS NOTES
Assessment & Plan     Type 2 diabetes mellitus without complication, without long-term current use of insulin (H)  Last A1c improved nicely to 6.8 with addition of Ozempic.  However returns today and weight is actually slightly up.  No GI side effects.  Continue current regimen, preliminary discussion about escalating to trial of Mounjaro.  - Hemoglobin A1c; Future  - Basic metabolic panel; Future  - Semaglutide, 2 MG/DOSE, (OZEMPIC) 8 MG/3ML pen; Inject 2 mg Subcutaneous every 7 days  - Januvia 100 mg daily  - Statin/lisinopril/ASA  - Diabetic eye exam upcoming  - Diabetic foot exam 2/27/2023  - Non-smoker    Hyperlipidemia LDL goal <100  At goal 6/27/2023.  - Pravastatin 40 mg    NAFL (nonalcoholic fatty liver)  LFTs checked in June, improved with addition of Ozempic.  RUQ ultrasound notable for fatty infiltration without other abnormality.  Continue to work on diabetes/weight loss.  - At least annual LFT monitoring    Benign essential hypertension  Elevated the last couple of encounters.  Asymptomatic.  Increase lisinopril and continue monitoring.  - Basic metabolic panel; Future  - lisinopril (ZESTRIL) 30 MG tablet; Take 1 tablet (30 mg) by mouth daily  -Amlodipine 10 mg daily    Chronic bilateral low back pain with left-sided sciatica  Chronic with gradual progression, now almost constant left sciatica.  No red flag symptoms, no prior surgery/specific injury.  Barely functioning with supportive meds.  We will try increasing gabapentin and proceed with MRI.  Discussed IR referral for possible injections versus spine Ortho pending results.  - MR Lumbar Spine w/o Contrast; Future  - cyclobenzaprine (FLEXERIL) 10 MG tablet; Take 1 tablet (10 mg) by mouth 3 times daily as needed for muscle spasms  - gabapentin (NEURONTIN) 300 MG capsule; Take 1 capsule (300 mg) by mouth 2 times daily for 90 days  - ibuprofen (ADVIL/MOTRIN) 800 MG tablet; Take 1 tablet (800 mg) by mouth 2 times daily as needed for moderate pain  -  Reviewed S/S that warrant emergent evaluation    I spent a total of 35 minutes on the day of the visit.   Time spent by me doing chart review, history and exam, documentation and further activities per the note       Follow-up 3 months or sooner as needed.    Ildefonso Lopez MD  [facility] [place] Martinez is a 53 year old, presenting for the following health issues:  Diabetes    HPI     Diabetes Follow-up  How often are you checking your blood sugar? Not at all  What concerns do you have today about your diabetes? None   Do you have any of these symptoms? (Select all that apply)  No numbness or tingling in feet.  No redness, sores or blisters on feet.  No complaints of excessive thirst.  No reports of blurry vision.  No significant changes to weight.  Have you had a diabetic eye exam in the last 12 months? Yes- Date of last eye exam: 09/01/2023,  Location: [place]        Hyperlipidemia Follow-Up  Are you regularly taking any medication or supplement to lower your cholesterol?   Yes- Pravastatin  Are you having muscle aches or other side effects that you think could be caused by your cholesterol lowering medication?  No    Hypertension Follow-up  Do you check your blood pressure regularly outside of the clinic? No   Are you following a low salt diet? Yes [place]  Are your blood pressures ever more than 140 on the top number (systolic) OR more   than 90 on the bottom number (diastolic), for example 140/90? No    BP Readings from Last 2 Encounters:   09/27/23 (!) 162/94   08/25/23 (!) 152/80     Hemoglobin A1C (%)   Date Value   06/27/2023 6.8 (H)   01/25/2023 8.9 (H)   03/22/2021 6.5 (H)   01/14/2021 8.1 (H)     LDL Cholesterol Calculated (mg/dL)   Date Value   06/27/2023 83   10/07/2022 75   01/14/2021 91   07/13/2020 89     Low back pain  -Have seen a couple times in recent months  -Symptoms ongoing, gradually worsening  -Now left sciatic symptoms almost constant  -Sharp pains down left leg with  "almost all movements  -Surprising actually seems to be better when he is walking on uneven terrain  -Stairs are bad  -No bowel/bladder issues  -No saddle paresthesias  -Has tried prednisone, did not help  -Having to use max dose ibuprofen daily to get by  -Flexeril does help a bit although ran out recently  -Gabapentin also seem to help a bit but is on low-dose  -Symptoms are at the point where he is ready to do something about it  -Would be open to injections or spine Ortho referral if indicated  -No prior injury or accident, no recent falls    Review of Systems   Constitutional, HEENT, cardiovascular, pulmonary, gi and gu systems are negative, except as otherwise noted.      Objective    BP (!) 162/94   Pulse 77   Temp (!) 96.6  F (35.9  C) (Tympanic)   Resp 18   Ht 1.88 m (6' 2\")   Wt (!) 154.5 kg (340 lb 9.6 oz)   SpO2 95%   BMI 43.73 kg/m    Body mass index is 43.73 kg/m .  Physical Exam  Vitals reviewed.   Constitutional:       Appearance: Normal appearance.   HENT:      Head: Normocephalic and atraumatic.   Cardiovascular:      Rate and Rhythm: Normal rate and regular rhythm.      Heart sounds: No murmur heard.  Pulmonary:      Effort: Pulmonary effort is normal.      Breath sounds: Normal breath sounds. No stridor. No wheezing, rhonchi or rales.   Musculoskeletal:         General: Normal range of motion.      Cervical back: Normal.      Thoracic back: Normal.      Lumbar back: Normal. Negative right straight leg raise test and negative left straight leg raise test.   Skin:     General: Skin is warm and dry.   Neurological:      General: No focal deficit present.      Mental Status: He is alert and oriented to person, place, and time.   Psychiatric:         Mood and Affect: Mood normal.         Behavior: Behavior normal.                "

## 2023-09-29 ENCOUNTER — TELEPHONE (OUTPATIENT)
Dept: FAMILY MEDICINE | Facility: OTHER | Age: 53
End: 2023-09-29

## 2023-09-29 NOTE — TELEPHONE ENCOUNTER
MTM referral from: [facility] visit (referral by provider)    MTM referral outreach attempt #2 on September 29, 2023 at 9:22 AM      Outcome: Patient not reachable after several attempts, will route to [facility] Pharmacist/Provider as an FYI.  [facility] scheduling number is [phone].  Thank you for the referral.    Use private pay for the carrier/Plan on the flowsheet      Healthcare IT Message Sent    ROSELYN Sharma     Reason for Referral:   Diabetes/wt loss optimization

## 2023-10-09 ENCOUNTER — HOSPITAL ENCOUNTER (OUTPATIENT)
Dept: MRI IMAGING | Facility: HOSPITAL | Age: 53
Discharge: HOME | End: 2023-10-09
Attending: STUDENT IN AN ORGANIZED HEALTH CARE EDUCATION/TRAINING PROGRAM | Admitting: STUDENT IN AN ORGANIZED HEALTH CARE EDUCATION/TRAINING PROGRAM
Payer: COMMERCIAL

## 2023-10-09 DIAGNOSIS — G89.29 CHRONIC BILATERAL LOW BACK PAIN WITH LEFT-SIDED SCIATICA: ICD-10-CM

## 2023-10-09 DIAGNOSIS — M54.42 CHRONIC BILATERAL LOW BACK PAIN WITH LEFT-SIDED SCIATICA: ICD-10-CM

## 2023-10-09 PROCEDURE — 72148 MRI LUMBAR SPINE W/O DYE: CPT

## 2023-10-17 DIAGNOSIS — J30.2 CHRONIC SEASONAL ALLERGIC RHINITIS: ICD-10-CM

## 2023-10-17 RX ORDER — CETIRIZINE HYDROCHLORIDE 10 MG/1
10 TABLET ORAL EVERY EVENING
Qty: 90 TABLET | Refills: 0 | Status: SHIPPED | OUTPATIENT
Start: 2023-10-17 | End: 2024-01-16

## 2023-10-17 NOTE — TELEPHONE ENCOUNTER
Cetirizine      Last Written Prescription Date:  2/27/23  Last Fill Quantity: 90,   # refills: 1  Last Office Visit: 9/27/23  Future Office visit:    Next 5 appointments (look out 90 days)      Dec 28, 2023  8:30 AM  (Arrive by 8:15 AM)  SHORT with Ildefonso Lopez MD  [facility] [place] ([facility] [place] ) [address]  [place] [address]  [phone]

## 2023-11-06 ENCOUNTER — DOCUMENTATION ONLY (OUTPATIENT)
Dept: FAMILY MEDICINE | Facility: OTHER | Age: 53
End: 2023-11-06

## 2023-11-06 DIAGNOSIS — G47.33 OBSTRUCTIVE SLEEP APNEA (ADULT) (PEDIATRIC): Primary | ICD-10-CM

## 2023-11-20 DIAGNOSIS — E11.9 TYPE 2 DIABETES MELLITUS WITHOUT COMPLICATION, WITHOUT LONG-TERM CURRENT USE OF INSULIN (H): ICD-10-CM

## 2023-11-20 NOTE — TELEPHONE ENCOUNTER
Metformin (Glucophage XR) 500 MG 24 hr tablet    Last Written Prescription Date:  02/27/2023  Last Fill Quantity: 360,   # refills: 0  Last Office Visit: 09/27/2023

## 2023-11-21 RX ORDER — METFORMIN HCL 500 MG
TABLET, EXTENDED RELEASE 24 HR ORAL
Qty: 360 TABLET | Refills: 1 | Status: SHIPPED | OUTPATIENT
Start: 2023-11-21 | End: 2024-06-11

## 2023-12-26 NOTE — PROGRESS NOTES
Assessment & Plan     Type 2 diabetes mellitus without complication, without long-term current use of insulin (H)  Morbid obesity due to excess calories (H)  Last A1c continued to improve down to 6.5% on max dose Ozempic.  Still struggling with minimal weight loss but tolerating without GI effects.  Consider trial of escalating to Mounjaro.  Continued emphasis on healthy diet and exercise habits.  Labs pending.  - Hemoglobin A1c; Future  - Basic metabolic panel; Future  - Albumin Random Urine Quantitative with Creat Ratio; Future  - Hemoglobin A1c  - Basic metabolic panel  - Ozempic 2 mg weekly  - Januvia 100 mg daily  - Statin/lisinopril/ASA  - Diabetic eye exam 9/2023  - Diabetic foot exam 2/27/2023  - Non-smoker    Hyperlipidemia LDL goal <100  At goal/27/23.  - Pravastatin 40 mg    Benign essential hypertension  Improved after increased lisinopril dose but still elevated.  Asymptomatic and no side effects.  Will increase lisinopril to 40 mg today.  - Basic metabolic panel; Future  - Albumin Random Urine Quantitative with Creat Ratio; Future  - Basic metabolic panel  - amLODIPine (NORVASC) 10 MG tablet; Take 1 tablet (10 mg) by mouth daily  - lisinopril (ZESTRIL) 40 MG tablet; Take 1 tablet (40 mg) by mouth daily    Screening for malignant neoplasm of prostate  - PSA, screen    Combined arterial insufficiency and corporo-venous occlusive erectile dysfunction  Diagnosis without red flag symptoms.  Has been gradually progressive over time but never addressed.  Update PSA today, unable to give urine but will at follow-up.  Discussed with/benefits of PDE-5i and possible side effects.  - sildenafil (VIAGRA) 25 MG tablet; Take 1-4 tablets ( mg) by mouth daily as needed (Take 30 to 60 minutes before intercourse)    I spent a total of 31 minutes on the day of the visit.   Time spent by me doing chart review, history and exam, documentation and further activities per the note       BMI:   Estimated body mass index  "is 43.15 kg/m  as calculated from the following:    Height as of 9/27/23: 1.88 m (6' 2\").    Weight as of this encounter: 152.5 kg (336 lb 1.6 oz).   Weight management plan: Discussed healthy diet and exercise guidelines    Follow-up about 3 months.    Ildefonso Lopez MD  [facility] [place] Martinez is a 53 year old, presenting for the following health issues:  Diabetes and Hypertension        12/28/2023     8:12 AM   Additional Questions   Roomed by Karin COOPER   Accompanied by none       HPI     Diabetes Follow-up  How often are you checking your blood sugar? Not at all  What concerns do you have today about your diabetes? Other: Rt middle toe( third)   Do you have any of these symptoms? (Select all that apply)  No numbness or tingling in feet.  No redness, sores or blisters on feet.  No complaints of excessive thirst.  No reports of blurry vision.  No significant changes to weight.  -Doing well  -No GI side effects  -Still struggling with any significant weight loss    BP Readings from Last 2 Encounters:   12/28/23 (!) 149/89   09/27/23 (!) 162/94     Hemoglobin A1C (%)   Date Value   09/27/2023 6.5 (H)   06/27/2023 6.8 (H)   03/22/2021 6.5 (H)   01/14/2021 8.1 (H)     LDL Cholesterol Calculated (mg/dL)   Date Value   06/27/2023 83   10/07/2022 75   01/14/2021 91   07/13/2020 89             Hypertension Follow-up  Do you check your blood pressure regularly outside of the clinic? No   Are you following a low salt diet? Yes  Are your blood pressures ever more than 140 on the top number (systolic) OR more   than 90 on the bottom number (diastolic), for example 140/90? Pt does not check BP outside of clinic  How many servings of fruits and vegetables do you eat daily?  2-3  On average, how many sweetened beverages do you drink each day (Examples: soda, juice, sweet tea, etc.  Do NOT count diet or artificially sweetened beverages)?   2  How many days per week do you exercise enough to make your " heart beat faster? 5  How many minutes a day do you exercise enough to make your heart beat faster? 60 or more  How many days per week do you miss taking your medication? 0  No headache, dizziness, lightheadedness, chest pain, wheezing, shortness of breath, peripheral edema    Erectile dysfunction  -Gradually progressive difficulties with getting and maintaining erection  -No dysuria  -No change in bowel habits  -No recent social/relationship changes  -Interested in trial of ED med    Review of Systems   Constitutional, HEENT, cardiovascular, pulmonary, gi and gu systems are negative, except as otherwise noted.      Objective    BP (!) 149/89 (BP Location: Right arm, Patient Position: Sitting, Cuff Size: Adult Large)   Pulse 80   Temp 97.2  F (36.2  C) (Tympanic)   Wt (!) 152.5 kg (336 lb 1.6 oz)   SpO2 96%   BMI 43.15 kg/m    Body mass index is 43.15 kg/m .  Physical Exam  Vitals reviewed.   Constitutional:       Appearance: Normal appearance.   HENT:      Head: Normocephalic and atraumatic.   Cardiovascular:      Rate and Rhythm: Normal rate and regular rhythm.      Heart sounds: No murmur heard.  Pulmonary:      Effort: Pulmonary effort is normal.      Breath sounds: Normal breath sounds. No stridor. No wheezing, rhonchi or rales.   Musculoskeletal:         General: Normal range of motion.   Skin:     General: Skin is warm and dry.   Neurological:      General: No focal deficit present.      Mental Status: He is alert and oriented to person, place, and time.   Psychiatric:         Mood and Affect: Mood normal.         Behavior: Behavior normal.

## 2023-12-28 ENCOUNTER — TELEPHONE (OUTPATIENT)
Dept: FAMILY MEDICINE | Facility: OTHER | Age: 53
End: 2023-12-28

## 2023-12-28 ENCOUNTER — OFFICE VISIT (OUTPATIENT)
Dept: FAMILY MEDICINE | Facility: OTHER | Age: 53
End: 2023-12-28
Attending: STUDENT IN AN ORGANIZED HEALTH CARE EDUCATION/TRAINING PROGRAM
Payer: COMMERCIAL

## 2023-12-28 VITALS
OXYGEN SATURATION: 96 % | BODY MASS INDEX: 43.15 KG/M2 | DIASTOLIC BLOOD PRESSURE: 89 MMHG | WEIGHT: 315 LBS | SYSTOLIC BLOOD PRESSURE: 149 MMHG | HEART RATE: 80 BPM | TEMPERATURE: 97.2 F

## 2023-12-28 DIAGNOSIS — N52.03 COMBINED ARTERIAL INSUFFICIENCY AND CORPORO-VENOUS OCCLUSIVE ERECTILE DYSFUNCTION: ICD-10-CM

## 2023-12-28 DIAGNOSIS — I10 BENIGN ESSENTIAL HYPERTENSION: ICD-10-CM

## 2023-12-28 DIAGNOSIS — Z12.5 SCREENING FOR MALIGNANT NEOPLASM OF PROSTATE: ICD-10-CM

## 2023-12-28 DIAGNOSIS — E78.5 HYPERLIPIDEMIA LDL GOAL <100: ICD-10-CM

## 2023-12-28 DIAGNOSIS — E66.01 MORBID OBESITY DUE TO EXCESS CALORIES (H): ICD-10-CM

## 2023-12-28 DIAGNOSIS — E11.9 TYPE 2 DIABETES MELLITUS WITHOUT COMPLICATION, WITHOUT LONG-TERM CURRENT USE OF INSULIN (H): Primary | ICD-10-CM

## 2023-12-28 LAB
ANION GAP SERPL CALCULATED.3IONS-SCNC: 12 MMOL/L (ref 7–15)
BUN SERPL-MCNC: 10.1 MG/DL (ref 6–20)
CALCIUM SERPL-MCNC: 9.5 MG/DL (ref 8.6–10)
CHLORIDE SERPL-SCNC: 103 MMOL/L (ref 98–107)
CREAT SERPL-MCNC: 0.99 MG/DL (ref 0.67–1.17)
DEPRECATED HCO3 PLAS-SCNC: 25 MMOL/L (ref 22–29)
EGFRCR SERPLBLD CKD-EPI 2021: >90 ML/MIN/1.73M2
EST. AVERAGE GLUCOSE BLD GHB EST-MCNC: 137 MG/DL
GLUCOSE SERPL-MCNC: 120 MG/DL (ref 70–99)
HBA1C MFR BLD: 6.4 %
POTASSIUM SERPL-SCNC: 3.9 MMOL/L (ref 3.4–5.3)
PSA SERPL DL<=0.01 NG/ML-MCNC: 0.63 NG/ML (ref 0–3.5)
SODIUM SERPL-SCNC: 140 MMOL/L (ref 135–145)

## 2023-12-28 PROCEDURE — G0103 PSA SCREENING: HCPCS | Performed by: STUDENT IN AN ORGANIZED HEALTH CARE EDUCATION/TRAINING PROGRAM

## 2023-12-28 PROCEDURE — 83036 HEMOGLOBIN GLYCOSYLATED A1C: CPT | Performed by: STUDENT IN AN ORGANIZED HEALTH CARE EDUCATION/TRAINING PROGRAM

## 2023-12-28 PROCEDURE — 99214 OFFICE O/P EST MOD 30 MIN: CPT | Performed by: STUDENT IN AN ORGANIZED HEALTH CARE EDUCATION/TRAINING PROGRAM

## 2023-12-28 PROCEDURE — 80048 BASIC METABOLIC PNL TOTAL CA: CPT | Performed by: STUDENT IN AN ORGANIZED HEALTH CARE EDUCATION/TRAINING PROGRAM

## 2023-12-28 PROCEDURE — 36415 COLL VENOUS BLD VENIPUNCTURE: CPT | Performed by: STUDENT IN AN ORGANIZED HEALTH CARE EDUCATION/TRAINING PROGRAM

## 2023-12-28 RX ORDER — AMLODIPINE BESYLATE 10 MG/1
10 TABLET ORAL DAILY
Qty: 90 TABLET | Refills: 1 | Status: SHIPPED | OUTPATIENT
Start: 2023-12-28 | End: 2024-07-22

## 2023-12-28 RX ORDER — SILDENAFIL 25 MG/1
25-100 TABLET, FILM COATED ORAL DAILY PRN
Qty: 60 TABLET | Refills: 0 | Status: SHIPPED | OUTPATIENT
Start: 2023-12-28 | End: 2024-03-19

## 2023-12-28 RX ORDER — LISINOPRIL 40 MG/1
40 TABLET ORAL DAILY
Qty: 90 TABLET | Refills: 3 | Status: SHIPPED | OUTPATIENT
Start: 2023-12-28

## 2023-12-28 ASSESSMENT — PAIN SCALES - GENERAL: PAINLEVEL: NO PAIN (0)

## 2023-12-29 RX ORDER — AMLODIPINE BESYLATE 10 MG/1
10 TABLET ORAL DAILY
Qty: 90 TABLET | Refills: 0 | OUTPATIENT
Start: 2023-12-29

## 2024-01-05 DIAGNOSIS — J30.2 CHRONIC SEASONAL ALLERGIC RHINITIS: Primary | ICD-10-CM

## 2024-01-08 RX ORDER — LORATADINE 10 MG/1
1 TABLET ORAL DAILY
Qty: 90 TABLET | Refills: 3 | Status: SHIPPED | OUTPATIENT
Start: 2024-01-08

## 2024-01-08 NOTE — TELEPHONE ENCOUNTER
Disp Refills Start End JAZMYN   loratadine (CLARITIN) 10 MG tablet 90 tablet 1 2/27/2023 -- No     Last Office Visit: 12/28/2023  Future Office visit:    Next 5 appointments (look out 90 days)      Apr 02, 2024  9:00 AM  (Arrive by 8:45 AM)  SHORT with Ildefonso Lopez MD  [facility] [place] ([facility] [place] ) [address]  [place] [address]  [phone]             Routing refill request to provider for review/approval because:

## 2024-01-16 DIAGNOSIS — R79.89 ELEVATED LFTS: ICD-10-CM

## 2024-01-16 DIAGNOSIS — J30.2 CHRONIC SEASONAL ALLERGIC RHINITIS: ICD-10-CM

## 2024-01-16 DIAGNOSIS — E78.5 HYPERLIPIDEMIA LDL GOAL <100: ICD-10-CM

## 2024-01-16 RX ORDER — PRAVASTATIN SODIUM 40 MG
40 TABLET ORAL DAILY
Qty: 90 TABLET | Refills: 1 | Status: SHIPPED | OUTPATIENT
Start: 2024-01-16 | End: 2024-07-22

## 2024-01-16 RX ORDER — CETIRIZINE HYDROCHLORIDE 10 MG/1
10 TABLET ORAL EVERY EVENING
Qty: 90 TABLET | Refills: 1 | Status: SHIPPED | OUTPATIENT
Start: 2024-01-16 | End: 2024-07-22

## 2024-01-16 NOTE — TELEPHONE ENCOUNTER
Zyrtec      Last Written Prescription Date:  10/17/23  Last Fill Quantity: 90,   # refills: 0  Last Office Visit: 12/28/23  Future Office visit:    Next 5 appointments (look out 90 days)      Apr 02, 2024  9:00 AM  (Arrive by 8:45 AM)  SHORT with Ildefonso Lopez MD  [facility] [place] ([facility] [place] ) [address]  [facility] [address]  [phone]             Routing refill request to provider for review/approval because:      Pravastatin      Last Written Prescription Date:  6/27/23  Last Fill Quantity: 90,   # refills: 1  Last Office Visit: 12/28/23  Future Office visit:    Next 5 appointments (look out 90 days)      Apr 02, 2024  9:00 AM  (Arrive by 8:45 AM)  SHORT with Ildefonso Lopez MD  [facility] [place] ([facility] [place] ) [address]  [place] [address]  [phone]             Routing refill request to provider for review/approval because:

## 2024-01-25 ENCOUNTER — TELEPHONE (OUTPATIENT)
Dept: FAMILY MEDICINE | Facility: OTHER | Age: 54
End: 2024-01-25

## 2024-01-25 NOTE — TELEPHONE ENCOUNTER
RECEIVED PA REQUEST FROM MARIANA FOR Ozempic (2 MG/DOSE) 8MG/3ML pen-injectors. SUBMITTED ON CMM, WAITING FOR RESPONSE.

## 2024-01-29 NOTE — TELEPHONE ENCOUNTER
RECEIVED PA APPROVAL FOR Ozempic (2 MG/DOSE) 8MG/3ML pen-injectors.  DATES 01/27/2024-01/26/2025. SCANNED INTO EPIC.

## 2024-02-07 DIAGNOSIS — M54.42 CHRONIC BILATERAL LOW BACK PAIN WITH LEFT-SIDED SCIATICA: ICD-10-CM

## 2024-02-07 DIAGNOSIS — G89.29 CHRONIC BILATERAL LOW BACK PAIN WITH LEFT-SIDED SCIATICA: ICD-10-CM

## 2024-02-07 RX ORDER — CYCLOBENZAPRINE HCL 10 MG
10 TABLET ORAL 3 TIMES DAILY PRN
Qty: 90 TABLET | Refills: 0 | Status: SHIPPED | OUTPATIENT
Start: 2024-02-07 | End: 2024-04-15

## 2024-02-07 NOTE — TELEPHONE ENCOUNTER
cyclobenzaprine (FLEXERIL) 10 MG tablet       Last Written Prescription Date:  9/27/23  Last Fill Quantity: 90,   # refills: 1  Last Office Visit: 12/28/23  Future Office visit:    Next 5 appointments (look out 90 days)      Apr 02, 2024  9:00 AM  (Arrive by 8:45 AM)  SHORT with Ildefonso Lopez MD  [facility] [place] ([facility] [place] ) [address]  [place] [address]  [phone]             Routing refill request to provider for review/approval because:

## 2024-03-19 DIAGNOSIS — N52.03 COMBINED ARTERIAL INSUFFICIENCY AND CORPORO-VENOUS OCCLUSIVE ERECTILE DYSFUNCTION: ICD-10-CM

## 2024-03-19 RX ORDER — SILDENAFIL 25 MG/1
TABLET, FILM COATED ORAL
Qty: 60 TABLET | Refills: 2 | Status: SHIPPED | OUTPATIENT
Start: 2024-03-19 | End: 2024-06-12

## 2024-03-28 NOTE — PROGRESS NOTES
Assessment & Plan     Type 2 diabetes mellitus without complication, without long-term current use of insulin (H)  Last A1c 6.4%.  Stable and controlled on current regimen.  No concerns, due for surveillance labs today.  - Basic metabolic panel  - Hemoglobin A1c  - Albumin Random Urine Quantitative with Creat Ratio  - ASA/statin/lisinopril  - Ozempic 2 mg weekly  - Metformin  - Diabetic eye exam 9/23  - Diabetic foot exam 4/2/2024  - Non-smoker    Hyperlipidemia LDL goal <100  Due for annual lipid check next visit  - Pravastatin 40 mg daily    Benign essential hypertension  High normal today but improved after increasing lisinopril last visit.  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  Started trial of sildenafil - adequate results on 100 mg dosing and without side effects.  PSA reassuring at that check, will do screening urine today.  - UA Macroscopic with reflex to Microscopic and Culture  - Sildenafil 100 mg as needed    I spent a total of 32 minutes on the day of the visit.   Time spent by me doing chart review, history and exam, documentation and further activities per the note      Follow-up in about 3 months for annual physical/healthcare maintenance review.    Subjective   Juan is a 54 year old, presenting for the following health issues:  Diabetes        4/2/2024     8:40 AM   Additional Questions   Roomed by YOVANI Parrish CMA   Accompanied by Self         4/2/2024     8:40 AM   Patient Reported Additional Medications   Patient reports taking the following new medications None     HPI     Diabetes Follow-up  How often are you checking your blood sugar? Not at all  What concerns do you have today about your diabetes? None   Do you have any of these symptoms? (Select all that apply)  No numbness or tingling in feet.  No redness, sores or blisters on feet.  No  "complaints of excessive thirst.  No reports of blurry vision.  No significant changes to weight.  -Continues on Ozempic and metformin  -No concerns  -No side effects  -Continued gradual weight loss  -Not monitoring sugars at home    BP Readings from Last 2 Encounters:   04/02/24 138/82   12/28/23 (!) 149/89     Hemoglobin A1C (%)   Date Value   12/28/2023 6.4 (H)   09/27/2023 6.5 (H)   03/22/2021 6.5 (H)   01/14/2021 8.1 (H)     LDL Cholesterol Calculated (mg/dL)   Date Value   06/27/2023 83   10/07/2022 75   01/14/2021 91   07/13/2020 89     Hypertension Follow-up  Do you check your blood pressure regularly outside of the clinic? No   Are you following a low salt diet? No  Are your blood pressures ever more than 140 on the top number (systolic) OR more   than 90 on the bottom number (diastolic), for example 140/90? No  -Increased lisinopril to 40 mg last visit  -Not checking at home  -Denies any side effects from dose change    Erectile dysfunction  -Discussed last visit  -Started on trial of sildenafil  -Started at low-dose, did not notice any effect  -Did get up to 100 mg with adequate results  -No side effects including headache, dizziness, lightheadedness  -Satisfied with current management  -Able to check urine today      Review of Systems  Constitutional, HEENT, cardiovascular, pulmonary, gi and gu systems are negative, except as otherwise noted.      Objective    /82   Pulse 78   Temp 97.2  F (36.2  C) (Tympanic)   Resp 18   Ht 1.88 m (6' 2\")   Wt 149.7 kg (330 lb)   SpO2 100%   BMI 42.37 kg/m    Body mass index is 42.37 kg/m .  Physical Exam  Vitals reviewed.   Constitutional:       Appearance: Normal appearance.   HENT:      Head: Normocephalic and atraumatic.   Cardiovascular:      Rate and Rhythm: Normal rate and regular rhythm.      Heart sounds: No murmur heard.  Pulmonary:      Effort: Pulmonary effort is normal.      Breath sounds: Normal breath sounds. No stridor. No wheezing, rhonchi " or rales.   Musculoskeletal:         General: Normal range of motion.   Skin:     General: Skin is warm and dry.   Neurological:      General: No focal deficit present.      Mental Status: He is alert and oriented to person, place, and time.   Psychiatric:         Mood and Affect: Mood normal.         Behavior: Behavior normal.        Diabetic Foot Screen:  Any complaints of increased pain or numbness ? No  Is there a foot ulcer now or a history of foot ulcer? No  Does the foot have an abnormal shape? No  Are the nails thick, too long or ingrown? No  Are there any redness or open areas? No         Sensation Testing done at all points on the diagram with monofilament     Right Foot: Sensation Normal at all points  Left Foot: Sensation Normal at all points     Risk Category: 0- No loss of protective sensation  Performed by           Signed Electronically by: Ildefonso Lopez MD

## 2024-04-02 ENCOUNTER — OFFICE VISIT (OUTPATIENT)
Dept: FAMILY MEDICINE | Facility: OTHER | Age: 54
End: 2024-04-02
Attending: STUDENT IN AN ORGANIZED HEALTH CARE EDUCATION/TRAINING PROGRAM
Payer: COMMERCIAL

## 2024-04-02 VITALS
TEMPERATURE: 97.2 F | RESPIRATION RATE: 18 BRPM | DIASTOLIC BLOOD PRESSURE: 82 MMHG | WEIGHT: 315 LBS | HEIGHT: 74 IN | HEART RATE: 78 BPM | BODY MASS INDEX: 40.43 KG/M2 | OXYGEN SATURATION: 100 % | SYSTOLIC BLOOD PRESSURE: 138 MMHG

## 2024-04-02 DIAGNOSIS — I10 BENIGN ESSENTIAL HYPERTENSION: ICD-10-CM

## 2024-04-02 DIAGNOSIS — N52.03 COMBINED ARTERIAL INSUFFICIENCY AND CORPORO-VENOUS OCCLUSIVE ERECTILE DYSFUNCTION: ICD-10-CM

## 2024-04-02 DIAGNOSIS — E78.5 HYPERLIPIDEMIA LDL GOAL <100: ICD-10-CM

## 2024-04-02 DIAGNOSIS — E11.9 TYPE 2 DIABETES MELLITUS WITHOUT COMPLICATION, WITHOUT LONG-TERM CURRENT USE OF INSULIN (H): Primary | ICD-10-CM

## 2024-04-02 LAB
ALBUMIN UR-MCNC: 10 MG/DL
ANION GAP SERPL CALCULATED.3IONS-SCNC: 11 MMOL/L (ref 7–15)
APPEARANCE UR: CLEAR
BILIRUB UR QL STRIP: NEGATIVE
BUN SERPL-MCNC: 12.2 MG/DL (ref 6–20)
CALCIUM SERPL-MCNC: 10 MG/DL (ref 8.6–10)
CHLORIDE SERPL-SCNC: 102 MMOL/L (ref 98–107)
COLOR UR AUTO: YELLOW
CREAT SERPL-MCNC: 1.06 MG/DL (ref 0.67–1.17)
CREAT UR-MCNC: 212.2 MG/DL
DEPRECATED HCO3 PLAS-SCNC: 25 MMOL/L (ref 22–29)
EGFRCR SERPLBLD CKD-EPI 2021: 84 ML/MIN/1.73M2
EST. AVERAGE GLUCOSE BLD GHB EST-MCNC: 146 MG/DL
GLUCOSE SERPL-MCNC: 115 MG/DL (ref 70–99)
GLUCOSE UR STRIP-MCNC: NEGATIVE MG/DL
HBA1C MFR BLD: 6.7 %
HGB UR QL STRIP: NEGATIVE
KETONES UR STRIP-MCNC: NEGATIVE MG/DL
LEUKOCYTE ESTERASE UR QL STRIP: NEGATIVE
MICROALBUMIN UR-MCNC: 25.3 MG/L
MICROALBUMIN/CREAT UR: 11.92 MG/G CR (ref 0–17)
MUCOUS THREADS #/AREA URNS LPF: PRESENT /LPF
NITRATE UR QL: NEGATIVE
PH UR STRIP: 5.5 [PH] (ref 4.7–8)
POTASSIUM SERPL-SCNC: 4.3 MMOL/L (ref 3.4–5.3)
RBC URINE: <1 /HPF
SODIUM SERPL-SCNC: 138 MMOL/L (ref 135–145)
SP GR UR STRIP: 1.02 (ref 1–1.03)
SQUAMOUS EPITHELIAL: 0 /HPF
UROBILINOGEN UR STRIP-MCNC: NORMAL MG/DL
WBC URINE: 1 /HPF

## 2024-04-02 PROCEDURE — 36415 COLL VENOUS BLD VENIPUNCTURE: CPT | Performed by: STUDENT IN AN ORGANIZED HEALTH CARE EDUCATION/TRAINING PROGRAM

## 2024-04-02 PROCEDURE — 81001 URINALYSIS AUTO W/SCOPE: CPT | Performed by: STUDENT IN AN ORGANIZED HEALTH CARE EDUCATION/TRAINING PROGRAM

## 2024-04-02 PROCEDURE — 83036 HEMOGLOBIN GLYCOSYLATED A1C: CPT | Performed by: STUDENT IN AN ORGANIZED HEALTH CARE EDUCATION/TRAINING PROGRAM

## 2024-04-02 PROCEDURE — 80048 BASIC METABOLIC PNL TOTAL CA: CPT | Performed by: STUDENT IN AN ORGANIZED HEALTH CARE EDUCATION/TRAINING PROGRAM

## 2024-04-02 PROCEDURE — 99214 OFFICE O/P EST MOD 30 MIN: CPT | Performed by: STUDENT IN AN ORGANIZED HEALTH CARE EDUCATION/TRAINING PROGRAM

## 2024-04-02 PROCEDURE — 82043 UR ALBUMIN QUANTITATIVE: CPT | Performed by: STUDENT IN AN ORGANIZED HEALTH CARE EDUCATION/TRAINING PROGRAM

## 2024-04-02 PROCEDURE — 82570 ASSAY OF URINE CREATININE: CPT | Performed by: STUDENT IN AN ORGANIZED HEALTH CARE EDUCATION/TRAINING PROGRAM

## 2024-04-02 ASSESSMENT — PATIENT HEALTH QUESTIONNAIRE - PHQ9
SUM OF ALL RESPONSES TO PHQ QUESTIONS 1-9: 0
SUM OF ALL RESPONSES TO PHQ QUESTIONS 1-9: 0
10. IF YOU CHECKED OFF ANY PROBLEMS, HOW DIFFICULT HAVE THESE PROBLEMS MADE IT FOR YOU TO DO YOUR WORK, TAKE CARE OF THINGS AT HOME, OR GET ALONG WITH OTHER PEOPLE: NOT DIFFICULT AT ALL

## 2024-04-02 ASSESSMENT — PAIN SCALES - GENERAL: PAINLEVEL: NO PAIN (0)

## 2024-04-15 DIAGNOSIS — M54.42 CHRONIC BILATERAL LOW BACK PAIN WITH LEFT-SIDED SCIATICA: ICD-10-CM

## 2024-04-15 DIAGNOSIS — G89.29 CHRONIC BILATERAL LOW BACK PAIN WITH LEFT-SIDED SCIATICA: ICD-10-CM

## 2024-04-15 RX ORDER — CYCLOBENZAPRINE HCL 10 MG
10 TABLET ORAL 3 TIMES DAILY PRN
Qty: 90 TABLET | Refills: 0 | Status: SHIPPED | OUTPATIENT
Start: 2024-04-15 | End: 2024-08-07

## 2024-04-15 NOTE — TELEPHONE ENCOUNTER
CYCLOBENZAPRINE 10 MG TABLET       Last Written Prescription Date:  2/7/24  Last Fill Quantity: 90,   # refills: 0  Last Office Visit: 4/2/24  Future Office visit:    Next 5 appointments (look out 90 days)      Jul 02, 2024  8:30 AM  (Arrive by 8:15 AM)  Adult Preventative Visit with Ildefonso Lopez MD  [facility] ([facility] [place] ) [address]  [place] [address]  [phone]             Routing refill request to provider for review/approval because:  Drug not on the [facility], [facility] or [facility] refill protocol or controlled substance

## 2024-05-26 ENCOUNTER — HOSPITAL ENCOUNTER (EMERGENCY)
Facility: HOSPITAL | Age: 54
Discharge: HOME | End: 2024-05-26
Attending: NURSE PRACTITIONER | Admitting: NURSE PRACTITIONER
Payer: COMMERCIAL

## 2024-05-26 VITALS
TEMPERATURE: 97.7 F | HEART RATE: 77 BPM | SYSTOLIC BLOOD PRESSURE: 160 MMHG | RESPIRATION RATE: 16 BRPM | DIASTOLIC BLOOD PRESSURE: 100 MMHG | OXYGEN SATURATION: 95 %

## 2024-05-26 DIAGNOSIS — M54.42 ACUTE LEFT-SIDED LOW BACK PAIN WITH LEFT-SIDED SCIATICA: Primary | ICD-10-CM

## 2024-05-26 PROCEDURE — G0463 HOSPITAL OUTPT CLINIC VISIT: HCPCS

## 2024-05-26 PROCEDURE — 99213 OFFICE O/P EST LOW 20 MIN: CPT | Performed by: NURSE PRACTITIONER

## 2024-05-26 RX ORDER — CYCLOBENZAPRINE HCL 10 MG
10 TABLET ORAL 3 TIMES DAILY PRN
Qty: 21 TABLET | Refills: 0 | Status: SHIPPED | OUTPATIENT
Start: 2024-05-26 | End: 2024-07-02

## 2024-05-26 RX ORDER — PREDNISONE 20 MG/1
TABLET ORAL
Qty: 10 TABLET | Refills: 0 | Status: SHIPPED | OUTPATIENT
Start: 2024-05-26 | End: 2024-07-02

## 2024-05-26 ASSESSMENT — COLUMBIA-SUICIDE SEVERITY RATING SCALE - C-SSRS
1. IN THE PAST MONTH, HAVE YOU WISHED YOU WERE DEAD OR WISHED YOU COULD GO TO SLEEP AND NOT WAKE UP?: NO
6. HAVE YOU EVER DONE ANYTHING, STARTED TO DO ANYTHING, OR PREPARED TO DO ANYTHING TO END YOUR LIFE?: NO
2. HAVE YOU ACTUALLY HAD ANY THOUGHTS OF KILLING YOURSELF IN THE PAST MONTH?: NO

## 2024-05-26 ASSESSMENT — ENCOUNTER SYMPTOMS: BACK PAIN: 1

## 2024-05-26 NOTE — ED PROVIDER NOTES
History     Chief Complaint   Patient presents with    Hip Pain     HPI  Jason Duncan is a 54 year old male who presents ambulatory to urgent care for evaluation of left-sided low back pain radiating down his left leg.  History of sciatica to the same side home.  Symptoms started initially about 2 weeks ago when it progressively worsened.  Pain is disturbing his sleep.  He denies saddle paresthesias, bowel bladder incontinence or urinary retention.  Ambulating with a limp.  Denies any recent trauma or injury to his back.  He has never had surgeries to his back.  No fevers or chills.  Has been taking ibuprofen which she states does not really help with pain.    Reports last time he did have this he was treated with Flexeril and prednisone.  He was also on Neurontin which she stopped taking when his symptoms resolved.  Last A1c was 6.4%.  Does not regularly check his blood sugars but notes tolerating prednisone the last time.    Allergies:  No Known Allergies    Problem List:    Patient Active Problem List    Diagnosis Date Noted    NAFL (nonalcoholic fatty liver) 09/27/2023     Priority: Medium    Chronic bilateral low back pain with left-sided sciatica 09/27/2023     Priority: Medium    Itching 02/27/2023     Priority: Medium    Elevated LFTs 02/27/2023     Priority: Medium    Chronic seasonal allergic rhinitis 02/27/2023     Priority: Medium    Idiopathic gout, unspecified chronicity, unspecified site 02/27/2023     Priority: Medium    Arthritis 02/27/2023     Priority: Medium    Primary osteoarthritis of left knee 02/21/2020     Priority: Medium    Hyperlipidemia LDL goal <100 09/16/2019     Priority: Medium    Primary insomnia 09/16/2019     Priority: Medium    Chronic gout of multiple sites; feet 09/16/2019     Priority: Medium    Type 2 diabetes mellitus without complication, without long-term current use of insulin (H) 07/10/2018     Priority: Medium    Vitamin D deficiency 04/17/2018     Priority: Medium     RACHELE (obstructive sleep apnea); on cpap 05/30/2017     Priority: Medium    Morbid obesity due to excess calories (H) 09/07/2016     Priority: Medium    Gastric band slippage 09/01/2015     Priority: Medium    Mixed hearing loss, unilateral 11/20/2013     Priority: Medium    Status post bariatric surgery; Lap band now removed 09/25/2008     Priority: Medium    Benign essential hypertension 06/09/2008     Priority: Medium        Past Medical History:    Past Medical History:   Diagnosis Date    Allergic rhinitis     Benign essential hypertension 6/9/2008    Chronic gout of multiple sites 9/16/2019    Cramp of limb 4/17/2018    Encounter for monitoring statin therapy 6/28/2016    Hyperlipidemia LDL goal <100 9/16/2019    RACHELE (obstructive sleep apnea)     Primary insomnia 9/16/2019    Primary osteoarthritis of left knee 2/21/2020    Type 2 diabetes mellitus without complication, without long-term current use of insulin (H) 7/10/2018    Vitamin D deficiency 4/17/2018       Past Surgical History:    Past Surgical History:   Procedure Laterality Date    ARTHROSCOPY KNEE Left 3/30/2021    Procedure: LEFT KNEE ARTHROSCOPY:PARTIAL MEDIAL MENISECTOMY, PARTIAL LATERAL MENISECTOMY;  Surgeon: Donny Au MD;  Location: [facility] OR    ARTHROSCOPY KNEE Right 2/3/2023    Procedure: Right Knee Arthroscopy, Partial Medial Menisectomy, Extensive Debridement, Loose Body Removal, Chondroplasty;  Surgeon: Avinash Hampton MD;  Location: [facility] OR    GASTRIC RESTRICTIVE PROCEDURE, OPEN, REMOVE/REPLACE SUBCUTANEOUS PORT  2008    GI SURGERY  2015    removal of lap band     lap band  2008    nasal septoplasty      SEPTOPLASTY      Nasal       Family History:    Family History   Problem Relation Age of Onset    Dementia Mother 72        Cause of death    Diabetes Father     Respiratory Father         COPD    Cardiovascular Father         CHF       Social History:  Marital Status:   [2]  Social History     Tobacco Use    Smoking status: Never      Passive exposure: Never    Smokeless tobacco: Never   Vaping Use    Vaping status: Never Used   Substance Use Topics    Alcohol use: Yes     Comment: rarely    Drug use: No        Medications:    cyclobenzaprine (FLEXERIL) 10 MG tablet  predniSONE (DELTASONE) 20 MG tablet  amLODIPine (NORVASC) 10 MG tablet  aspirin 81 MG tablet  cetirizine (ZYRTEC) 10 MG tablet  cyclobenzaprine (FLEXERIL) 10 MG tablet  ibuprofen (ADVIL/MOTRIN) 800 MG tablet  indomethacin (INDOCIN) 25 MG capsule  lisinopril (ZESTRIL) 40 MG tablet  loratadine (CLARITIN) 10 MG tablet  metFORMIN (GLUCOPHAGE XR) 500 MG 24 hr tablet  montelukast (SINGULAIR) 10 MG tablet  multivitamin w/minerals (THERA-VIT-M) tablet  order for DME  pravastatin (PRAVACHOL) 40 MG tablet  Semaglutide, 2 MG/DOSE, (OZEMPIC) 8 MG/3ML pen  sildenafil (VIAGRA) 25 MG tablet  VITAMIN D, CHOLECALCIFEROL, PO          Review of Systems   Genitourinary:  Negative for decreased urine volume.   Musculoskeletal:  Positive for back pain and gait problem.   All other systems reviewed and are negative.      Physical Exam   BP: 160/100  Pulse: 77  Temp: 97.7  F (36.5  C)  Resp: 16  SpO2: 95 %      Physical Exam  Vitals and nursing note reviewed.   Constitutional:       General: He is not in acute distress.     Appearance: He is well-developed. He is not diaphoretic.   HENT:      Head: Normocephalic and atraumatic.   Eyes:      Pupils: Pupils are equal, round, and reactive to light.   Cardiovascular:      Rate and Rhythm: Normal rate.   Pulmonary:      Effort: Pulmonary effort is normal.   Musculoskeletal:      Cervical back: Normal, normal range of motion and neck supple.      Thoracic back: Tenderness present. No bony tenderness.      Lumbar back: Tenderness present. No swelling, edema, deformity or bony tenderness. Negative right straight leg raise test and negative left straight leg raise test.      Comments: No step-offs or edema.  Paralumbar tenderness to palpation.  Negative straight  leg raise test.   Skin:     General: Skin is warm and dry.      Coloration: Skin is not pale.   Neurological:      Mental Status: He is alert and oriented to person, place, and time.         ED Course        Procedures         No results found for this or any previous visit (from the past 24 hour(s)).    Medications - No data to display    Assessments & Plan (with Medical Decision Making)   A pleasant 54-year-old male that presented for evaluation of left-sided low back pain radiating down his left leg with symptoms having that she started 2 weeks ago and progressively worsened.  Negative straight leg raise test.  No step-offs or edema.  He has paralumbar tenderness to palpation.  Symptoms are most consistent with left-sided sciatica.  Offered pain medication during this visit which patient declined.    He will be treated with Flexeril and prednisone which she notes is tolerated well in the past when he had a similar flareup.  Recommended cold compresses to the back for 20 minutes at a time.  Follow-up with primary doctor if no improvement in symptoms.  Return to urgent care or emergency room for any worsening or concerning symptoms.    I have reviewed the nursing notes.    I have reviewed the findings, diagnosis, plan and need for follow up with the patient.  This document was prepared using a combination of typing and voice generated software.  While every attempt was made for accuracy, spelling and grammatical errors may exist.         New Prescriptions    CYCLOBENZAPRINE (FLEXERIL) 10 MG TABLET    Take 1 tablet (10 mg) by mouth 3 times daily as needed for muscle spasms    PREDNISONE (DELTASONE) 20 MG TABLET    Take two tablets (= 40mg) each day for 5 (five) days       Final diagnoses:   Acute left-sided low back pain with left-sided sciatica       5/26/2024   [facility]       Mpofu, Prudence, CNP  05/26/24 1101

## 2024-05-26 NOTE — DISCHARGE INSTRUCTIONS
Take the muscle relaxant and steroids as prescribed.  Apply cold packs to your back for 20 minutes at a time.    Follow-up with your primary doctor as needed if symptoms are not improving.    Return to urgent care or emergency room for any worsening or concerning symptoms.

## 2024-06-03 NOTE — NURSING NOTE
"  Chief Complaint   Patient presents with     RECHECK     Follow up MRI of right knee. Workmans comp case.       Initial /70 (BP Location: Left arm, Patient Position: Chair, Cuff Size: Adult Large)  Pulse 79  Temp 97.6  F (36.4  C) (Tympanic)  Ht 6' 2.5\" (1.892 m)  Wt (!) 324 lb (147 kg)  SpO2 97%  BMI 41.04 kg/m2 Estimated body mass index is 41.04 kg/(m^2) as calculated from the following:    Height as of this encounter: 6' 2.5\" (1.892 m).    Weight as of this encounter: 324 lb (147 kg).  Medication Reconciliation: complete   Bozena Cardona LPN      " [FreeTextEntry1] : Counseled the patient on constipation and how it can affect the urinary tract. We discussed increasing water intake, daily fruits and vegetables, and sources of fiber.  We recommended 4 servings of whole fruit per day, excluding dried fruit or juices.  We also recommended supplementing soluble fiber intake with gummy fiber #2/day.  urinary retention start tamsulosin 0.4 mg qhs Patient counseled on the r/b/a of alpha blocker, including risks of falls/light headed symptoms  we decided against a TOV today and will bring her back in the morning in 1-2 weeks after starting tamsulosin  her catheter is <4 weeks old

## 2024-06-10 NOTE — PROGRESS NOTES
Assessment & Plan     Chronic bilateral low back pain with left-sided sciatica  Acute on chronic low back pain with persistent left-sided sciatica, minimal improvement after a course of prednisone.  No red flag symptoms, but still quite debilitating.  MRI last fall with multilevel disc disease and degenerative changes.  Would like to try consultation for possible AKASH, did discuss revisiting spine orthopedics as well.  Continue to optimize supportive cares in the meantime and well aware of red flag symptoms that warrant emergent evaluation.  - IR Consultation for IR Exam (Pain Consult); Future    Combined arterial insufficiency and corporo-venous occlusive erectile dysfunction  RF. Effective, no side effects.  - sildenafil (VIAGRA) 100 MG tablet; Take 1 tablet (100 mg) by mouth daily as needed (Take 30-60 minutes before intercourse.)        MED REC REQUIRED  Post Medication Reconciliation Status:  Discharge medications reconciled and changed, see notes/orders    Follow-up as needed.    Kyleigh Martinez is a 54 year old, presenting for the following health issues:  ER F/U ([facility] sciatica)        6/12/2024     9:49 AM   Additional Questions   Roomed by Kelby Croft LPN   Accompanied by Self         6/12/2024     9:49 AM   Patient Reported Additional Medications   Patient reports taking the following new medications Prednisone after visit, now done with medication     HPI     ED/UC Followup:    Facility:  [facility] [place]   Date of visit: 5/26/2024  Reason for visit: Acute left-sided low back pain with left-sided sciatica   Current Status: Prednisone did not help, still having pain from lower back down to ankle.    -ED visit 5/26  -Started on course of prednisone and Flexeril  -Meds did not really do much  -Overall little bit better but still barely tolerable  -A lot of low back and posterior hip discomfort particularly at night  -Lot of left leg pain although down to the ankle during the daytime with activity  -Exacerbated with  "prematurity movement or position  -No leg weakness  -No bowel/bladder issues, no saddle paresthesias  -Sitting on a hard chair with good support actually seems to help a little bit  -Really flares up if sitting in the truck too long  -With a hard time getting in and out of vehicles  -Did have MRI last October  -Would like to try a cortisone shot if possible    Review of Systems  Constitutional, HEENT, cardiovascular, pulmonary, gi and gu systems are negative, except as otherwise noted.      Objective    /86   Pulse 82   Temp (!) 96.7  F (35.9  C) (Tympanic)   Resp 16   Ht 1.88 m (6' 2\")   Wt (!) 153.6 kg (338 lb 9.6 oz)   SpO2 96%   BMI 43.47 kg/m    Body mass index is 43.47 kg/m .  Physical Exam  Vitals reviewed.   Constitutional:       General: He is not in acute distress.     Appearance: Normal appearance. He is not toxic-appearing.   HENT:      Head: Normocephalic and atraumatic.   Musculoskeletal:         General: Normal range of motion.      Lumbar back: No swelling or deformity. Negative right straight leg raise test and negative left straight leg raise test.      Comments: Low back/spine.  No gross deformity/deficits, guarded somewhat rigid movements.  Strength lower extremities intact symmetrically.  Guarded gait.   Skin:     General: Skin is warm and dry.   Neurological:      General: No focal deficit present.      Mental Status: He is alert and oriented to person, place, and time.   Psychiatric:         Mood and Affect: Mood normal.         Behavior: Behavior normal.       Signed Electronically by: Ildefonso Lopez MD    "

## 2024-06-11 DIAGNOSIS — E11.9 TYPE 2 DIABETES MELLITUS WITHOUT COMPLICATION, WITHOUT LONG-TERM CURRENT USE OF INSULIN (H): ICD-10-CM

## 2024-06-11 RX ORDER — METFORMIN HCL 500 MG
TABLET, EXTENDED RELEASE 24 HR ORAL
Qty: 360 TABLET | Refills: 2 | Status: SHIPPED | OUTPATIENT
Start: 2024-06-11

## 2024-06-11 NOTE — TELEPHONE ENCOUNTER
Metformin (Glucophage XR) 500 MG 24 hr tablet    Last Written Prescription Date:  11/21/2023  Last Fill Quantity: 360,   # refills: 0  Last Office Visit: 04/02/2024

## 2024-06-12 ENCOUNTER — OFFICE VISIT (OUTPATIENT)
Dept: FAMILY MEDICINE | Facility: OTHER | Age: 54
End: 2024-06-12
Attending: STUDENT IN AN ORGANIZED HEALTH CARE EDUCATION/TRAINING PROGRAM
Payer: COMMERCIAL

## 2024-06-12 ENCOUNTER — TELEPHONE (OUTPATIENT)
Dept: INTERVENTIONAL RADIOLOGY/VASCULAR | Facility: HOSPITAL | Age: 54
End: 2024-06-12

## 2024-06-12 VITALS
OXYGEN SATURATION: 96 % | SYSTOLIC BLOOD PRESSURE: 134 MMHG | RESPIRATION RATE: 16 BRPM | TEMPERATURE: 96.7 F | DIASTOLIC BLOOD PRESSURE: 86 MMHG | HEIGHT: 74 IN | WEIGHT: 315 LBS | BODY MASS INDEX: 40.43 KG/M2 | HEART RATE: 82 BPM

## 2024-06-12 DIAGNOSIS — M54.42 CHRONIC BILATERAL LOW BACK PAIN WITH LEFT-SIDED SCIATICA: Primary | ICD-10-CM

## 2024-06-12 DIAGNOSIS — G89.29 CHRONIC BILATERAL LOW BACK PAIN WITH LEFT-SIDED SCIATICA: Primary | ICD-10-CM

## 2024-06-12 DIAGNOSIS — N52.03 COMBINED ARTERIAL INSUFFICIENCY AND CORPORO-VENOUS OCCLUSIVE ERECTILE DYSFUNCTION: ICD-10-CM

## 2024-06-12 PROCEDURE — 99213 OFFICE O/P EST LOW 20 MIN: CPT | Performed by: STUDENT IN AN ORGANIZED HEALTH CARE EDUCATION/TRAINING PROGRAM

## 2024-06-12 RX ORDER — SILDENAFIL 100 MG/1
100 TABLET, FILM COATED ORAL DAILY PRN
Qty: 60 TABLET | Refills: 1 | Status: SHIPPED | OUTPATIENT
Start: 2024-06-12

## 2024-06-12 ASSESSMENT — PAIN SCALES - GENERAL: PAINLEVEL: SEVERE PAIN (7)

## 2024-06-15 ENCOUNTER — TRANSFERRED RECORDS (OUTPATIENT)
Dept: MULTI SPECIALTY CLINIC | Facility: CLINIC | Age: 54
End: 2024-06-15

## 2024-06-15 LAB — RETINOPATHY: NORMAL

## 2024-06-24 ENCOUNTER — TELEPHONE (OUTPATIENT)
Dept: FAMILY MEDICINE | Facility: OTHER | Age: 54
End: 2024-06-24

## 2024-06-24 NOTE — TELEPHONE ENCOUNTER
2:00 PM    Reason for Call: Phone Call    Description: Radiologist called reporting that steroid shot needed to be ordered for his back.    Was an appointment offered for this call? No  If yes : Appointment type              Date    Preferred method for responding to this message: Telephone Call  What is your phone number ?  [phone]    If we cannot reach you directly, may we leave a detailed response at the number you provided? Yes    Can this message wait until your PCP/provider returns, if available today? Not applicable    Janneth Rivas

## 2024-06-26 NOTE — TELEPHONE ENCOUNTER
I called patient, he spoke with the IR department a few days ago and was told that the provider needed to send in the IR requs

## 2024-07-02 ENCOUNTER — APPOINTMENT (OUTPATIENT)
Dept: LAB | Facility: OTHER | Age: 54
End: 2024-07-02
Attending: STUDENT IN AN ORGANIZED HEALTH CARE EDUCATION/TRAINING PROGRAM
Payer: COMMERCIAL

## 2024-07-02 ENCOUNTER — OFFICE VISIT (OUTPATIENT)
Dept: FAMILY MEDICINE | Facility: OTHER | Age: 54
End: 2024-07-02
Attending: STUDENT IN AN ORGANIZED HEALTH CARE EDUCATION/TRAINING PROGRAM
Payer: COMMERCIAL

## 2024-07-02 VITALS
HEART RATE: 71 BPM | WEIGHT: 315 LBS | DIASTOLIC BLOOD PRESSURE: 88 MMHG | SYSTOLIC BLOOD PRESSURE: 138 MMHG | BODY MASS INDEX: 40.43 KG/M2 | RESPIRATION RATE: 16 BRPM | OXYGEN SATURATION: 95 % | TEMPERATURE: 97.3 F | HEIGHT: 74 IN

## 2024-07-02 DIAGNOSIS — K76.0 NAFL (NONALCOHOLIC FATTY LIVER): ICD-10-CM

## 2024-07-02 DIAGNOSIS — E11.9 TYPE 2 DIABETES MELLITUS WITHOUT COMPLICATION, WITHOUT LONG-TERM CURRENT USE OF INSULIN (H): ICD-10-CM

## 2024-07-02 DIAGNOSIS — Z00.00 ROUTINE GENERAL MEDICAL EXAMINATION AT A HEALTH CARE FACILITY: Primary | ICD-10-CM

## 2024-07-02 DIAGNOSIS — Z00.00 HEALTHCARE MAINTENANCE: ICD-10-CM

## 2024-07-02 DIAGNOSIS — I10 BENIGN ESSENTIAL HYPERTENSION: ICD-10-CM

## 2024-07-02 DIAGNOSIS — E55.9 VITAMIN D DEFICIENCY: ICD-10-CM

## 2024-07-02 DIAGNOSIS — Z09 NEED FOR IMMUNIZATION FOLLOW-UP: ICD-10-CM

## 2024-07-02 DIAGNOSIS — E78.5 HYPERLIPIDEMIA LDL GOAL <100: ICD-10-CM

## 2024-07-02 LAB
ALBUMIN SERPL BCG-MCNC: 4.3 G/DL (ref 3.5–5.2)
ALP SERPL-CCNC: 67 U/L (ref 40–150)
ALT SERPL W P-5'-P-CCNC: 84 U/L (ref 0–70)
ANION GAP SERPL CALCULATED.3IONS-SCNC: 12 MMOL/L (ref 7–15)
AST SERPL W P-5'-P-CCNC: 47 U/L (ref 0–45)
BASOPHILS # BLD AUTO: 0.1 10E3/UL (ref 0–0.2)
BASOPHILS NFR BLD AUTO: 1 %
BILIRUB SERPL-MCNC: 0.4 MG/DL
BUN SERPL-MCNC: 12.8 MG/DL (ref 6–20)
CALCIUM SERPL-MCNC: 9.7 MG/DL (ref 8.6–10)
CHLORIDE SERPL-SCNC: 103 MMOL/L (ref 98–107)
CHOLEST SERPL-MCNC: 139 MG/DL
CREAT SERPL-MCNC: 1.12 MG/DL (ref 0.67–1.17)
DEPRECATED HCO3 PLAS-SCNC: 22 MMOL/L (ref 22–29)
EGFRCR SERPLBLD CKD-EPI 2021: 79 ML/MIN/1.73M2
EOSINOPHIL # BLD AUTO: 0.2 10E3/UL (ref 0–0.7)
EOSINOPHIL NFR BLD AUTO: 3 %
ERYTHROCYTE [DISTWIDTH] IN BLOOD BY AUTOMATED COUNT: 12.8 % (ref 10–15)
EST. AVERAGE GLUCOSE BLD GHB EST-MCNC: 140 MG/DL
FASTING STATUS PATIENT QL REPORTED: YES
FASTING STATUS PATIENT QL REPORTED: YES
GLUCOSE SERPL-MCNC: 109 MG/DL (ref 70–99)
HBA1C MFR BLD: 6.5 %
HCT VFR BLD AUTO: 43.8 % (ref 40–53)
HDLC SERPL-MCNC: 44 MG/DL
HGB BLD-MCNC: 15.5 G/DL (ref 13.3–17.7)
IMM GRANULOCYTES # BLD: 0 10E3/UL
IMM GRANULOCYTES NFR BLD: 0 %
LDLC SERPL CALC-MCNC: 73 MG/DL
LYMPHOCYTES # BLD AUTO: 1.4 10E3/UL (ref 0.8–5.3)
LYMPHOCYTES NFR BLD AUTO: 23 %
MCH RBC QN AUTO: 30.3 PG (ref 26.5–33)
MCHC RBC AUTO-ENTMCNC: 35.4 G/DL (ref 31.5–36.5)
MCV RBC AUTO: 86 FL (ref 78–100)
MONOCYTES # BLD AUTO: 0.6 10E3/UL (ref 0–1.3)
MONOCYTES NFR BLD AUTO: 10 %
NEUTROPHILS # BLD AUTO: 3.8 10E3/UL (ref 1.6–8.3)
NEUTROPHILS NFR BLD AUTO: 63 %
NONHDLC SERPL-MCNC: 95 MG/DL
NRBC # BLD AUTO: 0 10E3/UL
NRBC BLD AUTO-RTO: 0 /100
PLATELET # BLD AUTO: 319 10E3/UL (ref 150–450)
POTASSIUM SERPL-SCNC: 4 MMOL/L (ref 3.4–5.3)
PROT SERPL-MCNC: 7.1 G/DL (ref 6.4–8.3)
RBC # BLD AUTO: 5.11 10E6/UL (ref 4.4–5.9)
SODIUM SERPL-SCNC: 137 MMOL/L (ref 135–145)
TRIGL SERPL-MCNC: 111 MG/DL
TSH SERPL DL<=0.005 MIU/L-ACNC: 1.54 UIU/ML (ref 0.3–4.2)
VIT D+METAB SERPL-MCNC: 53 NG/ML (ref 20–50)
WBC # BLD AUTO: 6 10E3/UL (ref 4–11)

## 2024-07-02 PROCEDURE — 90715 TDAP VACCINE 7 YRS/> IM: CPT | Performed by: STUDENT IN AN ORGANIZED HEALTH CARE EDUCATION/TRAINING PROGRAM

## 2024-07-02 PROCEDURE — 80061 LIPID PANEL: CPT | Performed by: STUDENT IN AN ORGANIZED HEALTH CARE EDUCATION/TRAINING PROGRAM

## 2024-07-02 PROCEDURE — 99396 PREV VISIT EST AGE 40-64: CPT | Mod: 25 | Performed by: STUDENT IN AN ORGANIZED HEALTH CARE EDUCATION/TRAINING PROGRAM

## 2024-07-02 PROCEDURE — 82306 VITAMIN D 25 HYDROXY: CPT | Performed by: STUDENT IN AN ORGANIZED HEALTH CARE EDUCATION/TRAINING PROGRAM

## 2024-07-02 PROCEDURE — 90472 IMMUNIZATION ADMIN EACH ADD: CPT | Performed by: STUDENT IN AN ORGANIZED HEALTH CARE EDUCATION/TRAINING PROGRAM

## 2024-07-02 PROCEDURE — 36415 COLL VENOUS BLD VENIPUNCTURE: CPT | Performed by: STUDENT IN AN ORGANIZED HEALTH CARE EDUCATION/TRAINING PROGRAM

## 2024-07-02 PROCEDURE — 83036 HEMOGLOBIN GLYCOSYLATED A1C: CPT | Performed by: STUDENT IN AN ORGANIZED HEALTH CARE EDUCATION/TRAINING PROGRAM

## 2024-07-02 PROCEDURE — 90471 IMMUNIZATION ADMIN: CPT | Performed by: STUDENT IN AN ORGANIZED HEALTH CARE EDUCATION/TRAINING PROGRAM

## 2024-07-02 PROCEDURE — 80050 GENERAL HEALTH PANEL: CPT | Performed by: STUDENT IN AN ORGANIZED HEALTH CARE EDUCATION/TRAINING PROGRAM

## 2024-07-02 PROCEDURE — 90750 HZV VACC RECOMBINANT IM: CPT | Performed by: STUDENT IN AN ORGANIZED HEALTH CARE EDUCATION/TRAINING PROGRAM

## 2024-07-02 PROCEDURE — 99213 OFFICE O/P EST LOW 20 MIN: CPT | Mod: 25 | Performed by: STUDENT IN AN ORGANIZED HEALTH CARE EDUCATION/TRAINING PROGRAM

## 2024-07-02 SDOH — HEALTH STABILITY: PHYSICAL HEALTH: ON AVERAGE, HOW MANY DAYS PER WEEK DO YOU ENGAGE IN MODERATE TO STRENUOUS EXERCISE (LIKE A BRISK WALK)?: 6 DAYS

## 2024-07-02 SDOH — HEALTH STABILITY: PHYSICAL HEALTH: ON AVERAGE, HOW MANY MINUTES DO YOU ENGAGE IN EXERCISE AT THIS LEVEL?: PATIENT DECLINED

## 2024-07-02 ASSESSMENT — SOCIAL DETERMINANTS OF HEALTH (SDOH): HOW OFTEN DO YOU GET TOGETHER WITH FRIENDS OR RELATIVES?: PATIENT DECLINED

## 2024-07-02 ASSESSMENT — PAIN SCALES - GENERAL: PAINLEVEL: MILD PAIN (3)

## 2024-07-02 NOTE — PROGRESS NOTES
"Preventive Care Visit  [facility]  Ildefonso Lopez MD, Family Medicine  Jul 2, 2024      Assessment & Plan     Routine general medical examination at a health care facility  - CBC with platelets and differential  - Comprehensive metabolic panel (BMP + Alb, Alk Phos, ALT, AST, Total. Bili, TP)  - Lipid Profile (Chol, Trig, HDL, LDL calc)  - Hemoglobin A1c  - TSH with free T4 reflex  - Vitamin D Deficiency  - TDAP 7+ (ADACEL,BOOSTRIX)  - ZOSTER RECOMBINANT ADJUVANTED (SHINGRIX)  - Preventative screening guidelines provided in AVS  - Return in 1 year for annual physical    Healthcare maintenance  - BP: Normotensive  - BMI: Reviewed.  See below.  Estimated body mass index is 42.75 kg/m  as calculated from the following:    Height as of this encounter: 1.88 m (6' 2\").    Weight as of this encounter: 151 kg (333 lb).  - ASCVD risk screening: Update A1c/lipid today.  ASA/statin on board.   The 10-year ASCVD risk score (Silvia SANCHEZ, et al., 2019) is: 8.3%    Values used to calculate the score:      Age: 54 years      Sex: Male      Is Non- : No      Diabetic: Yes      Tobacco smoker: No      Systolic Blood Pressure: 138 mmHg      Is BP treated: Yes      HDL Cholesterol: 47 mg/dL      Total Cholesterol: 157 mg/dL  - Mood: No concerns.      4/2/2024     8:39 AM 6/27/2023     8:21 AM   PHQ-2 ( 1999 Pfizer)   Q1: Little interest or pleasure in doing things 3 0   Q2: Feeling down, depressed or hopeless 0 0   PHQ-2 Score 3 0   Q1: Little interest or pleasure in doing things Nearly every day    Q2: Feeling down, depressed or hopeless Not at all    PHQ-2 Score 3    - Tobacco/substance screening: No tobacco or substance.     Tobacco Use      Smoking status: Never        Passive exposure: Never      Smokeless tobacco: Never  - Infectious disease screening: Low risk; up to date  - Cancer screening:              -Family history:   -Lung: n/a   -Colon: Cologuard 1/1/2022; 3-year follow-up   -Prostate: PSA " "up-to-date 12/28/2023  - Immunizations: Update Tdap and shingles today.    Type 2 diabetes mellitus without complication, without long-term current use of insulin (H)  Controlled/stable, updating labs today.  Limited discussion about switching GLP1i to zepbound or Mounjaro as he has really plateaued for weight loss and BMI remains in the 40s.  - Lipid Profile (Chol, Trig, HDL, LDL calc)  - Hemoglobin A1c  - TSH with free T4 reflex  - ASA/statin/lisinopril  - Continue Ozempic 2 mg weekly for now  - Metformin  - Diabetic eye exam 9/23  - Diabetic foot exam 4/2/2024  - Non-smoker  - Follow-up follow-up in 3 months if he would like to switch to Zeppelin  - Otherwise follow-up for regular diabetes visit in 6 months    Hyperlipidemia LDL goal <100  - Pravastatin 40 mg daily  - Lipid Profile (Chol, Trig, HDL, LDL calc)    Benign essential hypertension  Controlled.  - CBC with platelets and differential  - Comprehensive metabolic panel (BMP + Alb, Alk Phos, ALT, AST, Total. Bili, TP)  - TSH with free T4 reflex  - Amlodipine 10 mg daily  - Lisinopril 40 mg daily    NAFL (nonalcoholic fatty liver)  - Comprehensive metabolic panel (BMP + Alb, Alk Phos, ALT, AST, Total. Bili, TP)  - TSH with free T4 reflex    Vitamin D deficiency  Currently taking 5000 IU daily for maintenance.  Update labs today.  - Vitamin D Deficiency    Need for immunization follow-up  - TDAP 7+ (ADACEL,BOOSTRIX)  - ZOSTER RECOMBINANT ADJUVANTED (SHINGRIX)      Patient has been advised of split billing requirements and indicates understanding: Yes        BMI  Estimated body mass index is 42.75 kg/m  as calculated from the following:    Height as of this encounter: 1.88 m (6' 2\").    Weight as of this encounter: 151 kg (333 lb).   Weight management plan: Discussed healthy diet and exercise guidelines    Counseling  Appropriate preventive services were discussed with this patient, including applicable screening as appropriate for fall prevention, nutrition, " physical activity, Tobacco-use cessation, weight loss and cognition.  Checklist reviewing preventive services available has been given to the patient.  Reviewed patient's diet, addressing concerns and/or questions.   The patient was instructed to see the dentist every 6 months.     Return in about 53 weeks (around 7/8/2025) for Annual Wellness Visit.    Kyleigh Martinez is a 54 year old, presenting for the following:  Physical (Annual Preventative)        7/2/2024     8:24 AM   Additional Questions   Roomed by Kelby Croft LPN   Accompanied by Self         7/2/2024     8:24 AM   Patient Reported Additional Medications   Patient reports taking the following new medications None        Health Care Directive  Patient does not have a Health Care Directive or Living Will: Discussed advance care planning with patient; however, patient declined at this time.    HPI    Diabetes Follow-up  How often are you checking your blood sugar? Not at all  What concerns do you have today about your diabetes? None   Do you have any of these symptoms? (Select all that apply)  No numbness or tingling in feet.  No redness, sores or blisters on feet.  No complaints of excessive thirst.  No reports of blurry vision.  No significant changes to weight.        Hyperlipidemia Follow-Up  Are you regularly taking any medication or supplement to lower your cholesterol?   Yes- Pravastatin  Are you having muscle aches or other side effects that you think could be caused by your cholesterol lowering medication?  No    Hypertension Follow-up  Do you check your blood pressure regularly outside of the clinic? No   Are you following a low salt diet? Yes  Are your blood pressures ever more than 140 on the top number (systolic) OR more   than 90 on the bottom number (diastolic), for example 140/90? No    BP Readings from Last 2 Encounters:   07/02/24 138/88   06/12/24 134/86     Hemoglobin A1C (%)   Date Value   04/02/2024 6.7 (H)   12/28/2023 6.4 (H)    03/22/2021 6.5 (H)   01/14/2021 8.1 (H)     LDL Cholesterol Calculated (mg/dL)   Date Value   06/27/2023 83   10/07/2022 75   01/14/2021 91   07/13/2020 89     Chronic/Recurring Back Pain Follow Up  Where is your back pain located? (Select all that apply) low back left  How would you describe your back pain?  dull ache  Where does your back pain spread? the left foot  Since your last clinic visit for back pain, how has your pain changed? unchanged  Does your back pain interfere with your job? YES once in awhile  Since your last visit, have you tried any new treatment? No has a back injection scheduled        7/2/2024   General Health   How would you rate your overall physical health? Good   Feel stress (tense, anxious, or unable to sleep) Not at all            7/2/2024   Nutrition   Three or more servings of calcium each day? Yes   Diet: Diabetic   How many servings of fruit and vegetables per day? (!) 2-3   How many sweetened beverages each day? (!) 2            7/2/2024   Exercise   Days per week of moderate/strenous exercise 6 days   Average minutes spent exercising at this level Patient declined            7/2/2024   Social Factors   Frequency of gathering with friends or relatives Patient declined   Worry food won't last until get money to buy more No   Food not last or not have enough money for food? No   Do you have housing? (Housing is defined as stable permanent housing and does not include staying ouside in a car, in a tent, in an abandoned building, in an overnight shelter, or couch-surfing.) Yes   Are you worried about losing your housing? No   Lack of transportation? No   Unable to get utilities (heat,electricity)? No            7/2/2024   Fall Risk   Fallen 2 or more times in the past year? No   Trouble with walking or balance? No             7/2/2024   Dental   Dentist two times every year? (!) NO            7/2/2024   TB Screening   Were you born outside of the [place]? No              Today's PHQ-2  Score:       2024     8:39 AM   PHQ-2 (  [employer])   Q1: Little interest or pleasure in doing things 3   Q2: Feeling down, depressed or hopeless 0   PHQ-2 Score 3   Q1: Little interest or pleasure in doing things Nearly every day   Q2: Feeling down, depressed or hopeless Not at all   PHQ-2 Score 3         2024   Substance Use   Alcohol more than 3/day or more than 7/wk No   Do you use any other substances recreationally? No        Social History     Tobacco Use    Smoking status: Never     Passive exposure: Never    Smokeless tobacco: Never   Vaping Use    Vaping status: Never Used   Substance Use Topics    Alcohol use: Yes     Comment: rarely    Drug use: No           2024   STI Screening   New sexual partner(s) since last STI/HIV test? No      ASCVD Risk   The 10-year ASCVD risk score (Silvia SANCHEZ, et al., 2019) is: 8.3%    Values used to calculate the score:      Age: 54 years      Sex: Male      Is Non- : No      Diabetic: Yes      Tobacco smoker: No      Systolic Blood Pressure: 138 mmHg      Is BP treated: Yes      HDL Cholesterol: 47 mg/dL      Total Cholesterol: 157 mg/dL    Fracture Risk Assessment Tool  Link to Frax Calculator  Use the information below to complete the Frax calculator  : 1970  Sex: male  Weight (kg): 151 kg (actual weight)  Height (cm): 188 cm  Previous Fragility Fracture:  No  History of parent with fractured hip:  No  Current Smoking:  No  Patient has been on glucocorticoids for more than 3 months (5mg/day or more): No  Rheumatoid Arthritis on Problem List:  No  Secondary Osteoporosis on Problem List:  No  Consumes 3 or more units of alcohol per day: No  Femoral Neck BMD (g/cm2)           Reviewed and updated as needed this visit by Provider                    Lab work is in process      Review of Systems  Constitutional, HEENT, cardiovascular, pulmonary, gi and gu systems are negative, except as otherwise noted.     Objective   "  Exam  /88   Pulse 71   Temp 97.3  F (36.3  C) (Tympanic)   Resp 16   Ht 1.88 m (6' 2\")   Wt (!) 151 kg (333 lb)   SpO2 95%   BMI 42.75 kg/m     Estimated body mass index is 42.75 kg/m  as calculated from the following:    Height as of this encounter: 1.88 m (6' 2\").    Weight as of this encounter: 151 kg (333 lb).    Physical Exam  GENERAL: alert and no distress  EYES: Eyes grossly normal to inspection, PERRL and conjunctivae and sclerae normal  HENT: ear canals and TM's normal, nose and mouth without ulcers or lesions  NECK: no adenopathy, no asymmetry, masses, or scars  RESP: lungs clear to auscultation - no rales, rhonchi or wheezes  CV: regular rate and rhythm, normal S1 S2, no S3 or S4, no murmur, click or rub, no peripheral edema  ABDOMEN: soft, nontender, no hepatosplenomegaly, no masses and bowel sounds normal  MS: no gross musculoskeletal defects noted, no edema  SKIN: no suspicious lesions or rashes  NEURO: Normal strength and tone, mentation intact and speech normal  PSYCH: mentation appears normal, affect normal/bright        Signed Electronically by: Ildefonso Lopez MD    "

## 2024-07-02 NOTE — PATIENT INSTRUCTIONS
"Patient Education   Preventive Care Advice   This is general advice we often give to help people stay healthy. Your care team may have specific advice just for you. Please talk to your care team about your own preventive care needs.  Lifestyle  Exercise at least 150 minutes each week (30 minutes a day, 5 days a week).  Do muscle strengthening activities 2 days a week. These help control your weight and prevent disease.  No smoking.  Wear sunscreen to prevent skin cancer.  Have your home tested for radon every 2 to 5 years. Radon is a colorless, odorless gas that can harm your lungs. To learn more, go to www.health.[facility].[place] and search for \"Radon in Homes.\"  Keep guns unloaded and locked up in a safe place like a safe or gun vault, or, use a gun lock and hide the keys. Always lock away bullets separately. To learn more, visit [employer].gov and search for \"safe gun storage.\"  Nutrition  Eat 5 or more servings of fruits and vegetables each day.  Try wheat bread, brown rice and whole grain pasta (instead of white bread, rice, and pasta).  Get enough calcium and vitamin D. Check the label on foods and aim for 100% of the RDA (recommended daily allowance).  Regular exams  Have a dental exam and cleaning every 6 months.  See your health care team every year to talk about:  Any changes in your health.  Any medicines your care team has prescribed.  Preventive care, family planning, and ways to prevent chronic diseases.  Shots (vaccines)   HPV shots (up to age 26), if you've never had them before.  Hepatitis B shots (up to age 59), if you've never had them before.  COVID-19 shot: Get this shot when it's due.  Flu shot: Get a flu shot every year.  Tetanus shot: Get a tetanus shot every 10 years.  Pneumococcal, hepatitis A, and RSV shots: Ask your care team if you need these based on your risk.  Shingles shot (for age 50 and up).  General health tests  Diabetes screening:  Starting at age 35, Get screened for diabetes at least " every 3 years.  If you are younger than age 35, ask your care team if you should be screened for diabetes.  Cholesterol test: At age 39, start having a cholesterol test every 5 years, or more often if advised.  Bone density scan (DEXA): At age 50, ask your care team if you should have this scan for osteoporosis (brittle bones).  Hepatitis C: Get tested at least once in your life.  Abdominal aortic aneurysm screening: Talk to your doctor about having this screening if you:  Have ever smoked; and  Are biologically male; and  Are between the ages of 65 and 75.  STIs (sexually transmitted infections)  Before age 24: Ask your care team if you should be screened for STIs.  After age 24: Get screened for STIs if you're at risk. You are at risk for STIs (including HIV) if:  You are sexually active with more than one person.  You don't use condoms every time.  You or a partner was diagnosed with a sexually transmitted infection.  If you are at risk for HIV, ask about PrEP medicine to prevent HIV.  Get tested for HIV at least once in your life, whether you are at risk for HIV or not.  Cancer screening tests  Cervical cancer screening: If you have a cervix, begin getting regular cervical cancer screening tests at age 21. Most people who have regular screenings with normal results can stop after age 65. Talk about this with your provider.  Breast cancer scan (mammogram): If you've ever had breasts, begin having regular mammograms starting at age 40. This is a scan to check for breast cancer.  Colon cancer screening: It is important to start screening for colon cancer at age 45.  Have a colonoscopy test every 10 years (or more often if you're at risk) Or, ask your provider about stool tests like a FIT test every year or Cologuard test every 3 years.  To learn more about your testing options, visit: www.[employer]271188.pdf.  For help making a decision, visit: filemon/lm60463.  Prostate cancer screening test: If you have a  prostate and are age 55 to 69, ask your provider if you would benefit from a yearly prostate cancer screening test.  Lung cancer screening: If you are a current or former smoker age 50 to 80, ask your care team if ongoing lung cancer screenings are right for you.  For informational purposes only. Not to replace the advice of your health care provider. Copyright   2023 [facility]. All rights reserved. Clinically reviewed by the [facility] Transitions Program. [employer] [id number] - REV 04/24.

## 2024-07-16 ENCOUNTER — HOSPITAL ENCOUNTER (OUTPATIENT)
Dept: GENERAL RADIOLOGY | Facility: HOSPITAL | Age: 54
Discharge: HOME | End: 2024-07-16
Attending: STUDENT IN AN ORGANIZED HEALTH CARE EDUCATION/TRAINING PROGRAM
Payer: COMMERCIAL

## 2024-07-16 ENCOUNTER — HOSPITAL ENCOUNTER (OUTPATIENT)
Facility: HOSPITAL | Age: 54
Discharge: HOME | End: 2024-07-16
Attending: STUDENT IN AN ORGANIZED HEALTH CARE EDUCATION/TRAINING PROGRAM | Admitting: STUDENT IN AN ORGANIZED HEALTH CARE EDUCATION/TRAINING PROGRAM
Payer: COMMERCIAL

## 2024-07-16 DIAGNOSIS — M54.42 CHRONIC BILATERAL LOW BACK PAIN WITH LEFT-SIDED SCIATICA: ICD-10-CM

## 2024-07-16 DIAGNOSIS — G89.29 CHRONIC BILATERAL LOW BACK PAIN WITH LEFT-SIDED SCIATICA: ICD-10-CM

## 2024-07-16 PROCEDURE — 250N000011 HC RX IP 250 OP 636: Performed by: RADIOLOGY

## 2024-07-16 PROCEDURE — 64483 NJX AA&/STRD TFRM EPI L/S 1: CPT | Mod: LT

## 2024-07-16 RX ORDER — IOPAMIDOL 612 MG/ML
15 INJECTION, SOLUTION INTRATHECAL ONCE
Status: COMPLETED | OUTPATIENT
Start: 2024-07-16 | End: 2024-07-16

## 2024-07-16 RX ORDER — DEXAMETHASONE SODIUM PHOSPHATE 10 MG/ML
10 INJECTION, SOLUTION INTRAMUSCULAR; INTRAVENOUS ONCE
Status: COMPLETED | OUTPATIENT
Start: 2024-07-16 | End: 2024-07-16

## 2024-07-16 RX ADMIN — IOPAMIDOL 2 ML: 612 INJECTION, SOLUTION INTRATHECAL at 10:54

## 2024-07-16 RX ADMIN — DEXAMETHASONE SODIUM PHOSPHATE 10 MG: 10 INJECTION INTRAMUSCULAR; INTRAVENOUS at 10:55

## 2024-07-16 ASSESSMENT — ACTIVITIES OF DAILY LIVING (ADL)
ADLS_ACUITY_SCORE: 35

## 2024-07-16 NOTE — DISCHARGE INSTRUCTIONS
Home number on file [phone] (home)  Is it ok to leave a message at this number(s)? Yes    Dr. Mancilla completed your procedure on 7/16/2024.    Current Pain Level (0-10 Scale): 2/10 LOW BACK;  0/10 LEFT LEG;  10/10 LEFT ANKLE  Post Pain Level (0-10):  0/10 NUMB    Radiology Discharge instructions for Steroid Injection    Activity Level:     Do not do any heavy activity or exercise for 24 hours.   Do not drive for 4 hours after your injection.  Diet:   Return to your normal diet.  Medications:   If you have stopped taking your Aspirin, Coumadin/Warfarin, Ibuprofen, or any   other blood thinner for this procedure you may resume in the morning unless   your primary care provider has given you other instructions.    Diabetics may see an increase in blood sugar after steroid injections. If you are concerned about your blood sugar, please contact your family doctor.    Site Care:  Remove the bandage and bathe or shower the morning after the procedure.      Please allow two weeks to experience improvement in your pain.  If you have any further issues, please contact your provider.    Call your Primary Care Provider if you have the following (if your primary care provider is not available please seek emergency care):   Nausea with vomiting   Severe headache   Drowsiness or confusion   Redness or drainage at the injection or puncture site   Temperature over 101 degrees F   Other concerns   Worsening back pain   Stiff neck

## 2024-07-16 NOTE — PROGRESS NOTES
Patient had an injection When patient stood he said his left leg felt numb. Will monitor and reassess. In wheel chair with nurses aid by side. Notified his wife as she is his . 1120 reassessed with assist of two and transfer belt. Patient marched in place and ambulated a short distance with a steady gait and no s/s of a numb leg . He said it didn't feel numb anymore. Nurse and Nurses Aid took patient to exit via w/ch. Wife to drive him home.

## 2024-07-17 ASSESSMENT — ACTIVITIES OF DAILY LIVING (ADL)
ADLS_ACUITY_SCORE: 35

## 2024-07-22 DIAGNOSIS — R79.89 ELEVATED LFTS: ICD-10-CM

## 2024-07-22 DIAGNOSIS — I10 BENIGN ESSENTIAL HYPERTENSION: ICD-10-CM

## 2024-07-22 DIAGNOSIS — E78.5 HYPERLIPIDEMIA LDL GOAL <100: ICD-10-CM

## 2024-07-22 DIAGNOSIS — J30.2 CHRONIC SEASONAL ALLERGIC RHINITIS: ICD-10-CM

## 2024-07-22 RX ORDER — AMLODIPINE BESYLATE 10 MG/1
10 TABLET ORAL DAILY
Qty: 90 TABLET | Refills: 2 | Status: SHIPPED | OUTPATIENT
Start: 2024-07-22

## 2024-07-22 RX ORDER — PRAVASTATIN SODIUM 40 MG
40 TABLET ORAL DAILY
Qty: 90 TABLET | Refills: 2 | Status: SHIPPED | OUTPATIENT
Start: 2024-07-22

## 2024-07-22 RX ORDER — CETIRIZINE HYDROCHLORIDE 10 MG/1
10 TABLET ORAL EVERY EVENING
Qty: 90 TABLET | Refills: 2 | Status: SHIPPED | OUTPATIENT
Start: 2024-07-22

## 2024-07-29 ENCOUNTER — TELEPHONE (OUTPATIENT)
Dept: INTERVENTIONAL RADIOLOGY/VASCULAR | Facility: HOSPITAL | Age: 54
End: 2024-07-29

## 2024-07-29 NOTE — TELEPHONE ENCOUNTER
INJECTION POST CALL    Was this an IR Referral? YES    Procedure: Epidural TF LT L5-S1  Radiologist(s): Dr. Roman Mancilla  Date of Procedure: 7/16/24    The patient had no questions or concerns.    Relief of pain from this injection    A = 90%  A- = 85%  B = 80%  B- =75%  C = 70%  C- = 65%  D = 60%  D- = 50%  F = less than 50%       Did you get any relief from this injection in the past 2 weeks? Yes, patient is getting about 90% of relief     If yes, how long did the relief last? Patient is still feeling this relief at the two week f/up    Are you still feeling relief? (2 weeks out) Yes  Would you say this injection has been beneficial? Yes      Was there one injection that worked better than the other? This is the first injection in the lumbar.    Where is the pain? Like once or twice a day patient says he feels pain in the lower back and the left ankle. And only last like 5 mins  Can you describe the pain? Achy dull  Does the pain radiate anywhere? No  If yes, where does it radiate and where does the pain stop? N/A    Is this new? No    Patient would not like to pursue another injection at this time.  Instruct patient to follow up with their provider for any further care they may need.    Franca Mcdowell

## 2024-08-07 DIAGNOSIS — G89.29 CHRONIC BILATERAL LOW BACK PAIN WITH LEFT-SIDED SCIATICA: ICD-10-CM

## 2024-08-07 DIAGNOSIS — M54.42 CHRONIC BILATERAL LOW BACK PAIN WITH LEFT-SIDED SCIATICA: ICD-10-CM

## 2024-08-07 RX ORDER — CYCLOBENZAPRINE HCL 10 MG
10 TABLET ORAL 3 TIMES DAILY PRN
Qty: 90 TABLET | Refills: 0 | Status: SHIPPED | OUTPATIENT
Start: 2024-08-07

## 2024-08-07 NOTE — TELEPHONE ENCOUNTER
Flexeril 10 mg       Last Written Prescription Date:  4/15/24  Last Fill Quantity: 90,   # refills: 0  Last Office Visit: 7/2/24  Future Office visit:       Routing refill request to provider for review/approval because:  Drug not on the [facility], [facility] or [facility] refill protocol or controlled substance

## 2024-09-20 DIAGNOSIS — E11.9 TYPE 2 DIABETES MELLITUS WITHOUT COMPLICATION, WITHOUT LONG-TERM CURRENT USE OF INSULIN (H): ICD-10-CM

## 2024-09-20 RX ORDER — SEMAGLUTIDE 2.68 MG/ML
INJECTION, SOLUTION SUBCUTANEOUS
Qty: 9 ML | Refills: 3 | Status: SHIPPED | OUTPATIENT
Start: 2024-09-20

## 2024-09-20 NOTE — TELEPHONE ENCOUNTER
Ozempic  Last Written Prescription Date: 9/27/23  Last Fill Quantity: 9 ml # of Refills: 3  Last Office Visit: 7/2/24

## 2024-10-15 NOTE — PROGRESS NOTES
Assessment & Plan     Primary osteoarthritis of left knee  Acute on chronic exacerbation of left knee pain without clear inciting event.  Known history of severe degenerative change with last MRI 2020 with complex meniscal disease; underwent arthroscopy with partial meniscectomy 3/30/2021.  Updated XR today with degenerative changes but no focal bone lesions or effusion.  Ligaments intact on exam, certainly could have component of recurrent meniscal disease.  Given history discussed repeat MRI, probable need for repeat operative management at some point, however would not be able to do any form of surgery for at least 4 months due to work.  Discussed risk/benefits of intra-articular steroid injection; opts to proceed today.  Injection tolerated without complication.  Did discuss recommendation for updating MRI if suboptimal results.  - XR Knee Left 3 Views (Clinic Performed); Future  - Large Joint/Bursa injection and/or drainage (Shoulder, Knee)  - triamcinolone (KENALOG-40) injection 40 mg  - lidocaine (PF) (XYLOCAINE) 1 % injection 4 mL    PROCEDURE: INJECTION.  After a discussion of risks, benefits and side effects of procedure, informed patient consent was obtained.  The left anterior knee was prepped and draped in the usual clean fashion (sterile not required for this procedure).  Vapocoolant spray was used for topical anesthesia  INJECTION:  Using 4 cc of 1 % lidocaine mixed with 40 mg of Kenalog, the left knee intra-articular space was successfully injected via lateral approach without complication.  Patient did experience some pain relief following injection.    Total time spent preparing to see the patient, review of chart, obtaining history and physical examination, review of treatment options, education, discussion with patient and documenting in Epic / EMR was 20 minutes. The total time does not include 8 minutes spent performing the separately reportable procedure. All time involved was spent on the  day of service for the patient (the same day as the patient's appointment).      The longitudinal plan of care for the diagnosis(es)/condition(s) as documented were addressed during this visit. Due to the added complexity in care, I will continue to support Juan in the subsequent management and with ongoing continuity of care.    Follow-up if symptoms do not improve.    Subjective   Juan is a 54 year old, presenting for the following health issues:  Knee Pain (Left)        10/16/2024     8:57 AM   Additional Questions   Roomed by Kelby Croft LPN   Accompanied by Self         10/16/2024     8:57 AM   Patient Reported Additional Medications   Patient reports taking the following new medications None     History of Present Illness       Reason for visit:  My left knee is bad   He is taking medications regularly.       Pain History:  When did you first notice your pain? Ongoing issue, had steroid shot about 4 years ago.  States has bone on bone in bilateral knees, but today only left is giving him some pain.   Have you seen this provider for your pain in the past? No   Where in your body do your have pain? Left Knee  Are you seeing anyone else for your pain? No  What makes your pain better? None  What makes your pain worse? Walking, sitting, moving  How has pain affected your ability to work? Working full time, but does slow him down.  Who lives in your household? Spouse and 3 children    -History of severe arthritis bilateral knees  -Has been getting by for some time but presents today with acute on chronic exacerbation of left knee pain  -No recent fall, injury, or inciting event  -Really struggling the last couple of weeks, bad with any weightbearing  -Not much for swelling, bruising, redness  -No fevers, chills, flulike symptoms  -No locking sensation  -Does feel like it gives out frequently  -Lots of cracking/popping sensations as well  -No hip or ankle issues  -Has been trying NSAIDs, supportive cares, nothing  "really helping  -Did have arthroscopic cleanout 2021  -Has not had steroid injections since that time  -Knows that he will likely need repeat surgery at some point, given his job would not be available for any procedural options for 3 to 4 months  -Would like to try steroid injection        10/7/2022    10:37 AM 1/25/2023     3:57 PM 4/2/2024     8:39 AM   PHQ-9 SCORE   PHQ-9 Total Score MyChart   0   PHQ-9 Total Score 0 0 0                 10/16/2024     9:04 AM   PEG Score   PEG Total Score 5.67     Review of Systems  Constitutional, HEENT, cardiovascular, pulmonary, gi and gu systems are negative, except as otherwise noted.      Objective    BP (!) 146/98   Pulse 81   Temp 97.2  F (36.2  C) (Tympanic)   Resp 16   Ht 1.88 m (6' 2\")   Wt 147.2 kg (324 lb 8 oz)   SpO2 97%   BMI 41.66 kg/m    Body mass index is 41.66 kg/m .  Physical Exam  Vitals reviewed.   Constitutional:       General: He is not in acute distress.     Appearance: Normal appearance. He is not toxic-appearing.   HENT:      Head: Normocephalic and atraumatic.   Musculoskeletal:      Right knee: Crepitus present. No swelling, deformity, effusion, erythema, ecchymosis or bony tenderness. Normal range of motion. No LCL laxity, MCL laxity, ACL laxity or PCL laxity.      Left knee: Crepitus present. No swelling, deformity, effusion, erythema, ecchymosis or bony tenderness. Normal range of motion. No LCL laxity, MCL laxity, ACL laxity or PCL laxity.  Skin:     General: Skin is warm and dry.   Neurological:      General: No focal deficit present.      Mental Status: He is alert and oriented to person, place, and time.   Psychiatric:         Mood and Affect: Mood normal.         Behavior: Behavior normal.          XR Knee Left 3 Views (Clinic Performed)    Result Date: 10/16/2024  PROCEDURE: XR KNEE LEFT 3 VIEWS 10/16/2024 9:57 AM HISTORY: Acute on chronc pain. Known OA; Primary osteoarthritis of left knee COMPARISONS: 2/7/2020. TECHNIQUE: 3 views. " FINDINGS: There is moderate degenerative change in the medial compartment with mild degenerative change in the lateral compartment. Mild to moderate degenerative changes seen in the patellofemoral joint. No fracture or dislocation is seen and there is no focal bone lesion. No significant joint effusion is seen.        IMPRESSION: Multilevel degenerative change. ERIC AVITIA MD   SYSTEM ID:  [id number]         Signed Electronically by: Ildefonso Lopez MD

## 2024-10-16 ENCOUNTER — OFFICE VISIT (OUTPATIENT)
Dept: FAMILY MEDICINE | Facility: OTHER | Age: 54
End: 2024-10-16
Attending: STUDENT IN AN ORGANIZED HEALTH CARE EDUCATION/TRAINING PROGRAM
Payer: COMMERCIAL

## 2024-10-16 ENCOUNTER — ANCILLARY PROCEDURE (OUTPATIENT)
Dept: GENERAL RADIOLOGY | Facility: OTHER | Age: 54
End: 2024-10-16
Attending: STUDENT IN AN ORGANIZED HEALTH CARE EDUCATION/TRAINING PROGRAM
Payer: COMMERCIAL

## 2024-10-16 VITALS
SYSTOLIC BLOOD PRESSURE: 146 MMHG | HEIGHT: 74 IN | WEIGHT: 315 LBS | HEART RATE: 81 BPM | BODY MASS INDEX: 40.43 KG/M2 | TEMPERATURE: 97.2 F | DIASTOLIC BLOOD PRESSURE: 98 MMHG | RESPIRATION RATE: 16 BRPM | OXYGEN SATURATION: 97 %

## 2024-10-16 DIAGNOSIS — M17.12 PRIMARY OSTEOARTHRITIS OF LEFT KNEE: Primary | ICD-10-CM

## 2024-10-16 DIAGNOSIS — M17.12 PRIMARY OSTEOARTHRITIS OF LEFT KNEE: ICD-10-CM

## 2024-10-16 PROCEDURE — 20610 DRAIN/INJ JOINT/BURSA W/O US: CPT | Mod: LT | Performed by: STUDENT IN AN ORGANIZED HEALTH CARE EDUCATION/TRAINING PROGRAM

## 2024-10-16 PROCEDURE — 99213 OFFICE O/P EST LOW 20 MIN: CPT | Mod: 25 | Performed by: STUDENT IN AN ORGANIZED HEALTH CARE EDUCATION/TRAINING PROGRAM

## 2024-10-16 PROCEDURE — 73562 X-RAY EXAM OF KNEE 3: CPT | Mod: TC | Performed by: RADIOLOGY

## 2024-10-16 RX ORDER — LIDOCAINE HYDROCHLORIDE 10 MG/ML
4 INJECTION, SOLUTION EPIDURAL; INFILTRATION; INTRACAUDAL; PERINEURAL ONCE
Status: COMPLETED | OUTPATIENT
Start: 2024-10-16 | End: 2024-10-16

## 2024-10-16 RX ORDER — TRIAMCINOLONE ACETONIDE 40 MG/ML
40 INJECTION, SUSPENSION INTRA-ARTICULAR; INTRAMUSCULAR ONCE
Status: COMPLETED | OUTPATIENT
Start: 2024-10-16 | End: 2024-10-16

## 2024-10-16 RX ADMIN — TRIAMCINOLONE ACETONIDE 40 MG: 40 INJECTION, SUSPENSION INTRA-ARTICULAR; INTRAMUSCULAR at 10:22

## 2024-10-16 RX ADMIN — LIDOCAINE HYDROCHLORIDE 4 ML: 10 INJECTION, SOLUTION EPIDURAL; INFILTRATION; INTRACAUDAL; PERINEURAL at 10:20

## 2024-10-16 ASSESSMENT — PAIN SCALES - GENERAL: PAINLEVEL: SEVERE PAIN (7)

## 2024-10-21 ENCOUNTER — TELEPHONE (OUTPATIENT)
Dept: FAMILY MEDICINE | Facility: OTHER | Age: 54
End: 2024-10-21

## 2024-10-21 DIAGNOSIS — M17.12 PRIMARY OSTEOARTHRITIS OF LEFT KNEE: Primary | ICD-10-CM

## 2024-10-21 NOTE — TELEPHONE ENCOUNTER
Left knee MRI order placed.  Next steps pending MRI results, anticipate orthopedics referral.  Thanks.

## 2024-10-21 NOTE — TELEPHONE ENCOUNTER
10:48 AM    Reason for Call: Phone Call    Description: Patient called to report that the Cortizone shot in his Left knee didn't work. He is requesting a call back from nurse to discuss next steps.    Was an appointment offered for this call? No  If yes : Appointment type              Date    Preferred method for responding to this message: Telephone Call  What is your phone number ?  [phone]     If we cannot reach you directly, may we leave a detailed response at the number you provided? Yes    Can this message wait until your PCP/provider returns, if available today? Not applicable    Janneth Rivas

## 2024-10-22 NOTE — TELEPHONE ENCOUNTER
Spoke with patient and is aware of next steps. He just wants to remind you that he does not want to see Dr. Au, otherwise he is okay with going to OA.

## 2024-10-31 ENCOUNTER — HOSPITAL ENCOUNTER (OUTPATIENT)
Dept: MRI IMAGING | Facility: HOSPITAL | Age: 54
Discharge: HOME | End: 2024-10-31
Attending: STUDENT IN AN ORGANIZED HEALTH CARE EDUCATION/TRAINING PROGRAM | Admitting: STUDENT IN AN ORGANIZED HEALTH CARE EDUCATION/TRAINING PROGRAM
Payer: COMMERCIAL

## 2024-10-31 DIAGNOSIS — M17.12 PRIMARY OSTEOARTHRITIS OF LEFT KNEE: ICD-10-CM

## 2024-10-31 PROCEDURE — 73721 MRI JNT OF LWR EXTRE W/O DYE: CPT | Mod: LT

## 2024-11-18 DIAGNOSIS — J30.2 CHRONIC SEASONAL ALLERGIC RHINITIS: ICD-10-CM

## 2024-11-18 RX ORDER — MONTELUKAST SODIUM 10 MG/1
TABLET ORAL
Qty: 90 TABLET | Refills: 3 | Status: SHIPPED | OUTPATIENT
Start: 2024-11-18

## 2024-11-20 ENCOUNTER — OFFICE VISIT (OUTPATIENT)
Dept: ORTHOPEDICS | Facility: OTHER | Age: 54
End: 2024-11-20
Attending: ORTHOPAEDIC SURGERY
Payer: COMMERCIAL

## 2024-11-20 VITALS
SYSTOLIC BLOOD PRESSURE: 128 MMHG | OXYGEN SATURATION: 97 % | RESPIRATION RATE: 20 BRPM | HEART RATE: 87 BPM | DIASTOLIC BLOOD PRESSURE: 82 MMHG

## 2024-11-20 DIAGNOSIS — M17.12 PRIMARY OSTEOARTHRITIS OF LEFT KNEE: ICD-10-CM

## 2024-11-20 PROCEDURE — 99213 OFFICE O/P EST LOW 20 MIN: CPT | Performed by: ORTHOPAEDIC SURGERY

## 2024-11-20 NOTE — PATIENT INSTRUCTIONS
Thank you for allowing Dr. Aden Villa and his team to participate in your care. Please call our health unit coordinator at [phone] with scheduling questions or the nurse at [phone] with any other questions or concerns.        You may call the Orthopedic nurse at [phone] with any questions.

## 2024-11-25 NOTE — PROGRESS NOTES
ORTHOPEDIC CLINIC CONSULT    Referred by:     Chief Complaint: Jason Duncan is a 54 year old male who is being seen for   Chief Complaint   Patient presents with    Arthritis     Left knee- injection 10/16- effective       History of Present Illness:   Patient is a 54-year-old male presents for left knee osteoarthritis.  Been having on and off issues for at least 4 to 5 years.  Has been progressively getting worse over that time.  Last 3 months have been about the worst.  He did have an injection in October and the primary care clinic and is started to kick in.  Pain wise he is overall doing much better at this time as the injection now started to kick in.  Prior to that he was still struggling with significant amount    Patient's past medical, surgical, social and family histories reviewed.     Past Medical History:   Diagnosis Date    Allergic rhinitis     Benign essential hypertension 6/9/2008     Problem list name updated by automated process. Provider to review    Chronic gout of multiple sites 9/16/2019    Cramp of limb 4/17/2018    Encounter for monitoring statin therapy 6/28/2016    Advance Care Planning 6/28/2016: ACP Review of Chart / Resources Provided:  Reviewed chart for advance care plan.  Jason Duncan has no plan or code status on file. Discussed available resources and provided with information. Confirmed code status reflects current choices pending further ACP discussions.  Confirmed/documented legally designated decision makers.  Added by Phyllis Olvera       Hyperlipidemia LDL goal <100 9/16/2019    RACHELE (obstructive sleep apnea)     Primary insomnia 9/16/2019    Primary osteoarthritis of left knee 2/21/2020    Type 2 diabetes mellitus without complication, without long-term current use of insulin (H) 7/10/2018    Vitamin D deficiency 4/17/2018       Past Surgical History:   Procedure Laterality Date    ARTHROSCOPY KNEE Left 3/30/2021    Procedure: LEFT KNEE ARTHROSCOPY:PARTIAL MEDIAL  MENISECTOMY, PARTIAL LATERAL MENISECTOMY;  Surgeon: Donny Au MD;  Location: [facility] OR    ARTHROSCOPY KNEE Right 2/3/2023    Procedure: Right Knee Arthroscopy, Partial Medial Menisectomy, Extensive Debridement, Loose Body Removal, Chondroplasty;  Surgeon: Avinash Hampton MD;  Location: [facility] OR    GASTRIC RESTRICTIVE PROCEDURE, OPEN, REMOVE/REPLACE SUBCUTANEOUS PORT  2008    GI SURGERY  2015    removal of lap band     lap band  2008    nasal septoplasty      SEPTOPLASTY      Nasal       Home Medications:  Prior to Admission medications    Medication Sig Start Date End Date Taking? Authorizing Provider   amLODIPine (NORVASC) 10 MG tablet TAKE 1 TABLET BY MOUTH DAILY 7/22/24  Yes Ildefonso Lopez MD   aspirin 81 MG tablet Take by mouth daily   Yes Reported, Patient   cetirizine (ZYRTEC) 10 MG tablet TAKE 1 TABLET BY MOUTH EVERY EVENING 7/22/24  Yes Ildefonso Lopez MD   cyclobenzaprine (FLEXERIL) 10 MG tablet TAKE 1 TABLET BY MOUTH 3 TIMES DAILY AS NEEDED FOR MUSCLE SPASMS 8/7/24  Yes Ildefonso Lopez MD   ibuprofen (ADVIL/MOTRIN) 800 MG tablet Take 1 tablet (800 mg) by mouth 2 times daily as needed for moderate pain 9/27/23  Yes Ildefonso Lopez MD   indomethacin (INDOCIN) 25 MG capsule Take 1 capsule (25 mg) by mouth 3 times daily as needed for moderate pain (4-6) 2/27/23  Yes Ildefonso Lopez MD   lisinopril (ZESTRIL) 40 MG tablet Take 1 tablet (40 mg) by mouth daily 12/28/23  Yes Ildefonso Lopez MD   loratadine (CLARITIN) 10 MG tablet TAKE 1 TABLET BY MOUTH DAILY 1/8/24  Yes Ildefonso Lopez MD   metFORMIN (GLUCOPHAGE XR) 500 MG 24 hr tablet TAKE 2 TABLETS BY MOUTH 2 TIMES DAILY WITH MEALS 6/11/24  Yes Ildefonso Lopez MD   montelukast (SINGULAIR) 10 MG tablet TAKE 1 TABLET BY MOUTH DAILY AT BEDTIME 11/18/24  Yes Ildefonso Lopez MD   multivitamin w/minerals (THERA-VIT-M) tablet Take 1 tablet by mouth daily   Yes Reported, Patient   order for DME c-pap mask and supplies    DX: G47.33, sleep apnea      RACHELE - uses  CPAP  Stable, does well with CPAP  Is due for annual supply renewal  Length of need:  Lifetime  Face to face visit - 2/21/2020 2/21/20  Yes Samira Whitney, CNP   pravastatin (PRAVACHOL) 40 MG tablet TAKE 1 TABLET BY MOUTH DAILY 7/22/24  Yes Ildefonso Lopez MD   Semaglutide, 2 MG/DOSE, (OZEMPIC, 2 MG/DOSE,) 8 MG/3ML pen INJECT 2MG SUBCUTANEOUSLY EVERY 7 DAYS 9/20/24  Yes Ildefonso Lopez MD   sildenafil (VIAGRA) 100 MG tablet Take 1 tablet (100 mg) by mouth daily as needed (Take 30-60 minutes before intercourse.) 6/12/24  Yes Ildefonso Lopez MD   VITAMIN D, CHOLECALCIFEROL, PO Take 5,000 Units by mouth daily    Yes Reported, Patient       No Known Allergies    Social History     Occupational History    Not on file   Tobacco Use    Smoking status: Never     Passive exposure: Never    Smokeless tobacco: Never   Vaping Use    Vaping status: Never Used   Substance and Sexual Activity    Alcohol use: Yes     Comment: rarely    Drug use: No    Sexual activity: Not on file       Family History   Problem Relation Age of Onset    Dementia Mother 72        Cause of death    Diabetes Father     Respiratory Father         COPD    Cardiovascular Father         CHF       REVIEW OF SYSTEMS            Physical Exam:    Vitals: /82   Pulse 87   Resp 20   SpO2 97%   BMI= There is no height or weight on file to calculate BMI.  On examination is awake alert and oriented cooperative no apparent distress.  Has genu varum on examination stable varus valgus stress anterior drawer and Lachman.  Comes to near full extension and flexion beyond 125 flexion  Radiographs: Recent MRI examination was of tricompartment osteoarthritic changes as well as medial meniscus tear complex as well as some lateral interim fraying free edge tearing 2.     Independent visualization of the films was made.         Impression:      ICD-10-CM    1. Primary osteoarthritis of left knee  M17.12 Orthopedic  Referral          Plan: Pression plan left knee  osteoarthritis and genu varum and tricompartment disease as well as meniscal lesions.  His injections is now starting to kick in as otherwise doing well.  At this time I am would continue with the contract current to more conservative measures for his pain and injections doing well.  Have a follow-up in symptoms recur and then discuss again options depending on how long the injection lasts as well as how much it continues to improve and whether it is worth continuing injection therapy or any viscosupplementation or more aggressive options.    All of the above pertinent physical exam and imaging modalities findings was reviewed with Jason.    Return to clinic in symptoms recur weeks.    Further imaging required none at this time    Time spent with evaluation: 30 minutes    Aden Villa MD  11/24/2024  9:05 PM

## 2024-12-03 DIAGNOSIS — G89.29 CHRONIC BILATERAL LOW BACK PAIN WITH LEFT-SIDED SCIATICA: ICD-10-CM

## 2024-12-03 DIAGNOSIS — M54.42 CHRONIC BILATERAL LOW BACK PAIN WITH LEFT-SIDED SCIATICA: ICD-10-CM

## 2024-12-03 NOTE — TELEPHONE ENCOUNTER
cyclobenzaprine (FLEXERIL) 10 MG tablet         Last Written Prescription Date:  8/7/24  Last Fill Quantity: 90,   # refills: 0  Last Office Visit: 10/16/24  Future Office visit:    Next 5 appointments (look out 90 days)      Jan 03, 2025 8:30 AM  (Arrive by 8:15 AM)  Provider Visit with Ildefonso Lopez MD  [facility] [place] ([facility] [place] ) [address]  [place] [address]  [phone]             Routing refill request to provider for review/approval because:  Drug not on the [facility], [facility] or  Health refill protocol or controlled substance

## 2024-12-04 RX ORDER — CYCLOBENZAPRINE HCL 10 MG
10 TABLET ORAL 3 TIMES DAILY PRN
Qty: 90 TABLET | Refills: 0 | Status: SHIPPED | OUTPATIENT
Start: 2024-12-04

## 2025-01-03 ENCOUNTER — APPOINTMENT (OUTPATIENT)
Dept: LAB | Facility: OTHER | Age: 55
End: 2025-01-03
Payer: COMMERCIAL

## 2025-01-29 ENCOUNTER — TELEPHONE (OUTPATIENT)
Dept: FAMILY MEDICINE | Facility: OTHER | Age: 55
End: 2025-01-29

## 2025-01-29 NOTE — TELEPHONE ENCOUNTER
Received PA DENIAL from [employer] for ZEPBOUND 5 MG/0.5ML prefilled pen. Submitted on CMM, response DENIED - Med not covered by plan.  There is no pathway to approval.

## 2025-01-31 ENCOUNTER — MYC MEDICAL ADVICE (OUTPATIENT)
Dept: FAMILY MEDICINE | Facility: OTHER | Age: 55
End: 2025-01-31

## 2025-01-31 DIAGNOSIS — E66.813 CLASS 3 SEVERE OBESITY DUE TO EXCESS CALORIES WITHOUT SERIOUS COMORBIDITY IN ADULT, UNSPECIFIED BMI (H): ICD-10-CM

## 2025-01-31 DIAGNOSIS — E78.5 HYPERLIPIDEMIA LDL GOAL <100: ICD-10-CM

## 2025-01-31 DIAGNOSIS — E66.01 CLASS 3 SEVERE OBESITY DUE TO EXCESS CALORIES WITHOUT SERIOUS COMORBIDITY IN ADULT, UNSPECIFIED BMI (H): ICD-10-CM

## 2025-01-31 DIAGNOSIS — E11.9 TYPE 2 DIABETES MELLITUS WITHOUT COMPLICATION, WITHOUT LONG-TERM CURRENT USE OF INSULIN (H): Primary | ICD-10-CM

## 2025-02-04 NOTE — TELEPHONE ENCOUNTER
I spoke with patient about mychart message. I have pended Mounjaro and discontinued Ozempic due to insurance no longer covering.

## 2025-02-27 ENCOUNTER — TELEPHONE (OUTPATIENT)
Dept: FAMILY MEDICINE | Facility: OTHER | Age: 55
End: 2025-02-27

## 2025-02-27 NOTE — TELEPHONE ENCOUNTER
2:03 PM    Reason for Call: OVERBOOK    Patient is having the following symptoms: L achilles pain for 4 days. No injury.     The patient is requesting an appointment for ASAP with Dr. Lopez.    Was an appointment offered for this call? Yes I offered first appointment (April). Patient hoping to get checked out sooner than that     Preferred method for responding to this message: Telephone Call  What is your phone number ?[phone]     If we cannot reach you directly, may we leave a detailed response at the number you provided? Yes    Can this message wait until your PCP/provider returns, if unavailable today? Yes    Pallavi Kerr

## 2025-03-04 NOTE — PROGRESS NOTES
Assessment & Plan     Left Achilles tendinitis  1 week of uncomplicated left Achilles tendinitis, probably exacerbated with walking uneven ground, also left knee issues.  Achilles tendon intact, no palpable calcaneal spurring on exam.  Discussed a number of supportive cares as below.  - Home strengthening/stretching regimen from patient advisor handbook provided  - Naproxen 500 mg twice daily for 7 days and then as needed  - Limiting overuse activity  - Discussed heel cups versus midfoot inserts for elevation/offloading support  - Consider PT if persists  - Follow-up if symptoms persist/worsen    I spent a total of 24 minutes on the day of the visit.   Time spent by me today doing chart review, history and exam, documentation and further activities per the note    The longitudinal plan of care for the diagnosis(es)/condition(s) as documented were addressed during this visit. Due to the added complexity in care, I will continue to support Jason Duncan in the subsequent management and with ongoing continuity of care.    Follow-up follow-up as needed.    Kyleigh Martinez is a 55 year old, presenting for the following health issues:  achilles pain         1/3/2025     8:17 AM   Additional Questions   Roomed by iMchaelle White   Accompanied by self     History of Present Illness       Reason for visit:  My left achilles  Symptom onset:  3-7 days ago  Symptoms include:  It hurts  Symptom intensity:  Moderate  Symptom progression:  Staying the same  Had these symptoms before:  No  What makes it worse:  No  What makes it better:  No   He is taking medications regularly.        Pain History:  When did you first notice your pain? Monday March 3rd    Have you seen anyone else for your pain? No  How has your pain affected your ability to work? 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 Musculoskeletal  "problem/pain  Onset/Duration: left achilles pain   Description  Location: foot - left  Joint Swelling: No  Redness: No  Pain: YES  Warmth: No  Intensity:  4/10  Progression of Symptoms:  same  Accompanying signs and symptoms:   Fevers: No  Numbness/tingling/weakness: No  History  Trauma to the area: No  Recent illness:  No  Previous similar problem: No  Previous evaluation:  No  Precipitating or alleviating factors:  Aggravating factors include: pain is constant pain is always the same   Therapies tried and outcome: nothing    -Insidious onset  -No specific falls, missteps, stepping in holes  -A lot of walking on uneven surfaces, climbing snow mirza for his job  -Wears hard work boots, standard insoles that came with the boot  -Deals with chronic left knee issues; knee is actually doing pretty well lately after cortisone shot but feels it is related to weakness surrounding the knee  -No popping sensation, no calf pain or bulging calf  -No heel pain or foot pain  -Occasionally does ibuprofen  -Symptoms tolerable but annoying achiness  -Not really changing lately  -No swelling or bruising    Review of Systems  Constitutional, HEENT, cardiovascular, pulmonary, gi and gu systems are negative, except as otherwise noted.      Objective    /78 (BP Location: Left arm, Patient Position: Sitting, Cuff Size: Adult Large)   Pulse 91   Temp 97.8  F (36.6  C) (Tympanic)   Ht 1.88 m (6' 2\")   Wt (!) 150.2 kg (331 lb 3.2 oz)   SpO2 96%   BMI 42.52 kg/m    Body mass index is 42.52 kg/m .  Physical Exam  Vitals reviewed.   Constitutional:       General: He is not in acute distress.     Appearance: Normal appearance. He is not toxic-appearing.   HENT:      Head: Normocephalic and atraumatic.   Musculoskeletal:      Comments: Left lower extremity; no deformity to inspection, tenderness to palpation of Achilles tendon proximal to calcaneal insertion.  No calcaneal insertion pain, heel squeeze negative.  No midfoot pain.  No " pain with calf squeeze.  Weightbearing/ambulating without difficulty.   Skin:     General: Skin is warm and dry.   Neurological:      General: No focal deficit present.      Mental Status: He is alert and oriented to person, place, and time.   Psychiatric:         Mood and Affect: Mood normal.         Behavior: Behavior normal.        Signed Electronically by: Ildefonso Lopez MD

## 2025-03-05 ENCOUNTER — OFFICE VISIT (OUTPATIENT)
Dept: FAMILY MEDICINE | Facility: OTHER | Age: 55
End: 2025-03-05
Attending: STUDENT IN AN ORGANIZED HEALTH CARE EDUCATION/TRAINING PROGRAM
Payer: COMMERCIAL

## 2025-03-05 VITALS
WEIGHT: 315 LBS | DIASTOLIC BLOOD PRESSURE: 78 MMHG | SYSTOLIC BLOOD PRESSURE: 122 MMHG | TEMPERATURE: 97.8 F | OXYGEN SATURATION: 96 % | HEIGHT: 74 IN | BODY MASS INDEX: 40.43 KG/M2 | HEART RATE: 91 BPM

## 2025-03-05 DIAGNOSIS — M76.62 LEFT ACHILLES TENDINITIS: Primary | ICD-10-CM

## 2025-03-05 ASSESSMENT — PAIN SCALES - GENERAL: PAINLEVEL_OUTOF10: MODERATE PAIN (4)

## 2025-03-24 DIAGNOSIS — E66.01 CLASS 3 SEVERE OBESITY DUE TO EXCESS CALORIES WITHOUT SERIOUS COMORBIDITY IN ADULT, UNSPECIFIED BMI (H): ICD-10-CM

## 2025-03-24 DIAGNOSIS — E11.9 TYPE 2 DIABETES MELLITUS WITHOUT COMPLICATION, WITHOUT LONG-TERM CURRENT USE OF INSULIN (H): ICD-10-CM

## 2025-03-24 DIAGNOSIS — E66.813 CLASS 3 SEVERE OBESITY DUE TO EXCESS CALORIES WITHOUT SERIOUS COMORBIDITY IN ADULT, UNSPECIFIED BMI (H): ICD-10-CM

## 2025-03-24 RX ORDER — TIRZEPATIDE 5 MG/.5ML
INJECTION, SOLUTION SUBCUTANEOUS
Qty: 2 ML | Refills: 0 | Status: SHIPPED | OUTPATIENT
Start: 2025-03-24

## 2025-03-24 NOTE — TELEPHONE ENCOUNTER
Mounjaro      Last Written Prescription Date:  Not previously prescribed  Last Fill Quantity: -,   # refills: -  Last Office Visit: 3/5/25  Future Office visit:       Routing refill request to provider for review/approval because:  Drug not active on patient's medication list  Medication is reported/historical

## 2025-03-25 DIAGNOSIS — E11.9 TYPE 2 DIABETES MELLITUS WITHOUT COMPLICATION (H): Primary | ICD-10-CM

## 2025-03-25 DIAGNOSIS — E66.01 CLASS 3 SEVERE OBESITY DUE TO EXCESS CALORIES IN ADULT (H): ICD-10-CM

## 2025-03-25 DIAGNOSIS — E66.813 CLASS 3 SEVERE OBESITY DUE TO EXCESS CALORIES IN ADULT (H): ICD-10-CM

## 2025-03-25 NOTE — TELEPHONE ENCOUNTER
Patient calling the stated his insurance company [facility] needs Dr Lopez to send a pre authorization to the [facility] for Mounjaro next dose he was taking 5mg and he isn't sure what the new dose will be.     Phone: [phone]

## 2025-04-01 DIAGNOSIS — E11.9 TYPE 2 DIABETES MELLITUS WITHOUT COMPLICATION, WITHOUT LONG-TERM CURRENT USE OF INSULIN (H): ICD-10-CM

## 2025-04-01 RX ORDER — METFORMIN HYDROCHLORIDE 500 MG/1
TABLET, EXTENDED RELEASE ORAL
Qty: 360 TABLET | Refills: 0 | Status: SHIPPED | OUTPATIENT
Start: 2025-04-01

## 2025-06-04 DIAGNOSIS — I10 BENIGN ESSENTIAL HYPERTENSION: ICD-10-CM

## 2025-06-04 RX ORDER — AMLODIPINE BESYLATE 10 MG/1
10 TABLET ORAL DAILY
Qty: 90 TABLET | Refills: 2 | Status: SHIPPED | OUTPATIENT
Start: 2025-06-04

## 2025-07-06 SDOH — HEALTH STABILITY: PHYSICAL HEALTH: ON AVERAGE, HOW MANY MINUTES DO YOU ENGAGE IN EXERCISE AT THIS LEVEL?: 150+ MIN

## 2025-07-06 SDOH — HEALTH STABILITY: PHYSICAL HEALTH: ON AVERAGE, HOW MANY DAYS PER WEEK DO YOU ENGAGE IN MODERATE TO STRENUOUS EXERCISE (LIKE A BRISK WALK)?: 5 DAYS

## 2025-07-06 ASSESSMENT — SOCIAL DETERMINANTS OF HEALTH (SDOH): HOW OFTEN DO YOU GET TOGETHER WITH FRIENDS OR RELATIVES?: PATIENT DECLINED

## 2025-07-09 ENCOUNTER — ORDERS ONLY (AUTO-RELEASED) (OUTPATIENT)
Dept: FAMILY MEDICINE | Facility: OTHER | Age: 55
End: 2025-07-09

## 2025-07-09 ENCOUNTER — LAB (OUTPATIENT)
Dept: LAB | Facility: OTHER | Age: 55
End: 2025-07-09
Payer: COMMERCIAL

## 2025-07-09 ENCOUNTER — OFFICE VISIT (OUTPATIENT)
Dept: FAMILY MEDICINE | Facility: OTHER | Age: 55
End: 2025-07-09
Attending: STUDENT IN AN ORGANIZED HEALTH CARE EDUCATION/TRAINING PROGRAM
Payer: COMMERCIAL

## 2025-07-09 VITALS
BODY MASS INDEX: 39.17 KG/M2 | OXYGEN SATURATION: 95 % | TEMPERATURE: 98.2 F | HEART RATE: 86 BPM | RESPIRATION RATE: 20 BRPM | DIASTOLIC BLOOD PRESSURE: 82 MMHG | WEIGHT: 315 LBS | SYSTOLIC BLOOD PRESSURE: 130 MMHG | HEIGHT: 75 IN

## 2025-07-09 DIAGNOSIS — Z12.11 SCREENING FOR MALIGNANT NEOPLASM OF COLON: ICD-10-CM

## 2025-07-09 DIAGNOSIS — K76.0 NAFL (NONALCOHOLIC FATTY LIVER): ICD-10-CM

## 2025-07-09 DIAGNOSIS — E11.9 TYPE 2 DIABETES MELLITUS WITHOUT COMPLICATION, WITHOUT LONG-TERM CURRENT USE OF INSULIN (H): ICD-10-CM

## 2025-07-09 DIAGNOSIS — I10 BENIGN ESSENTIAL HYPERTENSION: ICD-10-CM

## 2025-07-09 DIAGNOSIS — Z23 NEED FOR PNEUMOCOCCAL VACCINE: ICD-10-CM

## 2025-07-09 DIAGNOSIS — M10.00 IDIOPATHIC GOUT, UNSPECIFIED CHRONICITY, UNSPECIFIED SITE: ICD-10-CM

## 2025-07-09 DIAGNOSIS — Z00.00 ROUTINE GENERAL MEDICAL EXAMINATION AT A HEALTH CARE FACILITY: ICD-10-CM

## 2025-07-09 DIAGNOSIS — G89.29 CHRONIC BILATERAL LOW BACK PAIN WITH LEFT-SIDED SCIATICA: ICD-10-CM

## 2025-07-09 DIAGNOSIS — E66.01 MORBID OBESITY DUE TO EXCESS CALORIES (H): ICD-10-CM

## 2025-07-09 DIAGNOSIS — Z12.5 SCREENING FOR MALIGNANT NEOPLASM OF PROSTATE: ICD-10-CM

## 2025-07-09 DIAGNOSIS — E78.5 HYPERLIPIDEMIA LDL GOAL <100: ICD-10-CM

## 2025-07-09 DIAGNOSIS — Z00.00 ROUTINE GENERAL MEDICAL EXAMINATION AT A HEALTH CARE FACILITY: Primary | ICD-10-CM

## 2025-07-09 DIAGNOSIS — M54.42 CHRONIC BILATERAL LOW BACK PAIN WITH LEFT-SIDED SCIATICA: ICD-10-CM

## 2025-07-09 DIAGNOSIS — Z00.00 HEALTHCARE MAINTENANCE: ICD-10-CM

## 2025-07-09 DIAGNOSIS — L98.9 SKIN LESION: ICD-10-CM

## 2025-07-09 LAB
ALBUMIN SERPL BCG-MCNC: 4.3 G/DL (ref 3.5–5.2)
ALP SERPL-CCNC: 64 U/L (ref 40–150)
ALT SERPL W P-5'-P-CCNC: 81 U/L (ref 0–70)
ANION GAP SERPL CALCULATED.3IONS-SCNC: 14 MMOL/L (ref 7–15)
AST SERPL W P-5'-P-CCNC: 55 U/L (ref 0–45)
BASOPHILS # BLD AUTO: 0.1 10E3/UL (ref 0–0.2)
BASOPHILS NFR BLD AUTO: 1 %
BILIRUB SERPL-MCNC: 0.6 MG/DL
BUN SERPL-MCNC: 13.1 MG/DL (ref 6–20)
CALCIUM SERPL-MCNC: 9.7 MG/DL (ref 8.8–10.4)
CHLORIDE SERPL-SCNC: 102 MMOL/L (ref 98–107)
CHOLEST SERPL-MCNC: 132 MG/DL
CREAT SERPL-MCNC: 1.03 MG/DL (ref 0.67–1.17)
CREAT UR-MCNC: 112.7 MG/DL
EGFRCR SERPLBLD CKD-EPI 2021: 87 ML/MIN/1.73M2
EOSINOPHIL # BLD AUTO: 0.2 10E3/UL (ref 0–0.7)
EOSINOPHIL NFR BLD AUTO: 3 %
ERYTHROCYTE [DISTWIDTH] IN BLOOD BY AUTOMATED COUNT: 12.7 % (ref 10–15)
EST. AVERAGE GLUCOSE BLD GHB EST-MCNC: 131 MG/DL
FASTING STATUS PATIENT QL REPORTED: YES
FASTING STATUS PATIENT QL REPORTED: YES
GLUCOSE SERPL-MCNC: 109 MG/DL (ref 70–99)
HBA1C MFR BLD: 6.2 %
HCO3 SERPL-SCNC: 21 MMOL/L (ref 22–29)
HCT VFR BLD AUTO: 45.4 % (ref 40–53)
HDLC SERPL-MCNC: 40 MG/DL
HGB BLD-MCNC: 15.9 G/DL (ref 13.3–17.7)
IMM GRANULOCYTES # BLD: 0 10E3/UL
IMM GRANULOCYTES NFR BLD: 0 %
LDLC SERPL CALC-MCNC: 65 MG/DL
LYMPHOCYTES # BLD AUTO: 1.4 10E3/UL (ref 0.8–5.3)
LYMPHOCYTES NFR BLD AUTO: 19 %
MCH RBC QN AUTO: 30.3 PG (ref 26.5–33)
MCHC RBC AUTO-ENTMCNC: 35 G/DL (ref 31.5–36.5)
MCV RBC AUTO: 87 FL (ref 78–100)
MICROALBUMIN UR-MCNC: 50.4 MG/L
MICROALBUMIN/CREAT UR: 44.72 MG/G CR (ref 0–17)
MONOCYTES # BLD AUTO: 0.7 10E3/UL (ref 0–1.3)
MONOCYTES NFR BLD AUTO: 9 %
NEUTROPHILS # BLD AUTO: 4.9 10E3/UL (ref 1.6–8.3)
NEUTROPHILS NFR BLD AUTO: 67 %
NONHDLC SERPL-MCNC: 92 MG/DL
NRBC # BLD AUTO: 0 10E3/UL
NRBC BLD AUTO-RTO: 0 /100
PLATELET # BLD AUTO: 295 10E3/UL (ref 150–450)
POTASSIUM SERPL-SCNC: 4.3 MMOL/L (ref 3.4–5.3)
PROT SERPL-MCNC: 7.1 G/DL (ref 6.4–8.3)
PSA SERPL DL<=0.01 NG/ML-MCNC: 0.46 NG/ML (ref 0–3.5)
RBC # BLD AUTO: 5.24 10E6/UL (ref 4.4–5.9)
SODIUM SERPL-SCNC: 137 MMOL/L (ref 135–145)
TRIGL SERPL-MCNC: 136 MG/DL
TSH SERPL DL<=0.005 MIU/L-ACNC: 1.45 UIU/ML (ref 0.3–4.2)
URATE SERPL-MCNC: 7.9 MG/DL (ref 3.4–7)
VIT D+METAB SERPL-MCNC: 64 NG/ML (ref 20–50)
WBC # BLD AUTO: 7.2 10E3/UL (ref 4–11)

## 2025-07-09 PROCEDURE — 3048F LDL-C <100 MG/DL: CPT

## 2025-07-09 PROCEDURE — 36415 COLL VENOUS BLD VENIPUNCTURE: CPT

## 2025-07-09 PROCEDURE — 80050 GENERAL HEALTH PANEL: CPT

## 2025-07-09 PROCEDURE — 82043 UR ALBUMIN QUANTITATIVE: CPT

## 2025-07-09 PROCEDURE — 88304 TISSUE EXAM BY PATHOLOGIST: CPT | Mod: 26 | Performed by: PATHOLOGY

## 2025-07-09 PROCEDURE — G0103 PSA SCREENING: HCPCS

## 2025-07-09 PROCEDURE — 3044F HG A1C LEVEL LT 7.0%: CPT

## 2025-07-09 PROCEDURE — 82306 VITAMIN D 25 HYDROXY: CPT

## 2025-07-09 PROCEDURE — 80061 LIPID PANEL: CPT

## 2025-07-09 PROCEDURE — 82570 ASSAY OF URINE CREATININE: CPT

## 2025-07-09 PROCEDURE — 83036 HEMOGLOBIN GLYCOSYLATED A1C: CPT

## 2025-07-09 PROCEDURE — 88304 TISSUE EXAM BY PATHOLOGIST: CPT | Mod: TC | Performed by: STUDENT IN AN ORGANIZED HEALTH CARE EDUCATION/TRAINING PROGRAM

## 2025-07-09 PROCEDURE — 84550 ASSAY OF BLOOD/URIC ACID: CPT

## 2025-07-09 RX ORDER — LIDOCAINE HYDROCHLORIDE AND EPINEPHRINE 10; 10 MG/ML; UG/ML
5 INJECTION, SOLUTION INFILTRATION; PERINEURAL ONCE
Status: COMPLETED | OUTPATIENT
Start: 2025-07-09 | End: 2025-07-09

## 2025-07-09 RX ORDER — CYCLOBENZAPRINE HCL 10 MG
10 TABLET ORAL 3 TIMES DAILY PRN
Qty: 90 TABLET | Refills: 0 | Status: SHIPPED | OUTPATIENT
Start: 2025-07-09

## 2025-07-09 RX ADMIN — LIDOCAINE HYDROCHLORIDE AND EPINEPHRINE 1 ML: 10; 10 INJECTION, SOLUTION INFILTRATION; PERINEURAL at 09:29

## 2025-07-09 ASSESSMENT — PAIN SCALES - GENERAL: PAINLEVEL_OUTOF10: NO PAIN (0)

## 2025-07-09 NOTE — PATIENT INSTRUCTIONS
Patient Education   Preventive Care Advice   This is general advice given by our system to help you stay healthy. However, your care team may have specific advice just for you. Please talk to your care team about your preventive care needs.  Nutrition  Eat 5 or more servings of fruits and vegetables each day.  Try wheat bread, brown rice and whole grain pasta (instead of white bread, rice, and pasta).  Get enough calcium and vitamin D. Check the label on foods and aim for 100% of the RDA (recommended daily allowance).  Lifestyle  Exercise at least 150 minutes each week  (30 minutes a day, 5 days a week).  Do muscle strengthening activities 2 days a week. These help control your weight and prevent disease.  No smoking.  Wear sunscreen to prevent skin cancer.  Have a dental exam and cleaning every 6 months.  Yearly exams  See your health care team every year to talk about:  Any changes in your health.  Any medicines your care team has prescribed.  Preventive care, family planning, and ways to prevent chronic diseases.  Shots (vaccines)   HPV shots (up to age 26), if you've never had them before.  Hepatitis B shots (up to age 59), if you've never had them before.  COVID-19 shot: Get this shot when it's due.  Flu shot: Get a flu shot every year.  Tetanus shot: Get a tetanus shot every 10 years.  Pneumococcal, hepatitis A, and RSV shots: Ask your care team if you need these based on your risk.  Shingles shot (for age 50 and up)  General health tests  Diabetes screening:  Starting at age 35, Get screened for diabetes at least every 3 years.  If you are younger than age 35, ask your care team if you should be screened for diabetes.  Cholesterol test: At age 39, start having a cholesterol test every 5 years, or more often if advised.  Bone density scan (DEXA): At age 50, ask your care team if you should have this scan for osteoporosis (brittle bones).  Hepatitis C: Get tested at least once in your life.  STIs (sexually  transmitted infections)  Before age 24: Ask your care team if you should be screened for STIs.  After age 24: Get screened for STIs if you're at risk. You are at risk for STIs (including HIV) if:  You are sexually active with more than one person.  You don't use condoms every time.  You or a partner was diagnosed with a sexually transmitted infection.  If you are at risk for HIV, ask about PrEP medicine to prevent HIV.  Get tested for HIV at least once in your life, whether you are at risk for HIV or not.  Cancer screening tests  Cervical cancer screening: If you have a cervix, begin getting regular cervical cancer screening tests starting at age 21.  Breast cancer scan (mammogram): If you've ever had breasts, begin having regular mammograms starting at age 40. This is a scan to check for breast cancer.  Colon cancer screening: It is important to start screening for colon cancer at age 45.  Have a colonoscopy test every 10 years (or more often if you're at risk) Or, ask your provider about stool tests like a FIT test every year or Cologuard test every 3 years.  To learn more about your testing options, visit:   .  For help making a decision, visit:   https://bit.ly/vo10564.  Prostate cancer screening test: If you have a prostate, ask your care team if a prostate cancer screening test (PSA) at age 55 is right for you.  Lung cancer screening: If you are a current or former smoker ages 50 to 80, ask your care team if ongoing lung cancer screenings are right for you.  For informational purposes only. Not to replace the advice of your health care provider. Copyright   2023 [employer]. All rights reserved. Clinically reviewed by the [facility] Transitions Program. [employer] 727953 - REV 01/24.

## 2025-07-09 NOTE — PROGRESS NOTES
Preventive Care Visit  [facility]  Ildefonso Lopez MD, Family Medicine  Jul 9, 2025      Assessment & Plan     Routine general medical examination at a health care facility  - CBC with platelets and differential; Future  - Comprehensive metabolic panel (BMP + Alb, Alk Phos, ALT, AST, Total. Bili, TP); Future  - Hemoglobin A1c; Future  - Lipid Profile (Chol, Trig, HDL, LDL calc); Future  - TSH with free T4 reflex; Future  - Albumin Random Urine Quantitative with Creat Ratio; Future  - PSA, screen; Future  - Vitamin D Deficiency; Future  - Uric acid; Future    Healthcare maintenance  - BP/vitals: Reviewed, normal  - BMI: Body mass index is 40.57 kg/m .; discussed healthy diet and excise recommendations  - ASCVD risk screening: Annual A1c/lipid screen.  Discussed risk mitigation including indications for ASA/statin therapy.   The 10-year ASCVD risk score (Silvia SANCHEZ, et al., 2019) is: 7.6%    Values used to calculate the score:      Age: 55 years      Sex: Male      Is Non- : No      Diabetic: Yes      Tobacco smoker: No      Systolic Blood Pressure: 130 mmHg      Is BP treated: Yes      HDL Cholesterol: 44 mg/dL      Total Cholesterol: 139 mg/dL  - Mood: Denies concerns.      1/3/2025     8:12 AM 4/2/2024     8:39 AM   PHQ-2 ( 1999 [employer])   Q1: Little interest or pleasure in doing things 0 3   Q2: Feeling down, depressed or hopeless 0 0   PHQ-2 Score 0  3   Q1: Little interest or pleasure in doing things Not at all Nearly every day   Q2: Feeling down, depressed or hopeless Not at all Not at all   PHQ-2 Score 0 3       Patient-reported   - Tobacco/substance screening: No tobacco or illicit substance use     Tobacco Use      Smoking status: Never        Passive exposure: Never      Smokeless tobacco: Never  - Infectious disease screening: Low risk; screening up-to-date  - Cancer screening:              -Family history: No high risk family history   -Lung: n/a   -Colon: Cologuard  6/1/2022; due for 3-year follow-up   -Prostate: Annual PSA today  - Immunizations: Due for pneumococcal, hep B, COVID    Type 2 diabetes mellitus without complication, without long-term current use of insulin (H)  Has been very well-controlled over the past year A1c in the sixes.  Tolerating current regimen.  Doing well but weight loss plateaued, still titrating up since switching from Ozempic.  Increase Mounjaro to 10 mg today.  - Hemoglobin A1c; Future  - Albumin Random Urine Quantitative with Creat Ratio; Future  - ASA/statin/ACE  - tirzepatide (MOUNJARO) 10 MG/0.5ML SOAJ auto-injector pen; Inject 0.5 mLs (10 mg) subcutaneously once a week.  Dispense: 6 mL; Refill: 0  - Metformin 1000 mg twice daily  - Annual diabetic eye exam up to date    Morbid obesity due to excess calories (H)  Slow but steady progress, tolerating Mounjaro without issue, increasing dose today.  Continue lifestyle optimization.  - TSH with free T4 reflex; Future  - Vitamin D Deficiency; Future    Hyperlipidemia LDL goal <100  Very well-controlled.  Continue current statin regimen along with lites optimization.  - Lipid Profile (Chol, Trig, HDL, LDL calc); Future  - Pravastatin 40 mg    Benign essential hypertension  Controlled on current regimen.  Continue lifestyle optimization.  - CBC with platelets and differential; Future  - Comprehensive metabolic panel (BMP + Alb, Alk Phos, ALT, AST, Total. Bili, TP); Future  - TSH with free T4 reflex; Future  - Albumin Random Urine Quantitative with Creat Ratio; Future  - Amlodipine 10 mg daily  - Lisinopril 40 mg daily    Idiopathic gout, unspecified chronicity, unspecified site  Uric acid elevated today but denies any recent gout issues.  Low threshold for allopurinol based on baseline uric acid if recurrent flares.  Reviewed dietary triggers as well.  - Uric acid; Future    NAFL (nonalcoholic fatty liver)  Working on diet and weight loss.  - Comprehensive metabolic panel (BMP + Alb, Alk Phos, ALT,  "AST, Total. Bili, TP); Future    Screening for malignant neoplasm of colon  - COLOGUARD([employer]); Future    Screening for malignant neoplasm of prostate  - PSA, screen; Future    Chronic bilateral low back pain with left-sided sciatica  Stable lately, no red flag symptoms.  Just needs refill of his Flexeril.  - cyclobenzaprine (FLEXERIL) 10 MG tablet; Take 1 tablet (10 mg) by mouth 3 times daily as needed for muscle spasms.  Dispense: 90 tablet; Refill: 0    Skin lesion  New fleshy exophytic skin lesion growing mid upper back, easily irritated and catching on clothing, likely skin tag.  Discussed options and opted for shave biopsy.  Procedure tolerated without complication.  Will follow-up pathology.  - trunk, arms, legs  - lidocaine 1% with EPINEPHrine 1:100,000 injection 5 mL  - Surgical Pathology Exam    Procedure:  After informed consent was obtained, using chlorhexidine for cleansing and 1% Lidocaine with epinephrine for anesthetic, with sterile technique a shave excision of the lesion was performed  flush with the surrounding skin.  Hemostasis was obtained by manual pressure and Drysol solution.  Antibiotic dressing is applied, and wound care instructions provided.  Be alert for any signs of cutaneous infection.  The specimen is labelled and sent to pathology for evaluation.  The procedure was well tolerated without complications.    Need for pneumococcal vaccine  - PNEUMOCOCCAL 20 VALENT CONJUGATE (PREVNAR 20)      Patient has been advised of split billing requirements and indicates understanding: Yes    BMI  Estimated body mass index is 40.57 kg/m  as calculated from the following:    Height as of this encounter: 1.905 m (6' 3\").    Weight as of this encounter: 147.2 kg (324 lb 9.6 oz).   Weight management plan: Discussed healthy diet and exercise guidelines    Counseling  Appropriate preventive services were addressed with this patient via screening, questionnaire, or discussion as appropriate for " fall prevention, nutrition, physical activity, Tobacco-use cessation, social engagement, weight loss and cognition.  Checklist reviewing preventive services available has been given to the patient.  Reviewed patient's diet, addressing concerns and/or questions.   The patient was instructed to see the dentist every 6 months.   Reviewed preventive health counseling, as reflected in patient instructions    The longitudinal plan of care for the diagnosis(es)/condition(s) as documented were addressed during this visit. Due to the added complexity in care, I will continue to support Juan in the subsequent management and with ongoing continuity of care.    Follow-up 6 months for diabetes/med check.  Follow-up 1 year for annual physical.    Subjective   Juan is a 55 year old, presenting for the following:  Physical        7/9/2025     8:18 AM   Additional Questions   Roomed by Lexus Mcqueen   Accompanied by none         7/9/2025     8:18 AM   Patient Reported Additional Medications   Patient reports taking the following new medications none          HPI    Advance Care Planning    Discussed advance care planning with patient; however, patient declined at this time.        7/6/2025   General Health   How would you rate your overall physical health? Good   Feel stress (tense, anxious, or unable to sleep) Not at all         7/6/2025   Nutrition   Three or more servings of calcium each day? (!) NO   Diet: Low salt   How many servings of fruit and vegetables per day? (!) 0-1   How many sweetened beverages each day? (!) 2         7/6/2025   Exercise   Days per week of moderate/strenous exercise 5 days   Average minutes spent exercising at this level 150+ min         7/6/2025   Social Factors   Frequency of gathering with friends or relatives Patient declined   Worry food won't last until get money to buy more No   Food not last or not have enough money for food? No   Do you have housing? (Housing is defined as stable permanent  housing and does not include staying outside in a car, in a tent, in an abandoned building, in an overnight shelter, or couch-surfing.) Yes   Are you worried about losing your housing? No   Lack of transportation? No   Unable to get utilities (heat,electricity)? No         2025   Fall Risk   Fallen 2 or more times in the past year? No   Trouble with walking or balance? No          2025   Dental   Dentist two times every year? (!) NO           Today's PHQ-2 Score:       1/3/2025     8:12 AM   PHQ-2 (  [employer])   Q1: Little interest or pleasure in doing things 0   Q2: Feeling down, depressed or hopeless 0   PHQ-2 Score 0    Q1: Little interest or pleasure in doing things Not at all   Q2: Feeling down, depressed or hopeless Not at all   PHQ-2 Score 0       Patient-reported         2025   Substance Use   Alcohol more than 3/day or more than 7/wk No   Do you use any other substances recreationally? No     Social History     Tobacco Use    Smoking status: Never     Passive exposure: Never    Smokeless tobacco: Never   Vaping Use    Vaping status: Never Used   Substance Use Topics    Alcohol use: Yes     Comment: i drink a couple of drinks in a week    Drug use: Never           2025   STI Screening   New sexual partner(s) since last STI/HIV test? No   ASCVD Risk   The 10-year ASCVD risk score (Silvia SANCHEZ, et al., 2019) is: 7.6%    Values used to calculate the score:      Age: 55 years      Sex: Male      Is Non- : No      Diabetic: Yes      Tobacco smoker: No      Systolic Blood Pressure: 130 mmHg      Is BP treated: Yes      HDL Cholesterol: 44 mg/dL      Total Cholesterol: 139 mg/dL    Fracture Risk Assessment Tool  Link to Frax Calculator  Use the information below to complete the Frax calculator  : 1970  Sex: male  Weight (kg): 147.2 kg (actual weight)  Height (cm): 190.5 cm  Previous Fragility Fracture:  No  History of parent with fractured hip:  No  Current  "Smoking:  No  Patient has been on glucocorticoids for more than 3 months (5mg/day or more): No  Rheumatoid Arthritis on Problem List:  No  Secondary Osteoporosis on Problem List:  No  Consumes 3 or more units of alcohol per day: No  Femoral Neck BMD (g/cm2)           Reviewed and updated as needed this visit by Provider                    Lab work is in process  Labs reviewed in EPIC      Review of Systems  Constitutional, HEENT, cardiovascular, pulmonary, gi and gu systems are negative, except as otherwise noted.     Objective    Exam  /82 (BP Location: Left arm, Patient Position: Sitting, Cuff Size: Adult Large)   Pulse 86   Temp 98.2  F (36.8  C) (Tympanic)   Resp 20   Ht 1.905 m (6' 3\")   Wt (!) 147.2 kg (324 lb 9.6 oz)   SpO2 95%   BMI 40.57 kg/m     Estimated body mass index is 40.57 kg/m  as calculated from the following:    Height as of this encounter: 1.905 m (6' 3\").    Weight as of this encounter: 147.2 kg (324 lb 9.6 oz).    Physical Exam  GENERAL: alert and no distress  EYES: Eyes grossly normal to inspection, PERRL and conjunctivae and sclerae normal  HENT: ear canals and TM's normal, nose and mouth without ulcers or lesions  NECK: no adenopathy, no asymmetry, masses, or scars  RESP: lungs clear to auscultation - no rales, rhonchi or wheezes  CV: regular rate and rhythm, normal S1 S2, no S3 or S4, no murmur, click or rub, no peripheral edema  ABDOMEN: soft, nontender, no hepatosplenomegaly, no masses and bowel sounds normal  MS: no gross musculoskeletal defects noted, no edema  SKIN: Approximately 5 mm soft, fleshy, exophytic, amelanotic skin lesion mid upper back without surrounding inflammation  NEURO: Normal strength and tone, mentation intact and speech normal  PSYCH: mentation appears normal, affect normal/bright        Signed Electronically by: Ildefonso Lopez MD    "

## 2025-07-10 ENCOUNTER — RESULTS FOLLOW-UP (OUTPATIENT)
Dept: FAMILY MEDICINE | Facility: OTHER | Age: 55
End: 2025-07-10

## 2025-07-10 LAB
PATH REPORT.COMMENTS IMP SPEC: NORMAL
PATH REPORT.FINAL DX SPEC: NORMAL
PATH REPORT.GROSS SPEC: NORMAL
PATH REPORT.MICROSCOPIC SPEC OTHER STN: NORMAL
PATH REPORT.RELEVANT HX SPEC: NORMAL
PHOTO IMAGE: NORMAL

## 2025-07-21 DIAGNOSIS — E11.9 TYPE 2 DIABETES MELLITUS WITHOUT COMPLICATION, WITHOUT LONG-TERM CURRENT USE OF INSULIN (H): ICD-10-CM

## 2025-07-21 RX ORDER — METFORMIN HYDROCHLORIDE 500 MG/1
1000 TABLET, EXTENDED RELEASE ORAL 2 TIMES DAILY WITH MEALS
Qty: 360 TABLET | Refills: 0 | Status: SHIPPED | OUTPATIENT
Start: 2025-07-21

## 2025-08-22 ENCOUNTER — DOCUMENTATION ONLY (OUTPATIENT)
Dept: FAMILY MEDICINE | Facility: OTHER | Age: 55
End: 2025-08-22

## 2025-08-22 DIAGNOSIS — G47.33 OSA (OBSTRUCTIVE SLEEP APNEA): Primary | ICD-10-CM

## (undated) DEVICE — CANISTER SUCTION MEDI-VAC GUARDIAN 2000ML 90D 65651-220

## (undated) DEVICE — BLADE 11 RB BK SS STRL LF DISP 371211

## (undated) DEVICE — BUR ARTHREX COOLCUT DISSECTOR 4.0MMX13CM AR-8400DS

## (undated) DEVICE — TUBING ARTHROSCOPY PUMP ARTHREX AR-6410

## (undated) DEVICE — IRRIGATION-NACL 3000ML (BAG)

## (undated) DEVICE — PACK KNEE ARTHROSCOPY CUSTOM SOP32KAMBF

## (undated) DEVICE — HOLDER-KNEE ARTHROSCOPIC

## (undated) DEVICE — DRSG-SPONGE STERILE 4 X 4

## (undated) DEVICE — DRAPE ARTHROSCOPY 120X92" 9414

## (undated) DEVICE — GLV-8.0 BIOGEL PF/LF BLUE INDICATOR UNDERGLOVE

## (undated) DEVICE — BNDG ESMARK 6" STERILE LF 820-3612

## (undated) DEVICE — LABEL STERILE PREPRINTED FOR OR FRRH01-2M

## (undated) DEVICE — SUTURE-ETHILON 3-0 PS-2 1669H

## (undated) DEVICE — DRSG-XEROFORM 1X8

## (undated) DEVICE — IRRIGATION-H2O 1000ML

## (undated) DEVICE — PSTN FM PT ASCP KNEE FP-ARTKNEE

## (undated) DEVICE — BIN-ARTHROSCOPY CART

## (undated) DEVICE — BIN-ARTHROSCOPY CART BN05

## (undated) DEVICE — NDL-BLUNT FILL 18G 1.5"

## (undated) DEVICE — COVER LT HANDLE 2/PK 5160-2FG

## (undated) DEVICE — TOURNIQUET SGL BLADDER 34"X4" PURPLE 5921-034-135

## (undated) DEVICE — APPLICATOR-CHLORAPREP 26ML TINTED CHG 2%+ 70% IPA-SURGICAL

## (undated) DEVICE — SOL NACL 0.9% IRRIG 1000ML BOTTLE 2F7124

## (undated) DEVICE — PACK BASIN SET UP SUTCNBSBBA

## (undated) DEVICE — PEN MARKING SKIN W/LABELS 31145918

## (undated) DEVICE — BLADE-SCALPEL #11

## (undated) DEVICE — NDL-22G X 1 1/2" NON-SAFETY

## (undated) DEVICE — GOWN-SURG XXL LVL 3 REINFORCED

## (undated) DEVICE — IRRIGATION-NACL 1000ML

## (undated) DEVICE — LIGHT HANDLE COVER

## (undated) DEVICE — PREP CHLORAPREP 26ML TINTED HI-LITE ORANGE 930815

## (undated) DEVICE — BDG-ELASTIC 6 INCH DBL. STERILE

## (undated) DEVICE — TUBING-ARTHROSCOPY-INFLOW

## (undated) DEVICE — SYR 30ML LL W/O NDL 302832

## (undated) DEVICE — SOL NACL 0.9% IRRIG 3000ML BAG 2B7477

## (undated) DEVICE — PACK-BASIN SET-UP

## (undated) DEVICE — LABEL-STERILE PREPRINTED FOR OR

## (undated) DEVICE — PACK-KNEE ARTHROSCOPY-CUSTOM

## (undated) DEVICE — BLADE-DISSECTOR AR-8400DS

## (undated) DEVICE — GLV-8.0 ORTHO PROTEXIS PI LF/PF

## (undated) DEVICE — CAST PADDING-STERILE 6"

## (undated) DEVICE — CUFF-DISP STERILE 30IN SINGLE BLADDER

## (undated) DEVICE — GLV-7.5 BIOGEL PF/LF BLUE INDICATOR UNDERGLOVE

## (undated) RX ORDER — LIDOCAINE HYDROCHLORIDE 20 MG/ML
INJECTION, SOLUTION EPIDURAL; INFILTRATION; INTRACAUDAL; PERINEURAL
Status: DISPENSED
Start: 2021-03-30

## (undated) RX ORDER — FENTANYL CITRATE 50 UG/ML
INJECTION, SOLUTION INTRAMUSCULAR; INTRAVENOUS
Status: DISPENSED
Start: 2021-03-30

## (undated) RX ORDER — KETAMINE HCL IN NACL, ISO-OSM 100MG/10ML
SYRINGE (ML) INJECTION
Status: DISPENSED
Start: 2021-03-30

## (undated) RX ORDER — PROPOFOL 10 MG/ML
INJECTION, EMULSION INTRAVENOUS
Status: DISPENSED
Start: 2021-03-30

## (undated) RX ORDER — PROPOFOL 10 MG/ML
INJECTION, EMULSION INTRAVENOUS
Status: DISPENSED
Start: 2023-02-03

## (undated) RX ORDER — ONDANSETRON 2 MG/ML
INJECTION INTRAMUSCULAR; INTRAVENOUS
Status: DISPENSED
Start: 2021-03-30

## (undated) RX ORDER — FENTANYL CITRATE 50 UG/ML
INJECTION, SOLUTION INTRAMUSCULAR; INTRAVENOUS
Status: DISPENSED
Start: 2023-02-03

## (undated) RX ORDER — ONDANSETRON 2 MG/ML
INJECTION INTRAMUSCULAR; INTRAVENOUS
Status: DISPENSED
Start: 2023-02-03

## (undated) RX ORDER — DEXAMETHASONE SODIUM PHOSPHATE 10 MG/ML
INJECTION, SOLUTION INTRAMUSCULAR; INTRAVENOUS
Status: DISPENSED
Start: 2023-02-03

## (undated) RX ORDER — EPHEDRINE SULFATE 50 MG/ML
INJECTION, SOLUTION INTRAMUSCULAR; INTRAVENOUS; SUBCUTANEOUS
Status: DISPENSED
Start: 2023-02-03